# Patient Record
Sex: FEMALE | Race: WHITE | NOT HISPANIC OR LATINO | Employment: OTHER | URBAN - METROPOLITAN AREA
[De-identification: names, ages, dates, MRNs, and addresses within clinical notes are randomized per-mention and may not be internally consistent; named-entity substitution may affect disease eponyms.]

---

## 2017-03-07 ENCOUNTER — TELEPHONE (OUTPATIENT)
Dept: PAIN MEDICINE | Facility: CLINIC | Age: 47
End: 2017-03-07

## 2017-03-07 NOTE — TELEPHONE ENCOUNTER
----- Message from Uli Pratt sent at 3/7/2017  3:15 PM CST -----  X_  1st Request  _  2nd Request  _  3rd Request        Who: RUBEN MERA [[id number]]    Why: Patient needs to schedule botox injections for migraines    What Number to Call Back: [phone]

## 2017-03-07 NOTE — TELEPHONE ENCOUNTER
Contacted and scheduled a est office for patient as she hasn't been to clinic since 2014, she requires a office visit prior to us scheduling botox.

## 2017-03-14 ENCOUNTER — OFFICE VISIT (OUTPATIENT)
Dept: PAIN MEDICINE | Facility: CLINIC | Age: 47
End: 2017-03-14
Payer: COMMERCIAL

## 2017-03-14 VITALS
HEART RATE: 96 BPM | WEIGHT: 152.13 LBS | RESPIRATION RATE: 20 BRPM | TEMPERATURE: 98 F | HEIGHT: 66 IN | DIASTOLIC BLOOD PRESSURE: 87 MMHG | SYSTOLIC BLOOD PRESSURE: 144 MMHG | BODY MASS INDEX: 24.45 KG/M2

## 2017-03-14 DIAGNOSIS — M54.2 CERVICALGIA: ICD-10-CM

## 2017-03-14 DIAGNOSIS — G24.3 CERVICAL DYSTONIA: Primary | ICD-10-CM

## 2017-03-14 PROCEDURE — 1160F RVW MEDS BY RX/DR IN RCRD: CPT | Mod: S$GLB,,, | Performed by: NURSE PRACTITIONER

## 2017-03-14 PROCEDURE — 99999 PR PBB SHADOW E&M-EST. PATIENT-LVL III: CPT | Mod: PBBFAC,,, | Performed by: NURSE PRACTITIONER

## 2017-03-14 PROCEDURE — 99213 OFFICE O/P EST LOW 20 MIN: CPT | Mod: S$GLB,,, | Performed by: NURSE PRACTITIONER

## 2017-03-14 RX ORDER — PREDNISONE 5 MG/1
TABLET ORAL
COMMUNITY
Start: 2017-03-03 | End: 2017-05-05 | Stop reason: SDUPTHER

## 2017-03-14 NOTE — PROGRESS NOTES
Chronic patient Established Note (Follow up visit)      SUBJECTIVE:    Esme Louise presents to the clinic for a follow-up appointment for neck pain. She was last seen in our office in 2014. Since that time, she has had a bilateral occipital nerve decompression surgery done in [place]. This has significantly improved her head pain. She is no longer on disability. She continues to have neck pain which causes migraines. She reports 15 days out of the last month with a headache, with some episodes lasting 4 days. Her neurologist, Dr. Orozco, has recommended Botox injections. She has had them in the past with benefit, last in 2014. She describes her neck pain as pulling and tightness. The pain causes her head to feel heavy. She does use muscle relaxers and ice with some relief. She has done physical therapy for her neck in the past with some benefit. Since the last visit, Esme Louise states the pain has been improving. Current pain intensity is 1/10.    Pain Disability Index Review:  Last 3 PDI Scores 3/14/2017 3/17/2014   Pain Disability Index (PDI) 23 50       Pain Medications:    - Adjuvant Medications: Lyrica ( Pregabalin), Xanax, Zomig, Imitrex    Opioid Contract: no     report:  Reviewed and consistent with medication use as prescribed.    Pain Procedures:  2/24/2014- Bilateral C2,3 MBB  3/20/2014- Right C2,3 RFA  3/27/2014- Left C2,3 RFA    Physical Therapy/Home Exercise: yes    Imaging:   None available    Allergies:   Review of patient's allergies indicates:   Allergen Reactions    Pcn [penicillins] Swelling    Zoloft [sertraline] Hives and Swelling       Current Medications:   Current Outpatient Prescriptions   Medication Sig Dispense Refill    abatacept, with maltose, (ORENCIA) 250 mg injection Inject 500 mg into the skin.      butalbital-acetaminophen-caff -40 mg Cap TK ONE C PO Q 4 HOURS  0    clonazePAM (KLONOPIN) 0.5 MG tablet TK 1 T PO BID PRN  5    cyclobenzaprine (AMRIX) 15 MG 24 hr  capsule TAKE 1 CAPSULE BY MOUTH DAILY AT 6 IN THE EVENING compounded especially for you by the pharmacist  3    diclofenac (VOLTAREN) 50 MG EC tablet TK 1 T PO BID  0    fluoxetine (PROZAC) 20 MG tablet Take 20 mg by mouth once daily.      levothyroxine (SYNTHROID) 100 MCG tablet Take 100 mcg by mouth once daily.      LYRICA 100 mg capsule TK ONE C PO QHS  2    NUVIGIL 250 mg tablet TK 1 T PO QD  5    sumatriptan (IMITREX STATDOSE) 6 mg/0.5 mL kit INJECT 6MG UNDER SKIN ONCE. MAY REPEAT IN 1 HOUR IF NEEDED  2    tizanidine 2 mg Cap Take 2 mg by mouth as needed.      zolmitriptan (ZOMIG) 5 MG tablet TK 1 T PO  ONCE PRN HA  3    alprazolam (XANAX) 0.5 MG tablet Take 1 tablet (0.5 mg total) by mouth as needed for Insomnia or Anxiety. 5 tablet 0    esomeprazole (NEXIUM) 40 MG capsule TK 1 C PO QD  1    methotrexate 5 MG tablet Take 30 mg by mouth once a week.      misoprostol (CYTOTEC) 200 MCG Tab 2 tabs po x1 prior to procedure 2 tablet 0    predniSONE (DELTASONE) 5 MG tablet        No current facility-administered medications for this visit.        REVIEW OF SYSTEMS:    GENERAL:  No weight loss, malaise or fevers.  HEENT:  Frequent headaches.   NECK:  Negative for lumps, goiter, pain and significant neck swelling.  RESPIRATORY:  Negative for cough, wheezing or shortness of breath.  CARDIOVASCULAR:  Negative for chest pain, leg swelling or palpitations. HTN.   GI:  Negative for abdominal discomfort, blood in stools or black stools or change in bowel habits.  MUSCULOSKELETAL:  See HPI.  SKIN:  Negative for lesions, rash, and itching.  PSYCH:  Negative for sleep disturbance, mood disorder and recent psychosocial stressors.  HEMATOLOGY/LYMPHOLOGY:  Negative for prolonged bleeding, bruising easily or swollen nodes.  NEURO:   No history of headaches, syncope, paralysis, seizures or tremors.  ENDO: Thyroid disorder- taking Synthroid.   All other reviewed and negative other than HPI.    Past Medical History:  Past  "Medical History:   Diagnosis Date    Anemia     Anxiety     Arthritis     Arthritis     Breast disorder     Depression     Endometriosis     Headaches, cluster     Hypertension     Mental disorder     Occipital neuralgia     Premature ovarian failure     Thyroid disease        Past Surgical History:  Past Surgical History:   Procedure Laterality Date    BREAST LUMPECTOMY      BREAST LUMPECTOMY      ENDOMETRIAL ABLATION      OCCIPITAL NERVE STIMULATOR INSERTION      OOPHORECTOMY      ovary removed         Family History:  Family History   Problem Relation Age of Onset    Autoimmune disease Maternal Uncle     Diabetes Maternal Grandmother     Miscarriages / Stillbirths Maternal Grandmother     Colon cancer Maternal Grandfather     Breast cancer Neg Hx     Eclampsia Neg Hx     Hypertension Neg Hx     Ovarian cancer Neg Hx        Social History:  Social History     Social History    Marital status:      Spouse name: N/A    Number of children: N/A    Years of education: N/A     Social History Main Topics    Smoking status: Former Smoker    Smokeless tobacco: None    Alcohol use No    Drug use: No    Sexual activity: No     Other Topics Concern    None     Social History Narrative       OBJECTIVE:    BP (!) 144/87  Pulse 96  Temp 98.2 °F (36.8 °C) (Oral)   Resp 20  Ht 5' 6" (1.676 m)  Wt 69 kg (152 lb 1.6 oz)  BMI 24.55 kg/m2    PHYSICAL EXAMINATION:    General appearance: Well appearing, in no acute distress, alert and oriented x3.  Psych:  Mood and affect appropriate.  Skin: Skin color, texture, turgor normal, no rashes or lesions, in both upper and lower body.  Head/face:  Atraumatic, normocephalic. No palpable lymph nodes  Neck: No pain to palpation over the cervical paraspinous muscles. Pain with palpation to trapezius muscle.  Spurling Negative. Full ROM with pain on flexion and extension.   Cor: RRR  Pulm: CTA  GI: Abdomen soft and non-tender.  Back: Straight leg " raising in the sitting and supine positions is negative to radicular pain. No pain to palpation over the spine or costovertebral angles. Normal range of motion without pain reproduction.  Extremities: Peripheral joint ROM is full and pain free without obvious instability or laxity in all four extremities. No deformities, edema, or skin discoloration. Good capillary refill.  Musculoskeletal: Shoulder, hip, sacroiliac and knee provocative maneuvers are negative. Bilateral upper and lower extremity strength is normal and symmetric.  No atrophy or tone abnormalities are noted.  Neuro: Bilateral upper and lower extremity coordination and muscle stretch reflexes are physiologic and symmetric.  Plantar response are downgoing. No loss of sensation is noted.  Gait: Normal.    ASSESSMENT: 46 y.o. year old female with neck pain, consistent with the followin. Cervical dystonia  Medication Pre-Authorization   2. Cervicalgia  Medication Pre-Authorization         PLAN:     - Obtain records from Dr. Orozco, neurology, in [place].     - Medication PA initiated for Botox 300 units.     - Continue current medications.     - RTC in 2 weeks.     - Counseled patient regarding the importance of activity modification, constant sleeping habits and physical therapy.    - Dr. Akbar was consulted on the patient and agrees with this plan.    The above plan and management options were discussed at length with patient. Patient is in agreement with the above and verbalized understanding.    Shikha Vazquez NP  2017

## 2017-03-14 NOTE — MR AVS SNAPSHOT
[facility]  [address]  [place] [address]  Phone: [phone]  Fax: [phone]                  Esme Louise   3/14/2017 9:30 AM   Office Visit    Description:  Female : 1970   Provider:  Shikha Vazquez NP   Department:  [facility]           Reason for Visit     Neck Pain           Diagnoses this Visit        Comments    Cervical dystonia    -  Primary     Cervicalgia                To Do List           Future Appointments        Provider Department Dept Phone    3/29/2017 3:15 PM Mai Akbar MD [facility] [phone]      Goals (5 Years of Data)     None      [facility] On [facility]     [facility] On Call Nurse Care Line -  Assistance  Registered nurses in the [facility] On Call Center provide clinical advisement, health education, appointment booking, and other advisory services.  Call for this free service at [phone].             Medications           Message regarding Medications     Verify the changes and/or additions to your medication regime listed below are the same as discussed with your clinician today.  If any of these changes or additions are incorrect, please notify your healthcare provider.             Verify that the below list of medications is an accurate representation of the medications you are currently taking.  If none reported, the list may be blank. If incorrect, please contact your healthcare provider. Carry this list with you in case of emergency.           Current Medications     abatacept, with maltose, (ORENCIA) 250 mg injection Inject 500 mg into the skin.    butalbital-acetaminophen-caff -40 mg Cap TK ONE C PO Q 4 HOURS    clonazePAM (KLONOPIN) 0.5 MG tablet TK 1 T PO BID PRN    cyclobenzaprine (AMRIX) 15 MG 24 hr capsule TAKE 1 CAPSULE BY MOUTH DAILY AT 6 IN THE EVENING compounded especially for you by the pharmacist    diclofenac (VOLTAREN) 50 MG EC tablet TK 1 T PO BID    fluoxetine (PROZAC) 20 MG tablet  "Take 20 mg by mouth once daily.    levothyroxine (SYNTHROID) 100 MCG tablet Take 100 mcg by mouth once daily.    LYRICA 100 mg capsule TK ONE C PO QHS    NUVIGIL 250 mg tablet TK 1 T PO QD    sumatriptan (IMITREX STATDOSE) 6 mg/0.5 mL kit INJECT 6MG UNDER SKIN ONCE. MAY REPEAT IN 1 HOUR IF NEEDED    tizanidine 2 mg Cap Take 2 mg by mouth as needed.    zolmitriptan (ZOMIG) 5 MG tablet TK 1 T PO  ONCE PRN HA    alprazolam (XANAX) 0.5 MG tablet Take 1 tablet (0.5 mg total) by mouth as needed for Insomnia or Anxiety.    esomeprazole (NEXIUM) 40 MG capsule TK 1 C PO QD    methotrexate 5 MG tablet Take 30 mg by mouth once a week.    misoprostol (CYTOTEC) 200 MCG Tab 2 tabs po x1 prior to procedure    predniSONE (DELTASONE) 5 MG tablet            Clinical Reference Information           Your Vitals Were     BP Pulse Temp Resp Height Weight    144/87 96 98.2 °F (36.8 °C) (Oral) 20 5' 6" (1.676 m) 69 kg (152 lb 1.6 oz)    BMI                24.55 kg/m2          Blood Pressure          Most Recent Value    BP  (!)  144/87      Allergies as of 3/14/2017     Pcn [Penicillins]    Zoloft [Sertraline]      Immunizations Administered on Date of Encounter - 3/14/2017     None      Language Assistance Services     ATTENTION: Language assistance services are available, free of charge. Please call [phone].      ATENCIÓN: Si habla blanca, tiene a elizabeth disposición servicios gratuitos de asistencia lingüística. Llame al [phone].     [facility] Ý: N?u b?n nói Ti?ng Vi?t, có các d?ch v? h? tr? ngôn ng? mi?n phí dành cho b?n. G?i s? [phone].         [facility] complies with applicable Federal civil rights laws and does not discriminate on the basis of race, color, national origin, age, disability, or sex.        "

## 2017-03-24 ENCOUNTER — PATIENT MESSAGE (OUTPATIENT)
Dept: PAIN MEDICINE | Facility: CLINIC | Age: 47
End: 2017-03-24

## 2017-03-29 ENCOUNTER — TELEPHONE (OUTPATIENT)
Dept: PAIN MEDICINE | Facility: CLINIC | Age: 47
End: 2017-03-29

## 2017-03-29 NOTE — TELEPHONE ENCOUNTER
----- Message from Uli Pratt sent at 3/29/2017 11:44 AM CDT -----  Contact: RUBEN MERA [[id number]]  _X  1st Request  _  2nd Request  _  3rd Request        Who: RUBEN MERA [[id number]]    Why: Patient calling to check and see if Botox injections are approved by insurance. Patient has an appt today at 3:15.    What Number to Call Back: [phone]    When to Expect a call back: (Before the end of the day)   -- if the call is after 12:00, the call back will be tomorrow.

## 2017-03-29 NOTE — TELEPHONE ENCOUNTER
Patient was contacted by telephone regarding her [facility] message.     Reference number regarding botox approval is [id number]  Nurse kim -[phone] fax [phone].

## 2017-03-29 NOTE — TELEPHONE ENCOUNTER
Contacted and spoke to patient, informed her that her botox is pending nurse review and at this time has not been approved. They didn't give staff a time frame, but staff have informed patient they will contact her by 2pm to confirm approval or r/s due to no authorization as of yet.    Patient verbalized understanding and also was given staff contact information.

## 2017-04-04 ENCOUNTER — PATIENT MESSAGE (OUTPATIENT)
Dept: PAIN MEDICINE | Facility: CLINIC | Age: 47
End: 2017-04-04

## 2017-04-05 ENCOUNTER — OFFICE VISIT (OUTPATIENT)
Dept: PAIN MEDICINE | Facility: CLINIC | Age: 47
End: 2017-04-05
Attending: ANESTHESIOLOGY
Payer: COMMERCIAL

## 2017-04-05 VITALS
WEIGHT: 149.94 LBS | SYSTOLIC BLOOD PRESSURE: 141 MMHG | HEART RATE: 112 BPM | DIASTOLIC BLOOD PRESSURE: 90 MMHG | TEMPERATURE: 98 F | HEIGHT: 66 IN | BODY MASS INDEX: 24.1 KG/M2

## 2017-04-05 DIAGNOSIS — G24.3 ISOLATED CERVICAL DYSTONIA: ICD-10-CM

## 2017-04-05 DIAGNOSIS — M62.838 MUSCLE SPASM: ICD-10-CM

## 2017-04-05 DIAGNOSIS — G43.119 INTRACTABLE MIGRAINE WITH AURA WITHOUT STATUS MIGRAINOSUS: Primary | ICD-10-CM

## 2017-04-05 PROCEDURE — 64612 DESTROY NERVE FACE MUSCLE: CPT | Mod: S$GLB,,, | Performed by: ANESTHESIOLOGY

## 2017-04-05 PROCEDURE — 99499 UNLISTED E&M SERVICE: CPT | Mod: S$GLB,,, | Performed by: ANESTHESIOLOGY

## 2017-04-05 PROCEDURE — 95874 GUIDE NERV DESTR NEEDLE EMG: CPT | Mod: S$GLB,,, | Performed by: ANESTHESIOLOGY

## 2017-04-05 PROCEDURE — 99999 PR PBB SHADOW E&M-EST. PATIENT-LVL III: CPT | Mod: PBBFAC,,, | Performed by: ANESTHESIOLOGY

## 2017-04-05 PROCEDURE — 64616 CHEMODENERV MUSC NECK DYSTON: CPT | Mod: 50,59,S$GLB, | Performed by: ANESTHESIOLOGY

## 2017-04-05 NOTE — LETTER
April 5, 2017      Shikha Vazquez, NP  [address]  [address]  [facility] [address]           [facility]  [address] [facility] [facility] [address]  Phone: [phone]  Fax: [phone]          Patient: Esme Louise   MR Number: [id number]   YOB: 1970   Date of Visit: 4/5/2017       Dear Shikha Vazquez:    Thank you for referring Esme Louise to me for evaluation. Attached you will find relevant portions of my assessment and plan of care.    If you have questions, please do not hesitate to call me. I look forward to following Esme Louise along with you.    Sincerely,    Mai Akbar MD    Enclosure  CC:  No Recipients    If you would like to receive this communication electronically, please contact [email or web address] or [phone] to request more information on [employer] access.    For providers and/or their staff who would like to refer a patient to [facility], please contact us through our one-stop-shop provider referral line, [facility], at [phone].    If you feel you have received this communication in error or would no longer like to receive these types of communications, please e-mail [email or web address]

## 2017-04-05 NOTE — MR AVS SNAPSHOT
[facility]  [address]  [place] [address]  Phone: [phone]  Fax: [phone]                  Esme Louise   2017 3:15 PM   Office Visit    Description:  Female : 1970   Provider:  Mai Akbar MD   Department:  [facility]           Reason for Visit     Botulinum Toxin Injection                To Do List           Future Appointments        Provider Department Dept Phone    2017 9:00 AM FANNY Cevallos [facility] [phone]      Goals (5 Years of Data)     None      [facility]     [facility] On Call Nurse Care Line -  Assistance  Unless otherwise directed by your provider, please contact [facility], our nurse care line that is available for  assistance.     Registered nurses in the [facility] On Call Center provide: appointment scheduling, clinical advisement, health education, and other advisory services.  Call: [phone] (toll free)               Medications           Message regarding Medications     Verify the changes and/or additions to your medication regime listed below are the same as discussed with your clinician today.  If any of these changes or additions are incorrect, please notify your healthcare provider.             Verify that the below list of medications is an accurate representation of the medications you are currently taking.  If none reported, the list may be blank. If incorrect, please contact your healthcare provider. Carry this list with you in case of emergency.           Current Medications     abatacept, with maltose, (ORENCIA) 250 mg injection Inject 500 mg into the skin.    alprazolam (XANAX) 0.5 MG tablet Take 1 tablet (0.5 mg total) by mouth as needed for Insomnia or Anxiety.    butalbital-acetaminophen-caff -40 mg Cap TK ONE C PO Q 4 HOURS    clonazePAM (KLONOPIN) 0.5 MG tablet TK 1 T PO BID PRN    cyclobenzaprine (AMRIX) 15 MG 24 hr capsule TAKE 1 CAPSULE BY MOUTH  "DAILY AT 6 IN THE EVENING compounded especially for you by the pharmacist    diclofenac (VOLTAREN) 50 MG EC tablet TK 1 T PO BID    esomeprazole (NEXIUM) 40 MG capsule TK 1 C PO QD    fluoxetine (PROZAC) 20 MG tablet Take 20 mg by mouth once daily.    levothyroxine (SYNTHROID) 100 MCG tablet Take 100 mcg by mouth once daily.    LYRICA 100 mg capsule TK ONE C PO QHS    methotrexate 5 MG tablet Take 30 mg by mouth once a week.    misoprostol (CYTOTEC) 200 MCG Tab 2 tabs po x1 prior to procedure    NUVIGIL 250 mg tablet TK 1 T PO QD    predniSONE (DELTASONE) 5 MG tablet     sumatriptan (IMITREX STATDOSE) 6 mg/0.5 mL kit INJECT 6MG UNDER SKIN ONCE. MAY REPEAT IN 1 HOUR IF NEEDED    tizanidine 2 mg Cap Take 2 mg by mouth as needed.    zolmitriptan (ZOMIG) 5 MG tablet TK 1 T PO  ONCE PRN HA           Clinical Reference Information           Your Vitals Were     BP Pulse Temp Height Weight BMI    141/90 112 98.2 °F (36.8 °C) (Oral) 5' 6" (1.676 m) 68 kg (149 lb 14.6 oz) 24.2 kg/m2      Blood Pressure          Most Recent Value    BP  (!)  141/90      Allergies as of 4/5/2017     Pcn [Penicillins]    Zoloft [Sertraline]      Immunizations Administered on Date of Encounter - 4/5/2017     None      Language Assistance Services     ATTENTION: Language assistance services are available, free of charge. Please call [phone].      ATENCIÓN: Si habla blanca, tiene a elizabeth disposición servicios gratuitos de asistencia lingüística. Llame al [phone].     CHÚ Ý: N?u b?n nói Ti?ng Vi?t, có các d?ch v? h? tr? ngôn ng? mi?n phí dành cho b?n. G?i s? [phone].         [facility] complies with applicable Federal civil rights laws and does not discriminate on the basis of race, color, national origin, age, disability, or sex.        "

## 2017-04-05 NOTE — PROGRESS NOTES
Subjective:      Patient ID: Esme Louise is a 46 y.o. female.    Chief Complaint: Botulinum Toxin Injection (100 units)    Referred by: Shikha Vazquez NP     Pain Scales  Best:/10  Worst:  Usually:  Today: 6/10      Here for follow-up and for Botox injection.  She has bilateral neck pain but she also has intractable headaches that radiates from her neck.  She says that she had surgical release of the nerves in her head with significant pain one year postoperatively then it settled down her pain recurred afterwards.  She has sharp pain mostly to the right side.  Is not throbbing.  She has nausea with it and has high sensitivity to odors.  No other associated symptoms.  No new symptomatology.    Past Medical History:   Diagnosis Date    Anemia     Anxiety     Arthritis     Arthritis     Breast disorder     Depression     Endometriosis     Headaches, cluster     Hypertension     Mental disorder     Occipital neuralgia     Premature ovarian failure     Thyroid disease        Past Surgical History:   Procedure Laterality Date    BREAST LUMPECTOMY      BREAST LUMPECTOMY      ENDOMETRIAL ABLATION      OCCIPITAL NERVE STIMULATOR INSERTION      OOPHORECTOMY      ovary removed         Review of patient's allergies indicates:   Allergen Reactions    Pcn [penicillins] Swelling    Zoloft [sertraline] Hives and Swelling       Current Outpatient Prescriptions   Medication Sig Dispense Refill    abatacept, with maltose, (ORENCIA) 250 mg injection Inject 500 mg into the skin.      alprazolam (XANAX) 0.5 MG tablet Take 1 tablet (0.5 mg total) by mouth as needed for Insomnia or Anxiety. 5 tablet 0    butalbital-acetaminophen-caff -40 mg Cap TK ONE C PO Q 4 HOURS  0    clonazePAM (KLONOPIN) 0.5 MG tablet TK 1 T PO BID PRN  5    cyclobenzaprine (AMRIX) 15 MG 24 hr capsule TAKE 1 CAPSULE BY MOUTH DAILY AT 6 IN THE EVENING compounded especially for you by the pharmacist  3    diclofenac (VOLTAREN) 50 MG  EC tablet TK 1 T PO BID  0    esomeprazole (NEXIUM) 40 MG capsule TK 1 C PO QD  1    fluoxetine (PROZAC) 20 MG tablet Take 20 mg by mouth once daily.      levothyroxine (SYNTHROID) 100 MCG tablet Take 100 mcg by mouth once daily.      LYRICA 100 mg capsule TK ONE C PO QHS  2    methotrexate 5 MG tablet Take 30 mg by mouth once a week.      misoprostol (CYTOTEC) 200 MCG Tab 2 tabs po x1 prior to procedure 2 tablet 0    NUVIGIL 250 mg tablet TK 1 T PO QD  5    predniSONE (DELTASONE) 5 MG tablet       sumatriptan (IMITREX STATDOSE) 6 mg/0.5 mL kit INJECT 6MG UNDER SKIN ONCE. MAY REPEAT IN 1 HOUR IF NEEDED  2    tizanidine 2 mg Cap Take 2 mg by mouth as needed.      zolmitriptan (ZOMIG) 5 MG tablet TK 1 T PO  ONCE PRN HA  3     No current facility-administered medications for this visit.        Family History   Problem Relation Age of Onset    Autoimmune disease Maternal Uncle     Diabetes Maternal Grandmother     Miscarriages / Stillbirths Maternal Grandmother     Colon cancer Maternal Grandfather     Breast cancer Neg Hx     Eclampsia Neg Hx     Hypertension Neg Hx     Ovarian cancer Neg Hx        Social History     Social History    Marital status:      Spouse name: N/A    Number of children: N/A    Years of education: N/A     Occupational History    Not on file.     Social History Main Topics    Smoking status: Former Smoker    Smokeless tobacco: Not on file    Alcohol use No    Drug use: No    Sexual activity: No     Other Topics Concern    Not on file     Social History Narrative       Review of Systems   Constitution: Negative for chills, fever, malaise/fatigue, weight gain and weight loss.   HENT: Positive for headaches. Negative for ear pain and hoarse voice.    Eyes: Negative for blurred vision, pain and visual disturbance.   Cardiovascular: Negative for chest pain, dyspnea on exertion and irregular heartbeat.   Respiratory: Negative for cough, shortness of breath and  "wheezing.    Endocrine: Negative for cold intolerance and heat intolerance.   Hematologic/Lymphatic: Negative for adenopathy and bleeding problem. Does not bruise/bleed easily.   Skin: Negative for color change, itching and rash.   Musculoskeletal: Positive for neck pain. Negative for back pain.   Gastrointestinal: Negative for change in bowel habit, diarrhea, hematemesis, hematochezia, melena and vomiting.   Genitourinary: Negative for flank pain, frequency, hematuria and urgency.   Neurological: Negative for difficulty with concentration, dizziness, loss of balance and seizures.   Psychiatric/Behavioral: Negative for altered mental status, depression and suicidal ideas. The patient is not nervous/anxious.    Allergic/Immunologic: Negative for HIV exposure.           Objective:      BP (!) 141/90  Pulse (!) 112  Temp 98.2 °F (36.8 °C) (Oral)   Ht 5' 6" (1.676 m)  Wt 68 kg (149 lb 14.6 oz)  BMI 24.2 kg/m2  Normocephalic.  Atraumatic.  Affect appropriate.  Breathing unlabored.  Extra ocular muscles intact.      Ortho/SPM Exam    bilateral muscle spasm in the neck with prominent muscles.  Her shoulders are drawn upwards.  Significant tenderness.  No signs of infection.  Assessment:       Encounter Diagnoses   Name Primary?    Intractable migraine with aura without status migrainosus Yes    Isolated cervical dystonia     Muscle spasm          Plan:       Esme was seen today for botulinum toxin injection.    Diagnoses and all orders for this visit:    Intractable migraine with aura without status migrainosus  -     onabotulinumtoxina injection 200 Units; Inject 200 Units into the muscle one time.    Isolated cervical dystonia  -     onabotulinumtoxina injection 200 Units; Inject 200 Units into the muscle one time.    Muscle spasm  -     onabotulinumtoxina injection 200 Units; Inject 200 Units into the muscle one time.     We discussed with the patient the assessment and recommendations. The following is the " plan we agreed on:  1.  Procedure: Under clean technique, and after discussion with the patient we used 200 units of Botox.  We injected the procerus in the midline.  We injected bilateral , frontalis, temporalis, splenius capitis, upper cervical paraspinals, upper trapezius and levator scapulae.  The patient tolerated procedure well.  2.  Blood pressure as per primary care physician.  3.  Return as needed.  Otherwise follow-up in one month to see how it worked.

## 2017-05-05 ENCOUNTER — OFFICE VISIT (OUTPATIENT)
Dept: PAIN MEDICINE | Facility: CLINIC | Age: 47
End: 2017-05-05
Payer: COMMERCIAL

## 2017-05-05 VITALS
SYSTOLIC BLOOD PRESSURE: 143 MMHG | WEIGHT: 155 LBS | DIASTOLIC BLOOD PRESSURE: 97 MMHG | BODY MASS INDEX: 24.91 KG/M2 | TEMPERATURE: 99 F | HEIGHT: 66 IN | HEART RATE: 91 BPM

## 2017-05-05 DIAGNOSIS — M25.511 BILATERAL SHOULDER PAIN, UNSPECIFIED CHRONICITY: ICD-10-CM

## 2017-05-05 DIAGNOSIS — G43.119 INTRACTABLE MIGRAINE WITH AURA WITHOUT STATUS MIGRAINOSUS: ICD-10-CM

## 2017-05-05 DIAGNOSIS — M25.512 BILATERAL SHOULDER PAIN, UNSPECIFIED CHRONICITY: ICD-10-CM

## 2017-05-05 DIAGNOSIS — G24.3 CERVICAL DYSTONIA: ICD-10-CM

## 2017-05-05 DIAGNOSIS — M62.838 MUSCLE SPASM: ICD-10-CM

## 2017-05-05 DIAGNOSIS — M54.2 CERVICALGIA: Primary | ICD-10-CM

## 2017-05-05 DIAGNOSIS — G24.3 ISOLATED CERVICAL DYSTONIA: ICD-10-CM

## 2017-05-05 PROCEDURE — 1160F RVW MEDS BY RX/DR IN RCRD: CPT | Mod: S$GLB,,, | Performed by: NURSE PRACTITIONER

## 2017-05-05 PROCEDURE — 99999 PR PBB SHADOW E&M-EST. PATIENT-LVL III: CPT | Mod: PBBFAC,,, | Performed by: NURSE PRACTITIONER

## 2017-05-05 PROCEDURE — 99213 OFFICE O/P EST LOW 20 MIN: CPT | Mod: S$GLB,,, | Performed by: NURSE PRACTITIONER

## 2017-05-05 NOTE — MR AVS SNAPSHOT
[facility]  [address]  [place] [address]  Phone: [phone]  Fax: [phone]                  Esme Louise   2017 9:00 AM   Office Visit    Description:  Female : 1970   Provider:  FANNY Cevallos   Department:  [facility]           Diagnoses this Visit        Comments    Cervicalgia    -  Primary     Muscle spasm         Cervical dystonia         Intractable migraine with aura without status migrainosus         Isolated cervical dystonia         Bilateral shoulder pain, unspecified chronicity                To Do List           Future Appointments        Provider Department Dept Phone    2017 9:15 AM Mai Akbar MD [facility] [phone]      Goals (5 Years of Data)     None      [facility]     [facility] -  Assistance  Unless otherwise directed by your provider, please contact [facility], our nurse care line that is available for  assistance.     Registered nurses in the [facility] Center provide: appointment scheduling, clinical advisement, health education, and other advisory services.  Call: [phone] (toll free)               Medications           Message regarding Medications     Verify the changes and/or additions to your medication regime listed below are the same as discussed with your clinician today.  If any of these changes or additions are incorrect, please notify your healthcare provider.        STOP taking these medications     predniSONE (DELTASONE) 5 MG tablet            Verify that the below list of medications is an accurate representation of the medications you are currently taking.  If none reported, the list may be blank. If incorrect, please contact your healthcare provider. Carry this list with you in case of emergency.           Current Medications     abatacept, with maltose, (ORENCIA) 250 mg injection Inject 500 mg into the skin.    alprazolam  "(XANAX) 0.5 MG tablet Take 1 tablet (0.5 mg total) by mouth as needed for Insomnia or Anxiety.    butalbital-acetaminophen-caff -40 mg Cap TK ONE C PO Q 4 HOURS    clonazePAM (KLONOPIN) 0.5 MG tablet TK 1 T PO BID PRN    cyclobenzaprine (AMRIX) 15 MG 24 hr capsule TAKE 1 CAPSULE BY MOUTH DAILY AT 6 IN THE EVENING compounded especially for you by the pharmacist    diclofenac (VOLTAREN) 50 MG EC tablet TK 1 T PO BID    esomeprazole (NEXIUM) 40 MG capsule TK 1 C PO QD    fluoxetine (PROZAC) 20 MG tablet Take 20 mg by mouth once daily.    levothyroxine (SYNTHROID) 100 MCG tablet Take 100 mcg by mouth once daily.    LYRICA 100 mg capsule TK ONE C PO QHS    methotrexate 5 MG tablet Take 30 mg by mouth once a week.    misoprostol (CYTOTEC) 200 MCG Tab 2 tabs po x1 prior to procedure    NUVIGIL 250 mg tablet TK 1 T PO QD    sumatriptan (IMITREX STATDOSE) 6 mg/0.5 mL kit INJECT 6MG UNDER SKIN ONCE. MAY REPEAT IN 1 HOUR IF NEEDED    tizanidine 2 mg Cap Take 2 mg by mouth as needed.    zolmitriptan (ZOMIG) 5 MG tablet TK 1 T PO  ONCE PRN HA           Clinical Reference Information           Your Vitals Were     BP Pulse Temp Height Weight BMI    143/97 91 98.7 °F (37.1 °C) 5' 6" (1.676 m) 70.3 kg (154 lb 15.7 oz) 25.01 kg/m2      Blood Pressure          Most Recent Value    BP  (!)  143/97      Allergies as of 5/5/2017     Pcn [Penicillins]    Zoloft [Sertraline]      Immunizations Administered on Date of Encounter - 5/5/2017     None      Orders Placed During Today's Visit      Normal Orders This Visit    Ambulatory consult to [facility]       Language Assistance Services     ATTENTION: Language assistance services are available, free of charge. Please call [phone].      ATENCIÓN: Si habla blanca, tiene a elizabeth disposición servicios gratuitos de asistencia lingüística. Llame al [phone].     CHÚ Ý: N?u b?n nói Ti?ng Vi?t, có các d?ch v? h? tr? ngôn ng? mi?n phí lizetteh cho b?n. G?i s? [phone].   "       [facility] complies with applicable Federal civil rights laws and does not discriminate on the basis of race, color, national origin, age, disability, or sex.

## 2017-05-05 NOTE — PROGRESS NOTES
Chronic patient Established Note (Follow up visit)      SUBJECTIVE:    Esme Louise presents to the clinic for a follow-up appointment for neck pain.  She is s/p 200 units of Botox with about 50% relief of head and neck pain.  She now notices pain a little lower down, mainly between her shoulder blades.  Her pain is worse with activity and turning her head.  If she is active for a large duration of time, she feels increased pain and muscle soreness.  She reports that she cleaned her house yesterday for a few hours which caused increased muscle pain.  She has done physical therapy for her neck in the past with some benefit.  Since the last visit, Esme Louise states the pain has been improving. Current pain intensity is 2/10.    Pain Disability Index Review:  Last 3 PDI Scores 5/5/2017 4/5/2017 3/14/2017   Pain Disability Index (PDI) 7 [phone] 23       Pain Medications:    - Adjuvant Medications: Lyrica ( Pregabalin), Xanax, Zomig, Imitrex    Opioid Contract: no     report:  Reviewed and consistent with medication use as prescribed.    Pain Procedures:  2/24/2014- Bilateral C2,3 MBB  3/20/2014- Right C2,3 RFA  3/27/2014- Left C2,3 RFA  4/5/17 200 Units Botox    Physical Therapy/Home Exercise: yes    Imaging:   None available    Allergies:   Review of patient's allergies indicates:   Allergen Reactions    Pcn [penicillins] Swelling    Zoloft [sertraline] Hives and Swelling       Current Medications:   Current Outpatient Prescriptions   Medication Sig Dispense Refill    abatacept, with maltose, (ORENCIA) 250 mg injection Inject 500 mg into the skin.      alprazolam (XANAX) 0.5 MG tablet Take 1 tablet (0.5 mg total) by mouth as needed for Insomnia or Anxiety. 5 tablet 0    butalbital-acetaminophen-caff -40 mg Cap TK ONE C PO Q 4 HOURS  0    clonazePAM (KLONOPIN) 0.5 MG tablet TK 1 T PO BID PRN  5    cyclobenzaprine (AMRIX) 15 MG 24 hr capsule TAKE 1 CAPSULE BY MOUTH DAILY AT 6 IN THE EVENING compounded  especially for you by the pharmacist  3    diclofenac (VOLTAREN) 50 MG EC tablet TK 1 T PO BID  0    esomeprazole (NEXIUM) 40 MG capsule TK 1 C PO QD  1    fluoxetine (PROZAC) 20 MG tablet Take 20 mg by mouth once daily.      levothyroxine (SYNTHROID) 100 MCG tablet Take 100 mcg by mouth once daily.      LYRICA 100 mg capsule TK ONE C PO QHS  2    methotrexate 5 MG tablet Take 30 mg by mouth once a week.      misoprostol (CYTOTEC) 200 MCG Tab 2 tabs po x1 prior to procedure 2 tablet 0    NUVIGIL 250 mg tablet TK 1 T PO QD  5    sumatriptan (IMITREX STATDOSE) 6 mg/0.5 mL kit INJECT 6MG UNDER SKIN ONCE. MAY REPEAT IN 1 HOUR IF NEEDED  2    tizanidine 2 mg Cap Take 2 mg by mouth as needed.      zolmitriptan (ZOMIG) 5 MG tablet TK 1 T PO  ONCE PRN HA  3     No current facility-administered medications for this visit.        REVIEW OF SYSTEMS:    GENERAL:  No weight loss, malaise or fevers.  HEENT:  Frequent headaches.   NECK:  Negative for lumps, goiter, pain and significant neck swelling.  RESPIRATORY:  Negative for cough, wheezing or shortness of breath.  CARDIOVASCULAR:  Negative for chest pain, leg swelling or palpitations. HTN.   GI:  Negative for abdominal discomfort, blood in stools or black stools or change in bowel habits.  MUSCULOSKELETAL:  See HPI.  SKIN:  Negative for lesions, rash, and itching.  PSYCH:  Negative for sleep disturbance, mood disorder and recent psychosocial stressors.  HEMATOLOGY/LYMPHOLOGY:  Negative for prolonged bleeding, bruising easily or swollen nodes.  NEURO:   No history of headaches, syncope, paralysis, seizures or tremors.  ENDO: Thyroid disorder- taking Synthroid.   All other reviewed and negative other than HPI.    Past Medical History:  Past Medical History:   Diagnosis Date    Anemia     Anxiety     Arthritis     Arthritis     Breast disorder     Depression     Endometriosis     Headaches, cluster     Hypertension     Mental disorder     Occipital neuralgia  "    Premature ovarian failure     Thyroid disease        Past Surgical History:  Past Surgical History:   Procedure Laterality Date    BREAST LUMPECTOMY      BREAST LUMPECTOMY      ENDOMETRIAL ABLATION      OCCIPITAL NERVE STIMULATOR INSERTION      OOPHORECTOMY      ovary removed         Family History:  Family History   Problem Relation Age of Onset    Autoimmune disease Maternal Uncle     Diabetes Maternal Grandmother     Miscarriages / Stillbirths Maternal Grandmother     Colon cancer Maternal Grandfather     Breast cancer Neg Hx     Eclampsia Neg Hx     Hypertension Neg Hx     Ovarian cancer Neg Hx        Social History:  Social History     Social History    Marital status:      Spouse name: N/A    Number of children: N/A    Years of education: N/A     Social History Main Topics    Smoking status: Former Smoker    Smokeless tobacco: None    Alcohol use No    Drug use: No    Sexual activity: No     Other Topics Concern    None     Social History Narrative       OBJECTIVE:    BP (!) 143/97  Pulse 91  Temp 98.7 °F (37.1 °C)  Ht 5' 6" (1.676 m)  Wt 70.3 kg (154 lb 15.7 oz)  BMI 25.01 kg/m2    PHYSICAL EXAMINATION:    General appearance: Well appearing, in no acute distress, alert and oriented x3.  Psych:  Mood and affect appropriate.  Skin: Skin color, texture, turgor normal, no rashes or lesions, in both upper and lower body.  Head/face:  Atraumatic, normocephalic. No palpable lymph nodes  Neck: No pain to palpation over the cervical paraspinous muscles.  There is pain with palpation to bilateral rhomboid muscles.  Pain with palpation to right trapezius muscle.  Spurling Negative. Full ROM with pain on flexion and extension.    Cor: RRR  Pulm: CTA  GI: Abdomen soft and non-tender.  Back: Straight leg raising in the sitting and supine positions is negative to radicular pain. No pain to palpation over the spine or costovertebral angles. Normal range of motion without pain " reproduction.  Extremities: Peripheral joint ROM is full and pain free without obvious instability or laxity in all four extremities. No deformities, edema, or skin discoloration. Good capillary refill.  Musculoskeletal: Shoulder, hip, sacroiliac and knee provocative maneuvers are negative. Bilateral upper and lower extremity strength is normal and symmetric.  No atrophy or tone abnormalities are noted.  Neuro: Bilateral upper and lower extremity coordination and muscle stretch reflexes are physiologic and symmetric.  Plantar response are downgoing. No loss of sensation is noted.  Gait: Normal.    ASSESSMENT: 47 y.o. year old female with neck and head pain, consistent with the followin. Cervicalgia  Ambulatory consult to [facility]   2. Muscle spasm  Ambulatory consult to [facility]   3. Cervical dystonia  Ambulatory consult to [facility]   4. Intractable migraine with aura without status migrainosus  Ambulatory consult to [facility]   5. Isolated cervical dystonia  Ambulatory consult to [facility]   6. Bilateral shoulder pain, unspecified chronicity  Ambulatory consult to [facility]         PLAN:     - Will again request records from Dr. Orozco, neurologidt, in [place].     - Will schedule repeat Botox for after 17.    - Continue current medications.     - Will start patient in [facility].    - RTC for Botox.    - Counseled patient regarding the importance of activity modification, constant sleeping habits and physical therapy.    - Dr. Akbar was consulted on the patient and agrees with this plan.\      The above plan and management options were discussed at length with patient. Patient is in agreement with the above and verbalized understanding.    Yue Perez, FANNY  2017

## 2017-05-05 NOTE — PROGRESS NOTES
Subjective:      Patient ID: Esme Louise is a 47 y.o. female.    Chief Complaint: No chief complaint on file.    Referred by: No ref. provider found     Pain Scales  Best:/10  Worst:  Usually:  Today: 6/10      Here for follow-up and for Botox injection.  She has bilateral neck pain but she also has intractable headaches that radiates from her neck.  She says that she had surgical release of the nerves in her head with significant pain one year postoperatively then it settled down her pain recurred afterwards.  She has sharp pain mostly to the right side.  Is not throbbing.  She has nausea with it and has high sensitivity to odors.  No other associated symptoms.  No new symptomatology.    Past Medical History:   Diagnosis Date    Anemia     Anxiety     Arthritis     Arthritis     Breast disorder     Depression     Endometriosis     Headaches, cluster     Hypertension     Mental disorder     Occipital neuralgia     Premature ovarian failure     Thyroid disease        Past Surgical History:   Procedure Laterality Date    BREAST LUMPECTOMY      BREAST LUMPECTOMY      ENDOMETRIAL ABLATION      OCCIPITAL NERVE STIMULATOR INSERTION      OOPHORECTOMY      ovary removed         Review of patient's allergies indicates:   Allergen Reactions    Pcn [penicillins] Swelling    Zoloft [sertraline] Hives and Swelling       Current Outpatient Prescriptions   Medication Sig Dispense Refill    abatacept, with maltose, (ORENCIA) 250 mg injection Inject 500 mg into the skin.      alprazolam (XANAX) 0.5 MG tablet Take 1 tablet (0.5 mg total) by mouth as needed for Insomnia or Anxiety. 5 tablet 0    butalbital-acetaminophen-caff -40 mg Cap TK ONE C PO Q 4 HOURS  0    clonazePAM (KLONOPIN) 0.5 MG tablet TK 1 T PO BID PRN  5    cyclobenzaprine (AMRIX) 15 MG 24 hr capsule TAKE 1 CAPSULE BY MOUTH DAILY AT 6 IN THE EVENING compounded especially for you by the pharmacist  3    diclofenac (VOLTAREN) 50 MG EC  tablet TK 1 T PO BID  0    esomeprazole (NEXIUM) 40 MG capsule TK 1 C PO QD  1    fluoxetine (PROZAC) 20 MG tablet Take 20 mg by mouth once daily.      levothyroxine (SYNTHROID) 100 MCG tablet Take 100 mcg by mouth once daily.      LYRICA 100 mg capsule TK ONE C PO QHS  2    methotrexate 5 MG tablet Take 30 mg by mouth once a week.      misoprostol (CYTOTEC) 200 MCG Tab 2 tabs po x1 prior to procedure 2 tablet 0    NUVIGIL 250 mg tablet TK 1 T PO QD  5    sumatriptan (IMITREX STATDOSE) 6 mg/0.5 mL kit INJECT 6MG UNDER SKIN ONCE. MAY REPEAT IN 1 HOUR IF NEEDED  2    tizanidine 2 mg Cap Take 2 mg by mouth as needed.      zolmitriptan (ZOMIG) 5 MG tablet TK 1 T PO  ONCE PRN HA  3     No current facility-administered medications for this visit.        Family History   Problem Relation Age of Onset    Autoimmune disease Maternal Uncle     Diabetes Maternal Grandmother     Miscarriages / Stillbirths Maternal Grandmother     Colon cancer Maternal Grandfather     Breast cancer Neg Hx     Eclampsia Neg Hx     Hypertension Neg Hx     Ovarian cancer Neg Hx        Social History     Social History    Marital status:      Spouse name: N/A    Number of children: N/A    Years of education: N/A     Occupational History    Not on file.     Social History Main Topics    Smoking status: Former Smoker    Smokeless tobacco: Not on file    Alcohol use No    Drug use: No    Sexual activity: No     Other Topics Concern    Not on file     Social History Narrative       Review of Systems   Constitution: Negative for chills, fever, malaise/fatigue, weight gain and weight loss.   HENT: Positive for headaches. Negative for ear pain and hoarse voice.    Eyes: Negative for blurred vision, pain and visual disturbance.   Cardiovascular: Negative for chest pain, dyspnea on exertion and irregular heartbeat.   Respiratory: Negative for cough, shortness of breath and wheezing.    Endocrine: Negative for cold  intolerance and heat intolerance.   Hematologic/Lymphatic: Negative for adenopathy and bleeding problem. Does not bruise/bleed easily.   Skin: Negative for color change, itching and rash.   Musculoskeletal: Positive for neck pain. Negative for back pain.   Gastrointestinal: Negative for change in bowel habit, diarrhea, hematemesis, hematochezia, melena and vomiting.   Genitourinary: Negative for flank pain, frequency, hematuria and urgency.   Neurological: Negative for difficulty with concentration, dizziness, loss of balance and seizures.   Psychiatric/Behavioral: Negative for altered mental status, depression and suicidal ideas. The patient is not nervous/anxious.    Allergic/Immunologic: Negative for HIV exposure.           Objective:      There were no vitals taken for this visit.  Normocephalic.  Atraumatic.  Affect appropriate.  Breathing unlabored.  Extra ocular muscles intact.      Ortho/SPM Exam    bilateral muscle spasm in the neck with prominent muscles.  Her shoulders are drawn upwards.  Significant tenderness.  No signs of infection.  Assessment:       No diagnosis found.      Plan:       There are no diagnoses linked to this encounter.   We discussed with the patient the assessment and recommendations. The following is the plan we agreed on:  1.  Procedure: Under clean technique, and after discussion with the patient we used 200 units of Botox.  We injected the procerus in the midline.  We injected bilateral , frontalis, temporalis, splenius capitis, upper cervical paraspinals, upper trapezius and levator scapulae.  The patient tolerated procedure well.  2.  Blood pressure as per primary care physician.  3.  Return as needed.  Otherwise follow-up in one month to see how it worked.

## 2017-07-11 ENCOUNTER — OFFICE VISIT (OUTPATIENT)
Dept: PAIN MEDICINE | Facility: CLINIC | Age: 47
End: 2017-07-11
Attending: ANESTHESIOLOGY
Payer: COMMERCIAL

## 2017-07-11 VITALS
WEIGHT: 154.31 LBS | RESPIRATION RATE: 18 BRPM | BODY MASS INDEX: 24.8 KG/M2 | HEART RATE: 104 BPM | SYSTOLIC BLOOD PRESSURE: 140 MMHG | HEIGHT: 66 IN | TEMPERATURE: 98 F | DIASTOLIC BLOOD PRESSURE: 90 MMHG

## 2017-07-11 DIAGNOSIS — G43.119 INTRACTABLE MIGRAINE WITH AURA WITHOUT STATUS MIGRAINOSUS: Primary | ICD-10-CM

## 2017-07-11 DIAGNOSIS — M62.838 MUSCLE SPASM: ICD-10-CM

## 2017-07-11 DIAGNOSIS — G24.3 ISOLATED CERVICAL DYSTONIA: ICD-10-CM

## 2017-07-11 PROCEDURE — 64616 CHEMODENERV MUSC NECK DYSTON: CPT | Mod: 50,51,S$GLB, | Performed by: ANESTHESIOLOGY

## 2017-07-11 PROCEDURE — 99499 UNLISTED E&M SERVICE: CPT | Mod: S$GLB,,, | Performed by: ANESTHESIOLOGY

## 2017-07-11 PROCEDURE — 99999 PR PBB SHADOW E&M-EST. PATIENT-LVL III: CPT | Mod: PBBFAC,,, | Performed by: ANESTHESIOLOGY

## 2017-07-11 PROCEDURE — 64612 DESTROY NERVE FACE MUSCLE: CPT | Mod: 50,S$GLB,, | Performed by: ANESTHESIOLOGY

## 2017-07-11 RX ORDER — LOSARTAN POTASSIUM 50 MG/1
TABLET ORAL
COMMUNITY
Start: 2017-06-28 | End: 2020-03-06 | Stop reason: SDUPTHER

## 2017-07-11 RX ORDER — ERGOCALCIFEROL 1.25 MG/1
CAPSULE ORAL
COMMUNITY
Start: 2017-07-05 | End: 2018-06-13

## 2017-07-11 NOTE — LETTER
July 11, 2017      Shikha Vazquez, NP  [address]  [address]  [facility] [address]           [facility]  [address]  [facility] [address]  Phone: [phone]  Fax: [phone]          Patient: Esme Louise   MR Number: [id number]   YOB: 1970   Date of Visit: 7/11/2017       Dear Shikha Vazquez:    Thank you for referring Esme Louise to me for evaluation. Attached you will find relevant portions of my assessment and plan of care.    If you have questions, please do not hesitate to call me. I look forward to following Esme Louise along with you.    Sincerely,    Mai Akbar MD    Enclosure  CC:  No Recipients    If you would like to receive this communication electronically, please contact [email or web address] or [phone] to request more information on [employer] Link access.    For providers and/or their staff who would like to refer a patient to [facility], please contact us through our one-stop-shop provider referral line, [facility], at [phone].    If you feel you have received this communication in error or would no longer like to receive these types of communications, please e-mail [email or web address]

## 2017-07-11 NOTE — PROGRESS NOTES
Subjective:      Patient ID: Esme Louise is a 47 y.o. female.    Chief Complaint: Neck Pain    Referred by: Shikha Vazquez NP     Pain Scales  Best: 0/10  Worst: 10/10  Usually: 5/10  Today: 0/10    Neck Pain    This is a chronic problem. The current episode started more than 1 year ago. The pain is at a severity of 0/10. The patient is experiencing no pain. The symptoms are aggravated by bending. Associated symptoms include headaches.         Here for follow-up.  Botox injection worked tremendously well for her headaches and for her neck spasms.  The first month she experienced weakness with her neck when she flexed forward only.  Otherwise she did not feel the weakness.  No other untoward side effects.  Afterwards the relief was tremendous.  She received 200 units last time.  The pain is starting back and she would like to repeat the Botox injection.    Past Medical History:   Diagnosis Date    Anemia     Anxiety     Arthritis     Arthritis     Breast disorder     Depression     Endometriosis     Headaches, cluster     Hypertension     Mental disorder     Occipital neuralgia     Premature ovarian failure     Thyroid disease        Past Surgical History:   Procedure Laterality Date    BREAST LUMPECTOMY      BREAST LUMPECTOMY      ENDOMETRIAL ABLATION      OCCIPITAL NERVE STIMULATOR INSERTION      OOPHORECTOMY      ovary removed         Review of patient's allergies indicates:   Allergen Reactions    Pcn [penicillins] Swelling    Zoloft [sertraline] Hives and Swelling       Current Outpatient Prescriptions   Medication Sig Dispense Refill    abatacept, with maltose, (ORENCIA) 250 mg injection Inject 500 mg into the skin.      alprazolam (XANAX) 0.5 MG tablet Take 1 tablet (0.5 mg total) by mouth as needed for Insomnia or Anxiety. 5 tablet 0    butalbital-acetaminophen-caff -40 mg Cap TK ONE C PO Q 4 HOURS  0    clonazePAM (KLONOPIN) 0.5 MG tablet TK 1 T PO BID PRN  5     cyclobenzaprine (AMRIX) 15 MG 24 hr capsule TAKE 1 CAPSULE BY MOUTH DAILY AT 6 IN THE EVENING compounded especially for you by the pharmacist  3    diclofenac (VOLTAREN) 50 MG EC tablet TK 1 T PO BID  0    esomeprazole (NEXIUM) 40 MG capsule TK 1 C PO QD  1    fluoxetine (PROZAC) 20 MG tablet Take 20 mg by mouth once daily.      levothyroxine (SYNTHROID) 100 MCG tablet Take 100 mcg by mouth once daily.      losartan (COZAAR) 50 MG tablet       LYRICA 100 mg capsule TK ONE C PO QHS  2    methotrexate 5 MG tablet Take 30 mg by mouth once a week.      misoprostol (CYTOTEC) 200 MCG Tab 2 tabs po x1 prior to procedure 2 tablet 0    NUVIGIL 250 mg tablet TK 1 T PO QD  5    sumatriptan (IMITREX STATDOSE) 6 mg/0.5 mL kit INJECT 6MG UNDER SKIN ONCE. MAY REPEAT IN 1 HOUR IF NEEDED  2    tizanidine 2 mg Cap Take 2 mg by mouth as needed.      VITAMIN D2 50,000 unit capsule       zolmitriptan (ZOMIG) 5 MG tablet TK 1 T PO  ONCE PRN HA  3     No current facility-administered medications for this visit.        Family History   Problem Relation Age of Onset    Autoimmune disease Maternal Uncle     Diabetes Maternal Grandmother     Miscarriages / Stillbirths Maternal Grandmother     Colon cancer Maternal Grandfather     Breast cancer Neg Hx     Eclampsia Neg Hx     Hypertension Neg Hx     Ovarian cancer Neg Hx        Social History     Social History    Marital status:      Spouse name: N/A    Number of children: N/A    Years of education: N/A     Occupational History    Not on file.     Social History Main Topics    Smoking status: Former Smoker    Smokeless tobacco: Not on file    Alcohol use No    Drug use: No    Sexual activity: No     Other Topics Concern    Not on file     Social History Narrative    No narrative on file       Review of Systems   HENT: Positive for headaches.    Musculoskeletal: Positive for neck pain.           Objective:      BP (!) 140/90 Comment: Took B/P medication  "at 9am  Pulse 104   Temp 98.4 °F (36.9 °C) (Oral)   Resp 18   Ht 5' 6" (1.676 m)   Wt 70 kg (154 lb 4.8 oz)   BMI 24.90 kg/m²   Normocephalic.  Atraumatic.  Affect appropriate.  Breathing unlabored.  Extra ocular muscles intact.      Ortho/SPM Exam    positive muscle spasm bilateral neck.  Assessment:       Encounter Diagnoses   Name Primary?    Intractable migraine with aura without status migrainosus Yes    Isolated cervical dystonia     Muscle spasm          Plan:       Esme was seen today for neck pain.    Diagnoses and all orders for this visit:    Intractable migraine with aura without status migrainosus  -     onabotulinumtoxina injection 200 Units; Inject 200 Units into the muscle one time.    Isolated cervical dystonia  -     onabotulinumtoxina injection 200 Units; Inject 200 Units into the muscle one time.    Muscle spasm  -     onabotulinumtoxina injection 200 Units; Inject 200 Units into the muscle one time.         We discussed with the patient the assessment and recommendations. The following is the plan we agreed on:  1.  We discussed reducing the dose to 150 units of Botox and reducing the volume to 3 mL total.  The patient agreed.  2.  Procedure: Under clean technique, and after discussing the patient 200 units of Botox were mixed.  We used 150 units only.  50 units were unavoidable waste.  The total volume was 3 mL using sterile, preservative free's normal saline.  We injected the procerus in the midline.  We injected bilateral , frontalis, temporalis, splenius capitis, cervical paraspinals, longissimus and upper trapezius.  The patient tolerated procedure well.  2.  Return as needed.  I offered her one month follow-up.  She said that she will call and let us know if she needs a one-month follow-up and otherwise she will return in 3 months for repeat Botox injection.      "

## 2017-10-10 ENCOUNTER — TELEPHONE (OUTPATIENT)
Dept: PAIN MEDICINE | Facility: CLINIC | Age: 47
End: 2017-10-10

## 2017-10-10 NOTE — TELEPHONE ENCOUNTER
"----- Message from Sahara Alvarado sent at 10/10/2017  7:06 AM CDT -----  Contact: Patient herself  X 1st Request  _  2nd Request  _  3rd Request    Who: Esme Louise (mrn# [id number])    Why:  Patient called to cancel today's appointment. Says, "she feels really bad."  Please give a call back at your earliest convenience to reschedule.        THANKS!    What Number to Call Back:  [phone]    When to Expect a call back: (Before the end of the day)   -- if the call is after 12:00, the call back will be tomorrow.                          "

## 2017-10-18 ENCOUNTER — TELEPHONE (OUTPATIENT)
Dept: PAIN MEDICINE | Facility: CLINIC | Age: 47
End: 2017-10-18

## 2017-10-18 NOTE — TELEPHONE ENCOUNTER
----- Message from Regina Barrios sent at 10/18/2017 10:12 AM CDT -----  _x  1st Request  _  2nd Request  _  3rd Request        Who: lenin    Why: pt. Would like to rekha. For botox injection. Please call to discuss    What Number to Call Back:[phone]    When to Expect a call back: (Before the end of the day)   -- if the call is after 12:00, the call back will be tomorrow.

## 2017-10-31 ENCOUNTER — OFFICE VISIT (OUTPATIENT)
Dept: PAIN MEDICINE | Facility: CLINIC | Age: 47
End: 2017-10-31
Attending: ANESTHESIOLOGY
Payer: COMMERCIAL

## 2017-10-31 VITALS
HEART RATE: 84 BPM | RESPIRATION RATE: 16 BRPM | DIASTOLIC BLOOD PRESSURE: 86 MMHG | BODY MASS INDEX: 25.15 KG/M2 | HEIGHT: 66 IN | WEIGHT: 156.5 LBS | SYSTOLIC BLOOD PRESSURE: 132 MMHG | TEMPERATURE: 98 F

## 2017-10-31 DIAGNOSIS — M62.838 MUSCLE SPASM: ICD-10-CM

## 2017-10-31 DIAGNOSIS — G43.119 INTRACTABLE MIGRAINE WITH AURA WITHOUT STATUS MIGRAINOSUS: Primary | ICD-10-CM

## 2017-10-31 DIAGNOSIS — G24.3 ISOLATED CERVICAL DYSTONIA: ICD-10-CM

## 2017-10-31 PROCEDURE — 64612 DESTROY NERVE FACE MUSCLE: CPT | Mod: S$GLB,,, | Performed by: ANESTHESIOLOGY

## 2017-10-31 PROCEDURE — 95874 GUIDE NERV DESTR NEEDLE EMG: CPT | Mod: S$GLB,,, | Performed by: ANESTHESIOLOGY

## 2017-10-31 PROCEDURE — 99999 PR PBB SHADOW E&M-EST. PATIENT-LVL III: CPT | Mod: PBBFAC,,, | Performed by: ANESTHESIOLOGY

## 2017-10-31 PROCEDURE — 64616 CHEMODENERV MUSC NECK DYSTON: CPT | Mod: 50,S$GLB,, | Performed by: ANESTHESIOLOGY

## 2017-10-31 PROCEDURE — 99499 UNLISTED E&M SERVICE: CPT | Mod: S$GLB,,, | Performed by: ANESTHESIOLOGY

## 2017-10-31 NOTE — LETTER
October 31, 2017      Shikha Vazquez, NP  [address]  [address]  [facility] [address]           [facility]  [address]  [facility] [address]  Phone: [phone]  Fax: [phone]          Patient: Esme Louise   MR Number: [id number]   YOB: 1970   Date of Visit: 10/31/2017       Dear Shikha Vazquez:    Thank you for referring Esme Louise to me for evaluation. Attached you will find relevant portions of my assessment and plan of care.    If you have questions, please do not hesitate to call me. I look forward to following Esme Louise along with you.    Sincerely,    Mai Akbar MD    Enclosure  CC:  No Recipients    If you would like to receive this communication electronically, please contact [email or web address] or [phone] to request more information on [employer] Link access.    For providers and/or their staff who would like to refer a patient to [facility], please contact us through our one-stop-shop provider referral line, [facility], at [phone].    If you feel you have received this communication in error or would no longer like to receive these types of communications, please e-mail [email or web address]

## 2017-10-31 NOTE — PROGRESS NOTES
Subjective:      Patient ID: Esme Louise is a 47 y.o. female.    Chief Complaint: Neck Pain (Botox)    Referred by: Shikha Vazquez NP     Pain Scales  Best: 0/10  Worst: 7/10  Usually: 2/10  Today: 1/10    Neck Pain    Associated symptoms include headaches. Pertinent negatives include no chest pain, fever or weight loss.     Here for follow-up and for Botox injection.  Last time she received 150 units.  It was diluted in 3 mL.  It worked very well.  She starting having symptoms about 2 weeks ago.  No untoward side effects.  No new symptomatology.    Past Medical History:   Diagnosis Date    Anemia     Anxiety     Arthritis     Arthritis     Breast disorder     Depression     Endometriosis     Headaches, cluster     Hypertension     Mental disorder     Occipital neuralgia     Premature ovarian failure     Thyroid disease        Past Surgical History:   Procedure Laterality Date    BREAST LUMPECTOMY      BREAST LUMPECTOMY      ENDOMETRIAL ABLATION      OCCIPITAL NERVE STIMULATOR INSERTION      OOPHORECTOMY      ovary removed         Review of patient's allergies indicates:   Allergen Reactions    Pcn [penicillins] Swelling    Zoloft [sertraline] Hives and Swelling       Current Outpatient Prescriptions   Medication Sig Dispense Refill    abatacept, with maltose, (ORENCIA) 250 mg injection Inject 500 mg into the skin.      butalbital-acetaminophen-caff -40 mg Cap TK ONE C PO Q 4 HOURS  0    clonazePAM (KLONOPIN) 0.5 MG tablet TK 1 T PO BID PRN  5    cyclobenzaprine (AMRIX) 15 MG 24 hr capsule TAKE 1 CAPSULE BY MOUTH DAILY AT 6 IN THE EVENING compounded especially for you by the pharmacist  3    diclofenac (VOLTAREN) 50 MG EC tablet TK 1 T PO BID  0    esomeprazole (NEXIUM) 40 MG capsule TK 1 C PO QD  1    fluoxetine (PROZAC) 20 MG tablet Take 20 mg by mouth once daily.      levothyroxine (SYNTHROID) 100 MCG tablet Take 100 mcg by mouth once daily.      losartan (COZAAR) 50 MG  tablet       LYRICA 100 mg capsule TK ONE C PO QHS  2    methotrexate 5 MG tablet Take 30 mg by mouth once a week.      misoprostol (CYTOTEC) 200 MCG Tab 2 tabs po x1 prior to procedure 2 tablet 0    NUVIGIL 250 mg tablet TK 1 T PO QD  5    sumatriptan (IMITREX STATDOSE) 6 mg/0.5 mL kit INJECT 6MG UNDER SKIN ONCE. MAY REPEAT IN 1 HOUR IF NEEDED  2    tizanidine 2 mg Cap Take 2 mg by mouth as needed.      VITAMIN D2 50,000 unit capsule       zolmitriptan (ZOMIG) 5 MG tablet TK 1 T PO  ONCE PRN HA  3    alprazolam (XANAX) 0.5 MG tablet Take 1 tablet (0.5 mg total) by mouth as needed for Insomnia or Anxiety. 5 tablet 0     No current facility-administered medications for this visit.        Family History   Problem Relation Age of Onset    Autoimmune disease Maternal Uncle     Diabetes Maternal Grandmother     Miscarriages / Stillbirths Maternal Grandmother     Colon cancer Maternal Grandfather     Breast cancer Neg Hx     Eclampsia Neg Hx     Hypertension Neg Hx     Ovarian cancer Neg Hx        Social History     Social History    Marital status:      Spouse name: N/A    Number of children: N/A    Years of education: N/A     Occupational History    Not on file.     Social History Main Topics    Smoking status: Former Smoker    Smokeless tobacco: Not on file    Alcohol use No    Drug use: No    Sexual activity: No     Other Topics Concern    Not on file     Social History Narrative    No narrative on file         Review of Systems   Constitution: Negative for chills, fever, malaise/fatigue, weight gain and weight loss.   HENT: Negative for ear pain and hoarse voice.    Eyes: Negative for blurred vision, pain and visual disturbance.   Cardiovascular: Negative for chest pain, dyspnea on exertion and irregular heartbeat.   Respiratory: Negative for cough, shortness of breath and wheezing.    Endocrine: Negative for cold intolerance and heat intolerance.   Hematologic/Lymphatic: Negative  "for adenopathy and bleeding problem. Does not bruise/bleed easily.   Skin: Negative for color change, itching and rash.   Musculoskeletal: Positive for neck pain. Negative for back pain.   Gastrointestinal: Negative for change in bowel habit, diarrhea, hematemesis, hematochezia, melena and vomiting.   Genitourinary: Negative for flank pain, frequency, hematuria and urgency.   Neurological: Positive for headaches. Negative for difficulty with concentration, dizziness, loss of balance and seizures.   Psychiatric/Behavioral: Negative for altered mental status, depression and suicidal ideas. The patient is not nervous/anxious.    Allergic/Immunologic: Negative for HIV exposure.           Objective:      /86   Pulse 84   Temp 97.6 °F (36.4 °C) (Oral)   Resp 16   Ht 5' 6" (1.676 m)   Wt 71 kg (156 lb 8.4 oz)   BMI 25.26 kg/m²   Normocephalic.  Atraumatic.  Affect appropriate.  Breathing unlabored.  Extra ocular muscles intact.      Ortho/SPM Exam      Assessment:       Encounter Diagnoses   Name Primary?    Intractable migraine with aura without status migrainosus Yes    Isolated cervical dystonia     Muscle spasm          Plan:       Esme was seen today for neck pain.    Diagnoses and all orders for this visit:    Intractable migraine with aura without status migrainosus  -     onabotulinumtoxina injection 200 Units; Inject 200 Units into the muscle one time.    Isolated cervical dystonia  -     onabotulinumtoxina injection 200 Units; Inject 200 Units into the muscle one time.    Muscle spasm  -     onabotulinumtoxina injection 200 Units; Inject 200 Units into the muscle one time.         We discussed with the patient the assessment and recommendations. The following is the plan we agreed on:    1.  Procedure: Under clean technique, EMG guidance after discussing with the patient 200 units of Botox were mixed.  We used 150 units.  50 units were unavoidable waste.  We injected the procerus in the midline.  " We injected bilateral splenius capitis, cervical paraspinals, upper trapezius, and levator scapulae.  She did not want the sternocleidomastoid or longissimus injected today.  2.  Return as needed.  Otherwise return in 3 months to repeat the injection.

## 2018-03-13 ENCOUNTER — OFFICE VISIT (OUTPATIENT)
Dept: PAIN MEDICINE | Facility: CLINIC | Age: 48
End: 2018-03-13
Attending: ANESTHESIOLOGY
Payer: COMMERCIAL

## 2018-03-13 VITALS
SYSTOLIC BLOOD PRESSURE: 140 MMHG | TEMPERATURE: 98 F | DIASTOLIC BLOOD PRESSURE: 87 MMHG | HEIGHT: 66 IN | WEIGHT: 160.5 LBS | BODY MASS INDEX: 25.79 KG/M2 | HEART RATE: 83 BPM

## 2018-03-13 DIAGNOSIS — G43.919 INTRACTABLE MIGRAINE WITHOUT STATUS MIGRAINOSUS, UNSPECIFIED MIGRAINE TYPE: Primary | ICD-10-CM

## 2018-03-13 DIAGNOSIS — M62.838 MUSCLE SPASM: ICD-10-CM

## 2018-03-13 PROCEDURE — 64612 DESTROY NERVE FACE MUSCLE: CPT | Mod: 50,S$GLB,, | Performed by: ANESTHESIOLOGY

## 2018-03-13 PROCEDURE — 99999 PR PBB SHADOW E&M-EST. PATIENT-LVL III: CPT | Mod: PBBFAC,,, | Performed by: ANESTHESIOLOGY

## 2018-03-13 PROCEDURE — 99499 UNLISTED E&M SERVICE: CPT | Mod: S$GLB,,, | Performed by: ANESTHESIOLOGY

## 2018-03-13 RX ORDER — FENOFIBRATE 160 MG/1
TABLET ORAL
COMMUNITY
Start: 2018-02-20 | End: 2020-01-29 | Stop reason: SDUPTHER

## 2018-03-13 NOTE — PROGRESS NOTES
Subjective:      Patient ID: Esme Louise is a 47 y.o. female.    Chief Complaint: Headache    Referred by: Shikha Vazquez NP     Pain Scales  Best: 0/10  Worst: 7/10  Usually: 2/10  Today: 0/10    Headache    Pertinent negatives include no abdominal pain, back pain, blurred vision, coughing, dizziness, ear pain, eye pain, loss of balance, nausea, neck pain, numbness, vomiting or weakness.       HPI:  Patient presents today for botox injections for migraine headaches. She last received botox on 10/31/17. She received 150 units in the procerus, bilateral splenius capitis, cervical paraspinals, upper trapezius, and levator scapulae. She reports a feeling of subjective trapezius weakness for 1-2 months after the injections. She had great relief of her migraine headaches. Migraines returned about 3 weeks ago and are described as pain radiating from the back of the upper neck to the forehead in a bandlike distribution. Denies recent fevers/chills, infections, or taking blood thinners.    Past Medical History:   Diagnosis Date    Anemia     Anxiety     Arthritis     Arthritis     Breast disorder     Depression     Endometriosis     Headaches, cluster     Hypertension     Mental disorder     Occipital neuralgia     Premature ovarian failure     Thyroid disease        Past Surgical History:   Procedure Laterality Date    BREAST LUMPECTOMY      BREAST LUMPECTOMY      ENDOMETRIAL ABLATION      OCCIPITAL NERVE STIMULATOR INSERTION      OOPHORECTOMY      ovary removed         Review of patient's allergies indicates:   Allergen Reactions    Pcn [penicillins] Swelling    Zoloft [sertraline] Hives and Swelling       Current Outpatient Prescriptions   Medication Sig Dispense Refill    abatacept, with maltose, (ORENCIA) 250 mg injection Inject 500 mg into the skin.      butalbital-acetaminophen-caff -40 mg Cap TK ONE C PO Q 4 HOURS  0    clonazePAM (KLONOPIN) 0.5 MG tablet TK 1 T PO BID PRN  5     cyclobenzaprine (AMRIX) 15 MG 24 hr capsule TAKE 1 CAPSULE BY MOUTH DAILY AT 6 IN THE EVENING compounded especially for you by the pharmacist  3    diclofenac (VOLTAREN) 50 MG EC tablet TK 1 T PO BID  0    esomeprazole (NEXIUM) 40 MG capsule TK 1 C PO QD  1    fenofibrate 160 MG Tab       fluoxetine (PROZAC) 20 MG tablet Take 20 mg by mouth once daily.      levothyroxine (SYNTHROID) 100 MCG tablet Take 100 mcg by mouth once daily.      losartan (COZAAR) 50 MG tablet       LYRICA 100 mg capsule TK ONE C PO QHS  2    methotrexate 5 MG tablet Take 30 mg by mouth once a week.      misoprostol (CYTOTEC) 200 MCG Tab 2 tabs po x1 prior to procedure 2 tablet 0    NUVIGIL 250 mg tablet TK 1 T PO QD  5    sumatriptan (IMITREX STATDOSE) 6 mg/0.5 mL kit INJECT 6MG UNDER SKIN ONCE. MAY REPEAT IN 1 HOUR IF NEEDED  2    tizanidine 2 mg Cap Take 2 mg by mouth as needed.      VITAMIN D2 50,000 unit capsule       zolmitriptan (ZOMIG) 5 MG tablet TK 1 T PO  ONCE PRN HA  3    alprazolam (XANAX) 0.5 MG tablet Take 1 tablet (0.5 mg total) by mouth as needed for Insomnia or Anxiety. 5 tablet 0     No current facility-administered medications for this visit.        Family History   Problem Relation Age of Onset    Autoimmune disease Maternal Uncle     Diabetes Maternal Grandmother     Miscarriages / Stillbirths Maternal Grandmother     Colon cancer Maternal Grandfather     Breast cancer Neg Hx     Eclampsia Neg Hx     Hypertension Neg Hx     Ovarian cancer Neg Hx        Social History     Social History    Marital status:      Spouse name: N/A    Number of children: N/A    Years of education: N/A     Occupational History    Not on file.     Social History Main Topics    Smoking status: Former Smoker    Smokeless tobacco: Not on file    Alcohol use No    Drug use: No    Sexual activity: No     Other Topics Concern    Not on file     Social History Narrative    No narrative on file            "      Review of Systems   Constitution: Negative for weakness, malaise/fatigue and weight gain.   HENT: Negative for ear pain.    Eyes: Negative for blurred vision, double vision and pain.   Cardiovascular: Negative for chest pain and leg swelling.   Respiratory: Negative for cough, shortness of breath and wheezing.    Hematologic/Lymphatic: Does not bruise/bleed easily.   Skin: Negative for itching and rash.   Musculoskeletal: Negative for back pain, neck pain and stiffness.        Headaches   Gastrointestinal: Negative for abdominal pain, diarrhea, nausea and vomiting.   Genitourinary: Negative for flank pain, frequency, pelvic pain and urgency.   Neurological: Positive for headaches. Negative for dizziness, loss of balance, numbness and vertigo.   Psychiatric/Behavioral: Negative for depression and hallucinations.           Objective:      BP (!) 140/87   Pulse 83   Temp 97.8 °F (36.6 °C)   Ht 5' 6" (1.676 m)   Wt 72.8 kg (160 lb 7.9 oz)   BMI 25.90 kg/m²   Normocephalic.  Atraumatic.  Affect appropriate.  Breathing unlabored.  Extra ocular muscles intact.      Ortho/SPM Exam      Assessment:       Encounter Diagnoses   Name Primary?    Intractable migraine without status migrainosus, unspecified migraine type Yes    Muscle spasm          Plan:       Esme was seen today for headache.    Diagnoses and all orders for this visit:    Intractable migraine without status migrainosus, unspecified migraine type  -     onabotulinumtoxina injection 200 Units; Inject 200 Units into the muscle one time.    Muscle spasm  -     onabotulinumtoxina injection 200 Units; Inject 200 Units into the muscle one time.       We discussed with the patient the assessment and recommendations. The following is the plan we agreed on:  1. Procedure: Informed consent was obtained. After discussing with the patient, 200 units of Botox were mixed.  We used 150 units. 50 units were unavoidable waste. The total volume was 3 mL using " sterile, preservative-free normal saline.  We injected the procerus in the midline.  We injected bilateral , frontalis, temporalis, splenius capitis, cervical paraspinals, upper trapezius, and levator scapulae.  The patient tolerated procedure well.  2.  Return as needed.  Otherwise return in 3 months to repeat the injection.    Ej Mary MD, PGY-2  03/13/2018  I have personally taken the history and examined this patient and agree with the resident's note as stated above.

## 2018-03-13 NOTE — LETTER
March 13, 2018      Shikha Vazquez, NP  [address]  [address]  [facility] [address]           [facility]  [address] [facility] [address]  Phone: [phone]  Fax: [phone]          Patient: Esme Louise   MR Number: [id number]   YOB: 1970   Date of Visit: 3/13/2018       Dear Shikha Vazquez:    Thank you for referring Esme Louise to me for evaluation. Attached you will find relevant portions of my assessment and plan of care.    If you have questions, please do not hesitate to call me. I look forward to following Esme Louise along with you.    Sincerely,    Mai Akbar MD    Enclosure  CC:  No Recipients    If you would like to receive this communication electronically, please contact [email or web address] or [phone] to request more information on [employer] Link access.    For providers and/or their staff who would like to refer a patient to [facility], please contact us through our one-stop-shop provider referral line, [facility] [place], at [phone].    If you feel you have received this communication in error or would no longer like to receive these types of communications, please e-mail [email or web address]

## 2018-06-13 ENCOUNTER — TELEPHONE (OUTPATIENT)
Dept: PAIN MEDICINE | Facility: CLINIC | Age: 48
End: 2018-06-13

## 2018-06-13 ENCOUNTER — OFFICE VISIT (OUTPATIENT)
Dept: PAIN MEDICINE | Facility: CLINIC | Age: 48
End: 2018-06-13
Attending: ANESTHESIOLOGY
Payer: COMMERCIAL

## 2018-06-13 VITALS
HEART RATE: 85 BPM | BODY MASS INDEX: 27.17 KG/M2 | WEIGHT: 169.06 LBS | DIASTOLIC BLOOD PRESSURE: 86 MMHG | TEMPERATURE: 99 F | HEIGHT: 66 IN | SYSTOLIC BLOOD PRESSURE: 134 MMHG

## 2018-06-13 DIAGNOSIS — M62.838 MUSCLE SPASM: ICD-10-CM

## 2018-06-13 DIAGNOSIS — G43.919 INTRACTABLE MIGRAINE WITHOUT STATUS MIGRAINOSUS, UNSPECIFIED MIGRAINE TYPE: Primary | ICD-10-CM

## 2018-06-13 PROCEDURE — 99999 PR PBB SHADOW E&M-EST. PATIENT-LVL III: CPT | Mod: PBBFAC,,, | Performed by: ANESTHESIOLOGY

## 2018-06-13 PROCEDURE — 99499 UNLISTED E&M SERVICE: CPT | Mod: S$GLB,,, | Performed by: ANESTHESIOLOGY

## 2018-06-13 PROCEDURE — 64612 DESTROY NERVE FACE MUSCLE: CPT | Mod: 50,S$GLB,, | Performed by: ANESTHESIOLOGY

## 2018-06-13 RX ORDER — TRAMADOL HYDROCHLORIDE 50 MG/1
TABLET ORAL
COMMUNITY
Start: 2018-05-07 | End: 2019-11-19

## 2018-06-13 RX ORDER — PRAVASTATIN SODIUM 40 MG/1
TABLET ORAL
COMMUNITY
Start: 2018-06-02 | End: 2020-01-29 | Stop reason: SDUPTHER

## 2018-06-13 RX ORDER — ONDANSETRON 8 MG/1
TABLET, ORALLY DISINTEGRATING ORAL
COMMUNITY
Start: 2018-06-02 | End: 2022-04-07

## 2018-06-13 NOTE — TELEPHONE ENCOUNTER
Spoke to Alexia Sukhwinder from pre-service, regarding patient's Botox referral authorization.    Staff informed Alexia they had a referral authorization that  on 18 and requested if the expiration date could be extended.     Staff informed Alexia patient had a Botox appointment today and has one scheduled for 18.     Alexia stated she can request the extension, however, if they don't extend it, they can put a new request starting with today's date and can use the same referral.    Staff acknowledged information given, expressed understanding and thankfulness.

## 2018-06-13 NOTE — PROGRESS NOTES
Subjective:      Patient ID: Esme Louise is a 48 y.o. female.    Chief Complaint: No chief complaint on file.    Referred by: No ref. provider found     Pain Scales  Best: 0/10  Worst: 7/10  Usually: 2/10  Today: 0/10     She returns for follow-up and for Botox injections.  She responded well to 150 units with no untoward side effects.  She said last time the medication did not fully kick in until 6 weeks after the injection. It may have been that she waited too long to get that injection.    Past Medical History:   Diagnosis Date    Anemia     Anxiety     Arthritis     Arthritis     Breast disorder     Depression     Endometriosis     Headaches, cluster     Hypertension     Mental disorder     Occipital neuralgia     Premature ovarian failure     Thyroid disease        Past Surgical History:   Procedure Laterality Date    BREAST LUMPECTOMY      BREAST LUMPECTOMY      ENDOMETRIAL ABLATION      OCCIPITAL NERVE STIMULATOR INSERTION      OOPHORECTOMY      ovary removed         Review of patient's allergies indicates:   Allergen Reactions    Pcn [penicillins] Swelling    Zoloft [sertraline] Hives and Swelling       Current Outpatient Prescriptions   Medication Sig Dispense Refill    abatacept, with maltose, (ORENCIA) 250 mg injection Inject 500 mg into the skin.      alprazolam (XANAX) 0.5 MG tablet Take 1 tablet (0.5 mg total) by mouth as needed for Insomnia or Anxiety. 5 tablet 0    butalbital-acetaminophen-caff -40 mg Cap TK ONE C PO Q 4 HOURS  0    clonazePAM (KLONOPIN) 0.5 MG tablet TK 1 T PO BID PRN  5    cyclobenzaprine (AMRIX) 15 MG 24 hr capsule TAKE 1 CAPSULE BY MOUTH DAILY AT 6 IN THE EVENING compounded especially for you by the pharmacist  3    diclofenac (VOLTAREN) 50 MG EC tablet TK 1 T PO BID  0    fenofibrate 160 MG Tab       fluoxetine (PROZAC) 20 MG tablet Take 20 mg by mouth once daily.      levothyroxine (SYNTHROID) 100 MCG tablet Take 100 mcg by mouth once daily.   "    losartan (COZAAR) 50 MG tablet       LYRICA 100 mg capsule TK ONE C PO QHS  2    NUVIGIL 250 mg tablet TK 1 T PO QD  5    ondansetron (ZOFRAN-ODT) 8 MG TbDL       pravastatin (PRAVACHOL) 40 MG tablet       sumatriptan (IMITREX STATDOSE) 6 mg/0.5 mL kit INJECT 6MG UNDER SKIN ONCE. MAY REPEAT IN 1 HOUR IF NEEDED  2    tizanidine 2 mg Cap Take 2 mg by mouth as needed.      traMADol (ULTRAM) 50 mg tablet       zolmitriptan (ZOMIG) 5 MG tablet TK 1 T PO  ONCE PRN HA  3     No current facility-administered medications for this visit.        Family History   Problem Relation Age of Onset    Autoimmune disease Maternal Uncle     Diabetes Maternal Grandmother     Miscarriages / Stillbirths Maternal Grandmother     Colon cancer Maternal Grandfather     Breast cancer Neg Hx     Eclampsia Neg Hx     Hypertension Neg Hx     Ovarian cancer Neg Hx        Social History     Social History    Marital status:      Spouse name: N/A    Number of children: N/A    Years of education: N/A     Occupational History    Not on file.     Social History Main Topics    Smoking status: Former Smoker    Smokeless tobacco: Not on file    Alcohol use No    Drug use: No    Sexual activity: No     Other Topics Concern    Not on file     Social History Narrative    No narrative on file         ROS        Objective:      /86   Pulse 85   Temp 98.6 °F (37 °C)   Ht 5' 6" (1.676 m)   Wt 76.7 kg (169 lb 1.5 oz)   LMP 03/20/2018 (Approximate)   BMI 27.29 kg/m²   Normocephalic.  Atraumatic.  Affect appropriate.  Breathing unlabored.  Extra ocular muscles intact.      Ortho/SPM Exam      Assessment:       Encounter Diagnoses   Name Primary?    Intractable migraine without status migrainosus, unspecified migraine type Yes    Muscle spasm          Plan:       Diagnoses and all orders for this visit:    Intractable migraine without status migrainosus, unspecified migraine type    Muscle spasm    Other orders  - "     onabotulinumtoxina injection 200 Units; Inject 200 Units into the muscle one time.       We discussed with the patient the assessment and recommendations. The following is the plan we agreed on:  1.  Procedure:  Under clean technique and after discussing with the patient 200 units of Botox were makes.  We only use 150 units.  There was a waste of 50 units.  We injected the procerus in the midline.  We injected bilateral , frontalis, temporalis, splenius capitis, cervical paraspinals, upper trapezius, and levator scapulae.  Patient tolerated procedure well.  2.  Return as needed.  Otherwise follow-up in 3 months for repeat injection

## 2018-09-18 ENCOUNTER — OFFICE VISIT (OUTPATIENT)
Dept: PAIN MEDICINE | Facility: CLINIC | Age: 48
End: 2018-09-18
Attending: ANESTHESIOLOGY
Payer: COMMERCIAL

## 2018-09-18 VITALS
HEART RATE: 84 BPM | SYSTOLIC BLOOD PRESSURE: 144 MMHG | RESPIRATION RATE: 18 BRPM | DIASTOLIC BLOOD PRESSURE: 86 MMHG | BODY MASS INDEX: 26.52 KG/M2 | HEIGHT: 66 IN | WEIGHT: 165 LBS | TEMPERATURE: 99 F

## 2018-09-18 DIAGNOSIS — G24.3 ISOLATED CERVICAL DYSTONIA: ICD-10-CM

## 2018-09-18 DIAGNOSIS — G43.119 INTRACTABLE MIGRAINE WITH AURA WITHOUT STATUS MIGRAINOSUS: Primary | ICD-10-CM

## 2018-09-18 DIAGNOSIS — M62.838 MUSCLE SPASM: ICD-10-CM

## 2018-09-18 PROCEDURE — 95874 GUIDE NERV DESTR NEEDLE EMG: CPT | Mod: S$GLB,,, | Performed by: ANESTHESIOLOGY

## 2018-09-18 PROCEDURE — 99999 PR PBB SHADOW E&M-EST. PATIENT-LVL III: CPT | Mod: PBBFAC,,, | Performed by: ANESTHESIOLOGY

## 2018-09-18 PROCEDURE — 64616 CHEMODENERV MUSC NECK DYSTON: CPT | Mod: 50,S$GLB,, | Performed by: ANESTHESIOLOGY

## 2018-09-18 PROCEDURE — 99499 UNLISTED E&M SERVICE: CPT | Mod: S$GLB,,, | Performed by: ANESTHESIOLOGY

## 2018-09-18 PROCEDURE — 64612 DESTROY NERVE FACE MUSCLE: CPT | Mod: S$GLB,,, | Performed by: ANESTHESIOLOGY

## 2018-09-18 NOTE — PROGRESS NOTES
Subjective:      Patient ID: Esme Louise is a 48 y.o. female.    Chief Complaint: Follow-up    Referred by: Shikha Vazquez NP     Pain Scales  Best: 0/10  Worst: 6/10  Usually: 2/10  Today: 1/10        Interventional Pain History  ***  Past Medical History:   Diagnosis Date    Anemia     Anxiety     Arthritis     Arthritis     Breast disorder     Depression     Endometriosis     Headaches, cluster     Hypertension     Mental disorder     Occipital neuralgia     Premature ovarian failure     Thyroid disease        Past Surgical History:   Procedure Laterality Date    BLOCK, NERVE, FACET JOINT, MEDIAL BRANCH, CERVICAL Bilateral 2/24/2014    [facility]    BREAST LUMPECTOMY      BREAST LUMPECTOMY      ENDOMETRIAL ABLATION      OCCIPITAL NERVE STIMULATOR INSERTION      OOPHORECTOMY      ovary removed      RADIOFREQUENCY THERMAL COAGULATION, NERVE, SPINAL, CERVICAL, MEDIAL BRANCH OF POSTERIOR RAMUS Left 3/27/2014    [facility]    RADIOFREQUENCY THERMAL COAGULATION, NERVE, SPINAL, CERVICAL, MEDIAL BRANCH OF POSTERIOR RAMUS Right 3/20/2014    [facility] at [facility]       Review of patient's allergies indicates:   Allergen Reactions    Pcn [penicillins] Swelling    Zoloft [sertraline] Hives and Swelling       Current Outpatient Medications   Medication Sig Dispense Refill    abatacept, with maltose, (ORENCIA) 250 mg injection Inject 500 mg into the skin.      alprazolam (XANAX) 0.5 MG tablet Take 1 tablet (0.5 mg total) by mouth as needed for Insomnia or Anxiety. 5 tablet 0    butalbital-acetaminophen-caff -40 mg Cap TK ONE C PO Q 4 HOURS  0    clonazePAM (KLONOPIN) 0.5 MG tablet TK 1 T PO BID PRN  5    cyclobenzaprine (AMRIX) 15 MG 24 hr capsule TAKE 1 CAPSULE BY MOUTH DAILY AT 6 IN THE EVENING compounded especially for you by the pharmacist  3    diclofenac (VOLTAREN) 50 MG EC tablet TK 1 T PO  BID  0    fenofibrate 160 MG Tab       fluoxetine (PROZAC) 20 MG tablet Take 20 mg by mouth once daily.      levothyroxine (SYNTHROID) 100 MCG tablet Take 100 mcg by mouth once daily.      losartan (COZAAR) 50 MG tablet       LYRICA 100 mg capsule TK ONE C PO QHS  2    NUVIGIL 250 mg tablet TK 1 T PO QD  5    ondansetron (ZOFRAN-ODT) 8 MG TbDL       pravastatin (PRAVACHOL) 40 MG tablet       sumatriptan (IMITREX STATDOSE) 6 mg/0.5 mL kit INJECT 6MG UNDER SKIN ONCE. MAY REPEAT IN 1 HOUR IF NEEDED  2    tizanidine 2 mg Cap Take 2 mg by mouth as needed.      traMADol (ULTRAM) 50 mg tablet       zolmitriptan (ZOMIG) 5 MG tablet TK 1 T PO  ONCE PRN HA  3     No current facility-administered medications for this visit.        Family History   Problem Relation Age of Onset    Autoimmune disease Maternal Uncle     Diabetes Maternal Grandmother     Miscarriages / Stillbirths Maternal Grandmother     Colon cancer Maternal Grandfather     Breast cancer Neg Hx     Eclampsia Neg Hx     Hypertension Neg Hx     Ovarian cancer Neg Hx        Social History     Socioeconomic History    Marital status:      Spouse name: Not on file    Number of children: Not on file    Years of education: Not on file    Highest education level: Not on file   Social Needs    Financial resource strain: Not on file    Food insecurity - worry: Not on file    Food insecurity - inability: Not on file    Transportation needs - medical: Not on file    Transportation needs - non-medical: Not on file   Occupational History    Not on file   Tobacco Use    Smoking status: Former Smoker   Substance and Sexual Activity    Alcohol use: No    Drug use: No    Sexual activity: No     Partners: Female     Birth control/protection: None   Other Topics Concern    Not on file   Social History Narrative    Not on file           Review of Systems   Constitution: Negative for chills, fever, malaise/fatigue, weight gain and weight  "loss.   HENT: Negative for ear pain and hoarse voice.    Eyes: Negative for blurred vision, pain and visual disturbance.   Cardiovascular: Negative for chest pain, dyspnea on exertion and irregular heartbeat.   Respiratory: Negative for cough, shortness of breath and wheezing.    Endocrine: Negative for cold intolerance and heat intolerance.   Hematologic/Lymphatic: Negative for adenopathy and bleeding problem. Does not bruise/bleed easily.   Skin: Negative for color change, itching and rash.   Musculoskeletal: Positive for back pain and neck pain.   Gastrointestinal: Negative for change in bowel habit, diarrhea, hematemesis, hematochezia, melena and vomiting.   Genitourinary: Negative for flank pain, frequency, hematuria and urgency.   Neurological: Negative for difficulty with concentration, dizziness, headaches, loss of balance and seizures.   Psychiatric/Behavioral: Negative for altered mental status, depression and suicidal ideas. The patient is not nervous/anxious.    Allergic/Immunologic: Negative for HIV exposure.           Objective:   BP (!) 144/86   Pulse 84   Temp 98.6 °F (37 °C) (Oral)   Resp 18   Ht 5' 6" (1.676 m)   Wt 74.8 kg (165 lb)   BMI 26.63 kg/m²   Pain Disability Index Review:  Last 3 PDI Scores 6/13/2018 3/13/2018 10/31/2017   Pain Disability Index (PDI) 17 6 9     Normocephalic.  Atraumatic.  Affect appropriate.  Breathing unlabored.  Extra ocular muscles intact.           Ortho/SPM Exam      Assessment:       No diagnosis found.      Plan:   We discussed with the patient the assessment and recommendations. The following is the plan we agreed on:        There are no diagnoses linked to this encounter.           "

## 2018-09-18 NOTE — PROGRESS NOTES
Subjective:      Patient ID: Esme Louise is a 48 y.o. female.    Chief Complaint: Follow-up    Referred by: Shikha Vazquez NP     HPI 48 y.o. female here for follow-up and for Botox injections.  LCV on 6/13 and at that time we used 150 units of botox and injected the bilateral , frontalis, temporalis, splenius capitis, cervical paraspinals, upper trapezius, and levator scapulae.   She responded well with no untoward side effects.  She states the botox decreases her HAs from daily or multiple times per week to just 2 in the past month.    Previous Interventions:  3/27/14: Left C2, C3 MB RFA  3/20/14: Right C2, C3 MB RFA  2/24/14: Bilateral C2 & C3 MB Block    Past Medical History:   Diagnosis Date    Anemia     Anxiety     Arthritis     Arthritis     Breast disorder     Depression     Endometriosis     Headaches, cluster     Hypertension     Mental disorder     Occipital neuralgia     Premature ovarian failure     Thyroid disease        Past Surgical History:   Procedure Laterality Date    BLOCK, NERVE, FACET JOINT, MEDIAL BRANCH, CERVICAL Bilateral 2/24/2014    [facility]    BREAST LUMPECTOMY      BREAST LUMPECTOMY      ENDOMETRIAL ABLATION      OCCIPITAL NERVE STIMULATOR INSERTION      OOPHORECTOMY      ovary removed      RADIOFREQUENCY THERMAL COAGULATION, NERVE, SPINAL, CERVICAL, MEDIAL BRANCH OF POSTERIOR RAMUS Left 3/27/2014    Performed [facility]    RADIOFREQUENCY THERMAL COAGULATION, NERVE, SPINAL, CERVICAL, MEDIAL BRANCH OF POSTERIOR RAMUS Right 3/20/2014    Performed by [facility] at [facility]       Review of patient's allergies indicates:   Allergen Reactions    Pcn [penicillins] Swelling    Zoloft [sertraline] Hives and Swelling       Current Outpatient Medications   Medication Sig Dispense Refill    abatacept, with maltose, (ORENCIA) 250 mg injection Inject 500 mg into the skin.      alprazolam  (XANAX) 0.5 MG tablet Take 1 tablet (0.5 mg total) by mouth as needed for Insomnia or Anxiety. 5 tablet 0    butalbital-acetaminophen-caff -40 mg Cap TK ONE C PO Q 4 HOURS  0    clonazePAM (KLONOPIN) 0.5 MG tablet TK 1 T PO BID PRN  5    cyclobenzaprine (AMRIX) 15 MG 24 hr capsule TAKE 1 CAPSULE BY MOUTH DAILY AT 6 IN THE EVENING compounded especially for you by the pharmacist  3    diclofenac (VOLTAREN) 50 MG EC tablet TK 1 T PO BID  0    fenofibrate 160 MG Tab       fluoxetine (PROZAC) 20 MG tablet Take 20 mg by mouth once daily.      levothyroxine (SYNTHROID) 100 MCG tablet Take 100 mcg by mouth once daily.      losartan (COZAAR) 50 MG tablet       LYRICA 100 mg capsule TK ONE C PO QHS  2    NUVIGIL 250 mg tablet TK 1 T PO QD  5    ondansetron (ZOFRAN-ODT) 8 MG TbDL       pravastatin (PRAVACHOL) 40 MG tablet       sumatriptan (IMITREX STATDOSE) 6 mg/0.5 mL kit INJECT 6MG UNDER SKIN ONCE. MAY REPEAT IN 1 HOUR IF NEEDED  2    tizanidine 2 mg Cap Take 2 mg by mouth as needed.      traMADol (ULTRAM) 50 mg tablet       zolmitriptan (ZOMIG) 5 MG tablet TK 1 T PO  ONCE PRN HA  3     No current facility-administered medications for this visit.        Family History   Problem Relation Age of Onset    Autoimmune disease Maternal Uncle     Diabetes Maternal Grandmother     Miscarriages / Stillbirths Maternal Grandmother     Colon cancer Maternal Grandfather     Breast cancer Neg Hx     Eclampsia Neg Hx     Hypertension Neg Hx     Ovarian cancer Neg Hx        Social History     Socioeconomic History    Marital status:      Spouse name: Not on file    Number of children: Not on file    Years of education: Not on file    Highest education level: Not on file   Social Needs    Financial resource strain: Not on file    Food insecurity - worry: Not on file    Food insecurity - inability: Not on file    Transportation needs - medical: Not on file    Transportation needs - non-medical:  "Not on file   Occupational History    Not on file   Tobacco Use    Smoking status: Former Smoker   Substance and Sexual Activity    Alcohol use: No    Drug use: No    Sexual activity: No     Partners: Female     Birth control/protection: None   Other Topics Concern    Not on file   Social History Narrative    Not on file         ROS        Objective:      BP (!) 144/86   Pulse 84   Temp 98.6 °F (37 °C) (Oral)   Resp 18   Ht 5' 6" (1.676 m)   Wt 74.8 kg (165 lb)   BMI 26.63 kg/m²   BP (!) 144/86   Pulse 84   Temp 98.6 °F (37 °C) (Oral)   Resp 18   Ht 5' 6" (1.676 m)   Wt 74.8 kg (165 lb)   BMI 26.63 kg/m²   GEN: No acute distress. Calm, comfortable  HENT: Normocephalic, atraumatic, moist mucous membranes  EYE: Anicteric sclera, non-injected  CV: Non-diaphoretic. Pulses 2+ in BUE.  CHEST: Breathing comfortably. Chest expansion symmetric  EXT: No clubbing, cyanosis, edema  Psych: Mood and affect are appropriate  GAIT: Independent, normal ambulation  Neuro Exam:     Alert. Speech is fluent and appropriate     Face symmetric, EOMI, Tongue midline, Uvula midline     Strength: 5/5 throughout BUE      Sensation: Grossly intact to LT in BUE      Reflexes: Decreased in b/l biceps, brachioradialis     Tone: No abnormality appreciated      (-) Anderson bilaterally     Finger to Nose testing WNL                Ortho/SPM Exam      Assessment:       Encounter Diagnoses   Name Primary?    Intractable migraine with aura without status migrainosus Yes    Isolated cervical dystonia     Muscle spasm          Plan:       Esme was seen today for follow-up.    Diagnoses and all orders for this visit:    Intractable migraine with aura without status migrainosus  -     onabotulinumtoxina injection 200 Units; Inject 200 Units into the muscle one time.    Isolated cervical dystonia  -     onabotulinumtoxina injection 200 Units; Inject 200 Units into the muscle one time.    Muscle spasm  -     onabotulinumtoxina injection " 200 Units; Inject 200 Units into the muscle one time.       We discussed with the patient the assessment and recommendations. The following is the plan we agreed on:  1.  Procedure:  Under clean technique and after discussing with the patient 200 units of Botox were makes.  We only use 150 units.  There was a waste of 50 units.  We injected the procerus in the midline.  We injected bilateral , frontalis, temporalis, splenius capitis, cervical paraspinals, upper trapezius, and levator scapulae.  Patient tolerated procedure well.  2.  Return as needed.  Otherwise follow-up in 3 months for repeat injection    Laila Saleem MD   fellow    I have personally taken the history and examined this patient and agree with the fellow's note as stated above.

## 2018-09-18 NOTE — LETTER
September 18, 2018      Shikha Vazquez, NP  [address]  [address]  [facility] [address]           [facility]  [address]  [facility] [address]  Phone: [phone]  Fax: [phone]          Patient: Esme Louise   MR Number: [id number]   YOB: 1970   Date of Visit: 9/18/2018       Dear Shikha Vazquez:    Thank you for referring Esme Louise to me for evaluation. Attached you will find relevant portions of my assessment and plan of care.    If you have questions, please do not hesitate to call me. I look forward to following Esme Louise along with you.    Sincerely,    Mai Akbar MD    Enclosure  CC:  No Recipients    If you would like to receive this communication electronically, please contact [email or web address] or [phone] to request more information on [email or web address] access.    For providers and/or their staff who would like to refer a patient to [facility], please contact us through our one-stop-shop provider referral line, [facility], at [phone].    If you feel you have received this communication in error or would no longer like to receive these types of communications, please e-mail [email or web address]

## 2018-12-18 ENCOUNTER — OFFICE VISIT (OUTPATIENT)
Dept: PAIN MEDICINE | Facility: CLINIC | Age: 48
End: 2018-12-18
Attending: ANESTHESIOLOGY
Payer: COMMERCIAL

## 2018-12-18 VITALS
WEIGHT: 160.5 LBS | BODY MASS INDEX: 25.79 KG/M2 | TEMPERATURE: 98 F | HEIGHT: 66 IN | HEART RATE: 97 BPM | DIASTOLIC BLOOD PRESSURE: 79 MMHG | SYSTOLIC BLOOD PRESSURE: 118 MMHG

## 2018-12-18 DIAGNOSIS — G24.3 ISOLATED CERVICAL DYSTONIA: Primary | ICD-10-CM

## 2018-12-18 DIAGNOSIS — M62.838 MUSCLE SPASM: ICD-10-CM

## 2018-12-18 DIAGNOSIS — G43.919 INTRACTABLE MIGRAINE WITHOUT STATUS MIGRAINOSUS, UNSPECIFIED MIGRAINE TYPE: ICD-10-CM

## 2018-12-18 PROCEDURE — 99499 UNLISTED E&M SERVICE: CPT | Mod: S$GLB,,, | Performed by: ANESTHESIOLOGY

## 2018-12-18 PROCEDURE — 99999 PR PBB SHADOW E&M-EST. PATIENT-LVL III: CPT | Mod: PBBFAC,,, | Performed by: ANESTHESIOLOGY

## 2018-12-18 PROCEDURE — 64612 DESTROY NERVE FACE MUSCLE: CPT | Mod: S$GLB,,, | Performed by: ANESTHESIOLOGY

## 2018-12-18 PROCEDURE — 64616 CHEMODENERV MUSC NECK DYSTON: CPT | Mod: 50,S$GLB,, | Performed by: ANESTHESIOLOGY

## 2018-12-18 PROCEDURE — 95874 GUIDE NERV DESTR NEEDLE EMG: CPT | Mod: S$GLB,,, | Performed by: ANESTHESIOLOGY

## 2018-12-18 NOTE — PROGRESS NOTES
Subjective:      Patient ID: Esme Louise is a 48 y.o. female.    Chief Complaint: No chief complaint on file.    Referred by: Shikha Vazquez NP     Pain Scales  Best: 0/10  Worst: 7/10  Usually: 2/10  Today: 0/10      She returns for follow-up.  She has had injection with Botox 150 units helped tremendously.  Only 2 weeks ago she started feeling some of her symptomatology recur.  No untoward side effects.  No new symptomatology.  Past Medical History:   Diagnosis Date    Anemia     Anxiety     Arthritis     Arthritis     Breast disorder     Depression     Endometriosis     Headaches, cluster     Hypertension     Mental disorder     Occipital neuralgia     Premature ovarian failure     Thyroid disease        Past Surgical History:   Procedure Laterality Date    BLOCK, NERVE, FACET JOINT, CERVICAL, MEDIAL BRANCH Bilateral 2/24/2014    [facility]    BREAST LUMPECTOMY      BREAST LUMPECTOMY      ENDOMETRIAL ABLATION      OCCIPITAL NERVE STIMULATOR INSERTION      OOPHORECTOMY      ovary removed      RADIOFREQUENCY THERMAL COAGULATION, NERVE, SPINAL, CERVICAL, POSTERIOR RAMUS, MEDIAL BRANCH Left 3/27/2014    [facility] at [facility]    RADIOFREQUENCY THERMAL COAGULATION, NERVE, SPINAL, CERVICAL, POSTERIOR RAMUS, MEDIAL BRANCH Right 3/20/2014    Per[facility] by [facility] at [facility]       Review of patient's allergies indicates:   Allergen Reactions    Pcn [penicillins] Swelling    Zoloft [sertraline] Hives and Swelling       Current Outpatient Medications   Medication Sig Dispense Refill    abatacept, with maltose, (ORENCIA) 250 mg injection Inject 500 mg into the skin.      alprazolam (XANAX) 0.5 MG tablet Take 1 tablet (0.5 mg total) by mouth as needed for Insomnia or Anxiety. 5 tablet 0    butalbital-acetaminophen-caff -40 mg Cap TK ONE C PO Q 4 HOURS  0    clonazePAM (KLONOPIN) 0.5 MG tablet TK 1 T PO BID PRN  5     cyclobenzaprine (AMRIX) 15 MG 24 hr capsule TAKE 1 CAPSULE BY MOUTH DAILY AT 6 IN THE EVENING compounded especially for you by the pharmacist  3    diclofenac (VOLTAREN) 50 MG EC tablet TK 1 T PO BID  0    fenofibrate 160 MG Tab       fluoxetine (PROZAC) 20 MG tablet Take 20 mg by mouth once daily.      levothyroxine (SYNTHROID) 100 MCG tablet Take 100 mcg by mouth once daily.      losartan (COZAAR) 50 MG tablet       LYRICA 100 mg capsule TK ONE C PO QHS  2    NUVIGIL 250 mg tablet TK 1 T PO QD  5    ondansetron (ZOFRAN-ODT) 8 MG TbDL       pravastatin (PRAVACHOL) 40 MG tablet       sumatriptan (IMITREX STATDOSE) 6 mg/0.5 mL kit INJECT 6MG UNDER SKIN ONCE. MAY REPEAT IN 1 HOUR IF NEEDED  2    tizanidine 2 mg Cap Take 2 mg by mouth as needed.      traMADol (ULTRAM) 50 mg tablet       zolmitriptan (ZOMIG) 5 MG tablet TK 1 T PO  ONCE PRN HA  3     No current facility-administered medications for this visit.        Family History   Problem Relation Age of Onset    Autoimmune disease Maternal Uncle     Diabetes Maternal Grandmother     Miscarriages / Stillbirths Maternal Grandmother     Colon cancer Maternal Grandfather     Breast cancer Neg Hx     Eclampsia Neg Hx     Hypertension Neg Hx     Ovarian cancer Neg Hx        Social History     Socioeconomic History    Marital status:      Spouse name: Not on file    Number of children: Not on file    Years of education: Not on file    Highest education level: Not on file   Social Needs    Financial resource strain: Not on file    Food insecurity - worry: Not on file    Food insecurity - inability: Not on file    Transportation needs - medical: Not on file    Transportation needs - non-medical: Not on file   Occupational History    Not on file   Tobacco Use    Smoking status: Former Smoker   Substance and Sexual Activity    Alcohol use: No    Drug use: No    Sexual activity: No     Partners: Female     Birth control/protection: None  "  Other Topics Concern    Not on file   Social History Narrative    Not on file           Review of Systems   Constitution: Negative for chills, fever, night sweats, weight gain and weight loss.   Cardiovascular: Negative for chest pain.   Hematologic/Lymphatic: Does not bruise/bleed easily.   Skin: Negative for itching and rash.   Musculoskeletal: Negative for back pain, falls, myalgias and neck pain.   Gastrointestinal: Negative for diarrhea, nausea and vomiting.   Neurological: Positive for headaches.   Psychiatric/Behavioral: Negative for depression, hallucinations and suicidal ideas.           Objective:      /79   Pulse 97   Temp 97.7 °F (36.5 °C)   Ht 5' 6" (1.676 m)   Wt 72.8 kg (160 lb 7.9 oz)   BMI 25.90 kg/m²   Normocephalic.  Atraumatic.  Affect appropriate.  Breathing unlabored.  Extra ocular muscles intact.  Pain Disability Index Review:  Last 3 PDI Scores 12/18/2018 9/18/2018 6/13/2018   Pain Disability Index (PDI) 14 17 17         Ortho/SPM Exam      Assessment:       Encounter Diagnoses   Name Primary?    Isolated cervical dystonia Yes    Muscle spasm     Intractable migraine without status migrainosus, unspecified migraine type          Plan:       Diagnoses and all orders for this visit:    Isolated cervical dystonia  -     onabotulinumtoxina injection 200 Units    Muscle spasm  -     onabotulinumtoxina injection 200 Units    Intractable migraine without status migrainosus, unspecified migraine type  -     onabotulinumtoxina injection 200 Units         We discussed with the patient the assessment and recommendations. The following is the plan we agreed on:  1.  Procedure:  Under clean technique, EMG guidance after discussing with the patient we used 200 units of Botox.  Total of 150 units were injected. We had waste of 50 units.  We injected the procerus in the midline.  Injected bilateral , frontalis, temporalis, splenius capitis, cervical paraspinals, upper trapezius " and levator scapulae.  The patient tolerated procedure well.  2.  Return as needed.  Otherwise follow-up in 3 months to repeat injections.

## 2018-12-18 NOTE — LETTER
December 18, 2018      Shikha Vazquez, NP  [address]  [address]  [facility] [address]           [facility]  [address]  [facility] [address]  Phone: [phone]  Fax: [phone]          Patient: Esme Louise   MR Number: [id number]   YOB: 1970   Date of Visit: 12/18/2018       Dear Shikha Vazquez:    Thank you for referring Esme Louise to me for evaluation. Attached you will find relevant portions of my assessment and plan of care.    If you have questions, please do not hesitate to call me. I look forward to following Esme Louise along with you.    Sincerely,    Mai Akbar MD    Enclosure  CC:  No Recipients    If you would like to receive this communication electronically, please contact [email or web address] or [phone] to request more information on [employer] access.    For providers and/or their staff who would like to refer a patient to [facility], please contact us through our one-stop-shop provider referral line, [facility], [place], at [phone].    If you feel you have received this communication in error or would no longer like to receive these types of communications, please e-mail [email or web address]

## 2019-03-18 ENCOUNTER — TELEPHONE (OUTPATIENT)
Dept: PAIN MEDICINE | Facility: CLINIC | Age: 49
End: 2019-03-18

## 2019-03-18 DIAGNOSIS — G24.3 ISOLATED CERVICAL DYSTONIA: Primary | ICD-10-CM

## 2019-03-19 ENCOUNTER — OFFICE VISIT (OUTPATIENT)
Dept: PAIN MEDICINE | Facility: CLINIC | Age: 49
End: 2019-03-19
Attending: ANESTHESIOLOGY
Payer: COMMERCIAL

## 2019-03-19 VITALS
RESPIRATION RATE: 18 BRPM | DIASTOLIC BLOOD PRESSURE: 76 MMHG | TEMPERATURE: 98 F | HEART RATE: 89 BPM | BODY MASS INDEX: 26.22 KG/M2 | WEIGHT: 163.13 LBS | HEIGHT: 66 IN | SYSTOLIC BLOOD PRESSURE: 139 MMHG

## 2019-03-19 DIAGNOSIS — G43.919 INTRACTABLE MIGRAINE WITHOUT STATUS MIGRAINOSUS, UNSPECIFIED MIGRAINE TYPE: Primary | ICD-10-CM

## 2019-03-19 DIAGNOSIS — G24.3 ISOLATED CERVICAL DYSTONIA: ICD-10-CM

## 2019-03-19 DIAGNOSIS — M62.838 MUSCLE SPASM: ICD-10-CM

## 2019-03-19 PROCEDURE — 95874 GUIDE NERV DESTR NEEDLE EMG: CPT | Mod: S$GLB,,, | Performed by: ANESTHESIOLOGY

## 2019-03-19 PROCEDURE — 99999 PR PBB SHADOW E&M-EST. PATIENT-LVL III: ICD-10-PCS | Mod: PBBFAC,,, | Performed by: ANESTHESIOLOGY

## 2019-03-19 PROCEDURE — 99499 UNLISTED E&M SERVICE: CPT | Mod: S$GLB,,, | Performed by: ANESTHESIOLOGY

## 2019-03-19 PROCEDURE — 99499 NO LOS: ICD-10-PCS | Mod: S$GLB,,, | Performed by: ANESTHESIOLOGY

## 2019-03-19 PROCEDURE — 95874 PR NEEDLE EMG GUIDANCE FOR CHEMODENERVATION: ICD-10-PCS | Mod: S$GLB,,, | Performed by: ANESTHESIOLOGY

## 2019-03-19 PROCEDURE — 64616 PR CHEMODENERVATION NECK MUSCLES EXC LARNYNX, UNI: ICD-10-PCS | Mod: 50,S$GLB,, | Performed by: ANESTHESIOLOGY

## 2019-03-19 PROCEDURE — 64612 PR DEST,NERVE,FACIAL: ICD-10-PCS | Mod: S$GLB,,, | Performed by: ANESTHESIOLOGY

## 2019-03-19 PROCEDURE — 64612 DESTROY NERVE FACE MUSCLE: CPT | Mod: S$GLB,,, | Performed by: ANESTHESIOLOGY

## 2019-03-19 PROCEDURE — 99999 PR PBB SHADOW E&M-EST. PATIENT-LVL III: CPT | Mod: PBBFAC,,, | Performed by: ANESTHESIOLOGY

## 2019-03-19 PROCEDURE — 64616 CHEMODENERV MUSC NECK DYSTON: CPT | Mod: 50,S$GLB,, | Performed by: ANESTHESIOLOGY

## 2019-03-19 NOTE — PROGRESS NOTES
Subjective:      Patient ID: Esme Louise is a 48 y.o. female.    Chief Complaint: Follow-up    Referred by: Mai Akbar MD     Pain Scales  Best: 1/10  Worst: 5/10  Usually: 1/10  Today: 1/10      Esme Louise returns for follow up Botox  She notes that last injection with Botox 150 units helped tremendously. Recently, she started feeling some of her symptomatology recur.  No untoward side effects from last injections.  No new symptomatology      Past Medical History:   Diagnosis Date    Anemia     Anxiety     Arthritis     Arthritis     Breast disorder     Depression     Endometriosis     Headaches, cluster     Hypertension     Mental disorder     Occipital neuralgia     Premature ovarian failure     Thyroid disease        Past Surgical History:   Procedure Laterality Date    BLOCK, NERVE, FACET JOINT, CERVICAL, MEDIAL BRANCH Bilateral 2/24/2014    [facility]    BREAST LUMPECTOMY      BREAST LUMPECTOMY      ENDOMETRIAL ABLATION      OCCIPITAL NERVE STIMULATOR INSERTION      OOPHORECTOMY      ovary removed      RADIOFREQUENCY THERMAL COAGULATION, NERVE, SPINAL, CERVICAL, POSTERIOR RAMUS, MEDIAL BRANCH Left 3/27/2014    Per[facility] by [facility] at [facility]    RADIOFREQUENCY THERMAL COAGULATION, NERVE, SPINAL, CERVICAL, POSTERIOR RAMUS, MEDIAL BRANCH Right 3/20/2014    Performed by Mai [facility]bar MD at [facility]       Review of patient's allergies indicates:   Allergen Reactions    Pcn [penicillins] Swelling    Zoloft [sertraline] Hives and Swelling       Current Outpatient Medications   Medication Sig Dispense Refill    abatacept, with maltose, (ORENCIA) 250 mg injection Inject 500 mg into the skin.      alprazolam (XANAX) 0.5 MG tablet Take 1 tablet (0.5 mg total) by mouth as needed for Insomnia or Anxiety. 5 tablet 0    butalbital-acetaminophen-caff -40 mg Cap TK ONE C PO Q 4 HOURS  0    clonazePAM (KLONOPIN) 0.5 MG tablet TK 1 T PO  BID PRN  5    cyclobenzaprine (AMRIX) 15 MG 24 hr capsule TAKE 1 CAPSULE BY MOUTH DAILY AT 6 IN THE EVENING compounded especially for you by the pharmacist  3    diclofenac (VOLTAREN) 50 MG EC tablet TK 1 T PO BID  0    fenofibrate 160 MG Tab       fluoxetine (PROZAC) 20 MG tablet Take 20 mg by mouth once daily.      levothyroxine (SYNTHROID) 100 MCG tablet Take 100 mcg by mouth once daily.      losartan (COZAAR) 50 MG tablet       LYRICA 100 mg capsule TK ONE C PO QHS  2    NUVIGIL 250 mg tablet TK 1 T PO QD  5    ondansetron (ZOFRAN-ODT) 8 MG TbDL       pravastatin (PRAVACHOL) 40 MG tablet       sumatriptan (IMITREX STATDOSE) 6 mg/0.5 mL kit INJECT 6MG UNDER SKIN ONCE. MAY REPEAT IN 1 HOUR IF NEEDED  2    tizanidine 2 mg Cap Take 2 mg by mouth as needed.      traMADol (ULTRAM) 50 mg tablet       zolmitriptan (ZOMIG) 5 MG tablet TK 1 T PO  ONCE PRN HA  3     No current facility-administered medications for this visit.        Family History   Problem Relation Age of Onset    Autoimmune disease Maternal Uncle     Diabetes Maternal Grandmother     Miscarriages / Stillbirths Maternal Grandmother     Colon cancer Maternal Grandfather     Breast cancer Neg Hx     Eclampsia Neg Hx     Hypertension Neg Hx     Ovarian cancer Neg Hx        Social History     Socioeconomic History    Marital status:      Spouse name: Not on file    Number of children: Not on file    Years of education: Not on file    Highest education level: Not on file   Social Needs    Financial resource strain: Not on file    Food insecurity - worry: Not on file    Food insecurity - inability: Not on file    Transportation needs - medical: Not on file    Transportation needs - non-medical: Not on file   Occupational History    Not on file   Tobacco Use    Smoking status: Former Smoker   Substance and Sexual Activity    Alcohol use: No    Drug use: No    Sexual activity: No     Partners: Female     Birth  "control/protection: None   Other Topics Concern    Not on file   Social History Narrative    Not on file           Review of Systems   Constitution: Negative for chills, fever, malaise/fatigue, weight gain and weight loss.   HENT: Negative for ear pain and hoarse voice.    Eyes: Negative for blurred vision, pain and visual disturbance.   Cardiovascular: Negative for chest pain, dyspnea on exertion and irregular heartbeat.   Respiratory: Negative for cough, shortness of breath and wheezing.    Endocrine: Negative for cold intolerance and heat intolerance.   Hematologic/Lymphatic: Negative for adenopathy and bleeding problem. Does not bruise/bleed easily.   Skin: Negative for color change, itching and rash.   Musculoskeletal: Positive for back pain and neck pain.   Gastrointestinal: Negative for change in bowel habit, diarrhea, hematemesis, hematochezia, melena and vomiting.   Genitourinary: Negative for flank pain, frequency, hematuria and urgency.   Neurological: Negative for difficulty with concentration, dizziness, headaches, loss of balance and seizures.   Psychiatric/Behavioral: Negative for altered mental status, depression and suicidal ideas. The patient is not nervous/anxious.    Allergic/Immunologic: Negative for HIV exposure.           Objective:   /76   Pulse 89   Temp 98 °F (36.7 °C) (Oral)   Resp 18   Ht 5' 6" (1.676 m)   Wt 74 kg (163 lb 2.3 oz)   BMI 26.33 kg/m²   Pain Disability Index Review:  Last 3 PDI Scores 3/19/2019 12/18/2018 9/18/2018   Pain Disability Index (PDI) 12 14 17     Normocephalic.  Atraumatic.  Affect appropriate.  Breathing unlabored.  Extra ocular muscles intact.           Ortho/SPM Exam      Assessment:       Encounter Diagnoses   Name Primary?    Isolated cervical dystonia     Intractable migraine without status migrainosus, unspecified migraine type Yes    Muscle spasm          Plan:   We discussed with the patient the assessment and recommendations. The " following is the plan we agreed on:    1.  Procedure:  Under clean technique, EMG guidance after discussing with the patient we used 200 units of Botox.  Total of 150 units were injected. We had waste of 50 units.  We injected the procerus in the midline.  Injected bilateral , frontalis, temporalis, splenius capitis, cervical paraspinals, upper trapezius and levator scapulae.  The patient tolerated procedure well.  2.  Return as needed.  Otherwise follow-up in 3 months to repeat injections.          Esme was seen today for follow-up.    Diagnoses and all orders for this visit:    Intractable migraine without status migrainosus, unspecified migraine type  -     onabotulinumtoxina injection 200 Units    Isolated cervical dystonia  -     onabotulinumtoxina injection 200 Units    Muscle spasm  -     onabotulinumtoxina injection 200 Units        I have personally taken the history and examined this patient and agree with the resident's note as stated above.

## 2019-06-05 DIAGNOSIS — G43.019 INTRACTABLE MIGRAINE WITHOUT AURA AND WITHOUT STATUS MIGRAINOSUS: Primary | ICD-10-CM

## 2019-06-14 ENCOUNTER — TELEPHONE (OUTPATIENT)
Dept: PAIN MEDICINE | Facility: CLINIC | Age: 49
End: 2019-06-14

## 2019-06-14 NOTE — TELEPHONE ENCOUNTER
My name is Staff, I am contacting you from [facility] regarding your appointment scheduled for 06.18.19, with Provider, just confirming you will be able to make it.    If you feel you need to reschedule or canceled please give our office a call so we can better assist you.      Staff requesting patient to arrive 15 mins ahead of schedule appointment time.    **Staff left a voicemail reminding pt of their appt.**

## 2019-07-18 ENCOUNTER — OFFICE VISIT (OUTPATIENT)
Dept: URGENT CARE | Facility: CLINIC | Age: 49
End: 2019-07-18
Payer: COMMERCIAL

## 2019-07-18 VITALS
RESPIRATION RATE: 15 BRPM | OXYGEN SATURATION: 98 % | SYSTOLIC BLOOD PRESSURE: 152 MMHG | BODY MASS INDEX: 26.36 KG/M2 | DIASTOLIC BLOOD PRESSURE: 79 MMHG | WEIGHT: 164 LBS | TEMPERATURE: 98 F | HEIGHT: 66 IN | HEART RATE: 85 BPM

## 2019-07-18 DIAGNOSIS — R09.82 POST-NASAL DRIP: ICD-10-CM

## 2019-07-18 DIAGNOSIS — J02.9 SORE THROAT: ICD-10-CM

## 2019-07-18 DIAGNOSIS — I10 HYPERTENSION, UNSPECIFIED TYPE: ICD-10-CM

## 2019-07-18 DIAGNOSIS — R05.8 COUGH PRESENT FOR GREATER THAN 3 WEEKS: Primary | ICD-10-CM

## 2019-07-18 LAB
CTP QC/QA: YES
S PYO RRNA THROAT QL PROBE: NEGATIVE

## 2019-07-18 PROCEDURE — 3008F BODY MASS INDEX DOCD: CPT | Mod: CPTII,S$GLB,, | Performed by: NURSE PRACTITIONER

## 2019-07-18 PROCEDURE — 3008F PR BODY MASS INDEX (BMI) DOCUMENTED: ICD-10-PCS | Mod: CPTII,S$GLB,, | Performed by: NURSE PRACTITIONER

## 2019-07-18 PROCEDURE — 87880 POCT RAPID STREP A: ICD-10-PCS | Mod: QW,S$GLB,, | Performed by: NURSE PRACTITIONER

## 2019-07-18 PROCEDURE — 99203 PR OFFICE/OUTPT VISIT, NEW, LEVL III, 30-44 MIN: ICD-10-PCS | Mod: S$GLB,,, | Performed by: NURSE PRACTITIONER

## 2019-07-18 PROCEDURE — 99203 OFFICE O/P NEW LOW 30 MIN: CPT | Mod: S$GLB,,, | Performed by: NURSE PRACTITIONER

## 2019-07-18 PROCEDURE — 87880 STREP A ASSAY W/OPTIC: CPT | Mod: QW,S$GLB,, | Performed by: NURSE PRACTITIONER

## 2019-07-18 RX ORDER — PREDNISONE 20 MG/1
40 TABLET ORAL DAILY
Qty: 10 TABLET | Refills: 0 | Status: SHIPPED | OUTPATIENT
Start: 2019-07-18 | End: 2019-07-23

## 2019-07-18 RX ORDER — PROMETHAZINE HYDROCHLORIDE AND CODEINE PHOSPHATE 6.25; 1 MG/5ML; MG/5ML
5 SOLUTION ORAL EVERY 6 HOURS PRN
Qty: 240 ML | Refills: 0 | Status: SHIPPED | OUTPATIENT
Start: 2019-07-18 | End: 2019-07-25

## 2019-07-18 RX ORDER — FLUTICASONE PROPIONATE 50 MCG
2 SPRAY, SUSPENSION (ML) NASAL 2 TIMES DAILY
Qty: 15.8 ML | Refills: 0 | Status: SHIPPED | OUTPATIENT
Start: 2019-07-18 | End: 2019-11-19

## 2019-07-18 NOTE — PATIENT INSTRUCTIONS
If your condition worsens or fails to improve we recommend that you receive another evaluation at the emergency room immediately or contact your primary medical clinic to discuss your concerns.   You must understand that you have received an Urgent Care treatment only and that you may be released before all of your medical problems are known or treated. You, the patient, will arrange for follow up care as instructed.   Please return here or go to the Emergency Department for any concerns or worsening of condition.  If you were prescribed antibiotics, please take them to completion.  If you were prescribed a narcotic medication, do not drive or operate heavy equipment or machinery while taking these medications.  Please follow up with your primary care doctor or specialist as needed.    If you  smoke, please stop smoking.    Cough, Chronic, Uncertain Cause (Adult)    Everyone has had a cough as part of the common cold, flu, or bronchitis. This kind of cough occurs along with an achy feeling, low-grade fever, nasal and sinus congestion, and a scratchy or sore throat. This usually gets better in 2 to 3 weeks. A cough that lasts longer than 3 weeks may be due to other causes.  If your cough does not improve over the next 2 weeks, further testing may be needed. Follow up with your healthcare provider as advised. Cough suppressants may be recommended. Based on your exam today, the exact cause of your cough is not certain. Below are some common causes for persistent cough.  Smokers cough  Smokers cough doesnt go away. If you continue to smoke, it only gets worse. The cough is from irritation in the air passages. Talk to your healthcare provider about quitting. Medicines or nicotine-replacement products, like gum or the patch, may make quitting easier.  Postnasal drip  A cough that is worse at night may be due to postnasal drip. Excess mucus in the nose drains from the back of your nose to your throat. This triggers the  cough reflex. Postnasal drip may be due to a sinus infection or allergy. Common allergens include dust, tobacco smoke (both inhaled and secondhand smoke), environmental pollutants, pollen, mold, pets, cleaning agents, room deodorizers, and chemical fumes. Over-the-counter antihistamines or decongestants may be helpful for allergies. A sinus infection may requires antibiotic treatment. See your healthcare provider if symptoms continue.  Medicines  Certain prescribed medicines can cause a chronic cough in some people:  · ACE inhibitors for high blood pressure. These include benazepril, captopril, enalapril, fosinopril, lisinopril, quinapril, ramipril, and others.  · Beta-blockers for high blood pressure and other conditions. These include propranolol, atenolol, metoprolol, nadolol, and others.  Let your healthcare provider know if you are taking any of these.  Asthma  Cough may be the only sign of mild asthma. You may have tests to find out if asthma is causing your cough. You may also take asthma medicine on a trial basis.  Acid reflux (heartburn, GERD)  The esophagus is the tube that carries food from the mouth to the stomach. A valve at its lower end prevents stomach acids from flowing upward. If this valve does not work properly, acid from the stomach enters the esophagus. This may cause a burning pain in the upper abdomen or lower chest, belching, or cough. Symptoms are often worse when lying flat. Avoid eating or drinking before bedtime. Try using extra pillows to raise your upper body, or place 4-inch blocks under the head of your bed. You may try an over-the-counter antacid or an acid-blocking medicine such as famotidine, cimetidine, ranitidine, esomeprazole, lansoprazole, or omeprazole. Stronger medicines for this condition can be prescribed by your healthcare provider.  Follow-up care  Follow up with your healthcare provider, or as advised, if your cough does not improve. Further testing may be  needed.  Note: If an X-ray was taken, a specialist will review it. You will be notified of any new findings that may affect your care.  When to seek medical advice  Call your healthcare provider right away if any of these occur:  · Mild wheezing or difficulty breathing  · Fever of 100.4ºF (38ºC) or higher, or as directed by your healthcare provider  · Unexpected weight loss  · Coughing up large amounts of colored sputum  · Night sweats (sheets and pajamas get soaking wet)  Call 911, or get immediate medical care  Contact emergency services right away if any of these occur:  · Coughing up blood  · Moderate to severe trouble breathing or wheezing  Date Last Reviewed: 9/13/2015 © 2000-2017 [employer]. [address] [place] [address], [place] [address]. All rights reserved. This information is not intended as a substitute for professional medical care. Always follow your healthcare professional's instructions.        Self-Care for Sore Throats    Sore throats happen for many reasons, such as colds, allergies, and infections caused by viruses or bacteria. In any case, your throat becomes red and sore. Your goal for self-care is to reduce your discomfort while giving your throat a chance to heal.  Moisten and soothe your throat  Tips include the following:  · Try a sip of water first thing after waking up.  · Keep your throat moist by drinking 6 or more glasses of clear liquids every day.  · Run a cool-air humidifier in your room overnight.  · Avoid cigarette smoke.   · Suck on throat lozenges, cough drops, hard candy, ice chips, or frozen fruit-juice bars. Use the sugar-free versions if your diet or medical condition requires them.  Gargle to ease irritation  Gargling every hour or 2 can ease irritation. Try gargling with 1 of these solutions:  · 1/4 teaspoon of salt in 1/2 cup of warm water  · An over-the-counter anesthetic gargle  Use medicine for more relief  Over-the-counter medicine can reduce sore throat  symptoms. Ask your pharmacist if you have questions about which medicine to use:  · Ease pain with anesthetic sprays. Aspirin or an aspirin substitute also helps. Remember, never give aspirin to anyone 18 or younger, or if you are already taking blood thinners.   · For sore throats caused by allergies, try antihistamines to block the allergic reaction.  · Remember: unless a sore throat is caused by a bacterial infection, antibiotics wont help you.  Prevent future sore throats  Prevention tips include the following:  · Stop smoking or reduce contact with secondhand smoke. Smoke irritates the tender throat lining.  · Limit contact with pets and with allergy-causing substances, such as pollen and mold.  · When youre around someone with a sore throat or cold, wash your hands often to keep viruses or bacteria from spreading.  · Dont strain your vocal cords.  Call your healthcare provider  Contact your healthcare provider if you have:  · A temperature over 101°F (38.3°C)  · White spots on the throat  · Great difficulty swallowing  · Trouble breathing  · A skin rash  · Recent exposure to someone else with strep bacteria  · Severe hoarseness and swollen glands in the neck or jaw   Date Last Reviewed: 8/1/2016  © 4723-1862 [employer]. [address] [place] [address], [place] [address]. All rights reserved. This information is not intended as a substitute for professional medical care. Always follow your healthcare professional's instructions.

## 2019-07-18 NOTE — PROGRESS NOTES
"Subjective:       Patient ID: Esme Louise is a 49 y.o. female.    Vitals:  height is 5' 6" (1.676 m) and weight is 74.4 kg (164 lb). Her temperature is 98.2 °F (36.8 °C). Her blood pressure is 152/79 (abnormal) and her pulse is 85. Her respiration is 15 and oxygen saturation is 98%.     Chief Complaint: Sore Throat    Cough for 4 weeks.  Was seen by her PCP on 06/27/2019 and prescribed him azithromycin.  States that the cough resolved a little bit but has come back and is getting worse.  Denies any fever chills nausea vomiting diarrhea.  Patient does have a history of RA but states she has not receive steroids in over a year.    Sore Throat    This is a new problem. The current episode started yesterday. The problem has been gradually worsening. There has been no fever. The pain is at a severity of 3/10. The pain is mild. Associated symptoms include congestion, coughing and trouble swallowing. Pertinent negatives include no ear pain, shortness of breath, stridor or vomiting. Treatments tried: mucinex DM. The treatment provided no relief.       Constitution: Positive for fatigue. Negative for chills, sweating and fever.   HENT: Positive for congestion, postnasal drip, sore throat and trouble swallowing. Negative for ear pain, sinus pain, sinus pressure and voice change.    Neck: Negative for painful lymph nodes.   Eyes: Negative for eye redness.   Respiratory: Positive for cough. Negative for chest tightness, sputum production, bloody sputum, COPD, shortness of breath, stridor, wheezing and asthma.    Gastrointestinal: Negative for nausea and vomiting.   Musculoskeletal: Negative for muscle ache.   Skin: Negative for rash.   Allergic/Immunologic: Negative for seasonal allergies and asthma.   Hematologic/Lymphatic: Negative for swollen lymph nodes.       Objective:      Physical Exam   Constitutional: She is oriented to person, place, and time. She appears well-developed and well-nourished. She is cooperative.  " Non-toxic appearance. She does not appear ill. No distress.   Appears run down in clinic, hypertensive in clinic.   HENT:   Head: Normocephalic and atraumatic.   Right Ear: Hearing, tympanic membrane, external ear and ear canal normal.   Left Ear: Hearing, tympanic membrane, external ear and ear canal normal.   Nose: Mucosal edema present. No rhinorrhea or nasal deformity. No epistaxis. Right sinus exhibits no maxillary sinus tenderness and no frontal sinus tenderness. Left sinus exhibits no maxillary sinus tenderness and no frontal sinus tenderness.   Mouth/Throat: Uvula is midline and mucous membranes are normal. No trismus in the jaw. Normal dentition. No uvula swelling. Posterior oropharyngeal erythema (Purulent postnasal drainage) present.   Eyes: Pupils are equal, round, and reactive to light. Conjunctivae, EOM and lids are normal. No scleral icterus.   Sclera clear bilat   Neck: Trachea normal, normal range of motion, full passive range of motion without pain and phonation normal. Neck supple. No spinous process tenderness and no muscular tenderness present. No neck rigidity. Normal range of motion present.   Cardiovascular: Normal rate, regular rhythm, normal heart sounds and normal pulses.   Pulmonary/Chest: Effort normal and breath sounds normal. No stridor. No respiratory distress. She has no wheezes.   Persistent dry cough present throughout exam   Abdominal: Normal appearance.   Musculoskeletal: Normal range of motion. She exhibits no edema or deformity.   Lymphadenopathy:     She has no cervical adenopathy.   Neurological: She is alert and oriented to person, place, and time. She exhibits normal muscle tone. Coordination normal.   Skin: Skin is warm, dry and intact. Capillary refill takes less than 2 seconds. She is not diaphoretic. No pallor.   Psychiatric: She has a normal mood and affect. Her speech is normal and behavior is normal. Judgment and thought content normal. Cognition and memory are  normal.   Nursing note and vitals reviewed.      Results for orders placed or performed in visit on 07/18/19   POCT rapid strep A   Result Value Ref Range    Rapid Strep A Screen Negative Negative     Acceptable Yes      Assessment:       1. Cough present for greater than 3 weeks    2. Post-nasal drip    3. Sore throat    4. Hypertension, unspecified type        Plan:         Cough present for greater than 3 weeks  -     promethazine-codeine 6.25-10 mg/5 ml (PHENERGAN WITH CODEINE) 6.25-10 mg/5 mL syrup; Take 5 mLs by mouth every 6 (six) hours as needed for Cough.  Dispense: 240 mL; Refill: 0  -     fluticasone propionate (FLONASE) 50 mcg/actuation nasal spray; 2 sprays (100 mcg total) by Each Nare route 2 (two) times daily.  Dispense: 15.8 mL; Refill: 0  -     predniSONE (DELTASONE) 20 MG tablet; Take 2 tablets (40 mg total) by mouth once daily. for 5 days  Dispense: 10 tablet; Refill: 0    Post-nasal drip  -     fluticasone propionate (FLONASE) 50 mcg/actuation nasal spray; 2 sprays (100 mcg total) by Each Nare route 2 (two) times daily.  Dispense: 15.8 mL; Refill: 0  -     predniSONE (DELTASONE) 20 MG tablet; Take 2 tablets (40 mg total) by mouth once daily. for 5 days  Dispense: 10 tablet; Refill: 0    Sore throat  -     POCT rapid strep A  -     fluticasone propionate (FLONASE) 50 mcg/actuation nasal spray; 2 sprays (100 mcg total) by Each Nare route 2 (two) times daily.  Dispense: 15.8 mL; Refill: 0  -     predniSONE (DELTASONE) 20 MG tablet; Take 2 tablets (40 mg total) by mouth once daily. for 5 days  Dispense: 10 tablet; Refill: 0    Hypertension, unspecified type      Patient Instructions       If your condition worsens or fails to improve we recommend that you receive another evaluation at the emergency room immediately or contact your primary medical clinic to discuss your concerns.   You must understand that you have received an Urgent Care treatment only and that you may be released  before all of your medical problems are known or treated. You, the patient, will arrange for follow up care as instructed.   Please return here or go to the Emergency Department for any concerns or worsening of condition.  If you were prescribed antibiotics, please take them to completion.  If you were prescribed a narcotic medication, do not drive or operate heavy equipment or machinery while taking these medications.  Please follow up with your primary care doctor or specialist as needed.    If you  smoke, please stop smoking.    Cough, Chronic, Uncertain Cause (Adult)    Everyone has had a cough as part of the common cold, flu, or bronchitis. This kind of cough occurs along with an achy feeling, low-grade fever, nasal and sinus congestion, and a scratchy or sore throat. This usually gets better in 2 to 3 weeks. A cough that lasts longer than 3 weeks may be due to other causes.  If your cough does not improve over the next 2 weeks, further testing may be needed. Follow up with your healthcare provider as advised. Cough suppressants may be recommended. Based on your exam today, the exact cause of your cough is not certain. Below are some common causes for persistent cough.  Smokers cough  Smokers cough doesnt go away. If you continue to smoke, it only gets worse. The cough is from irritation in the air passages. Talk to your healthcare provider about quitting. Medicines or nicotine-replacement products, like gum or the patch, may make quitting easier.  Postnasal drip  A cough that is worse at night may be due to postnasal drip. Excess mucus in the nose drains from the back of your nose to your throat. This triggers the cough reflex. Postnasal drip may be due to a sinus infection or allergy. Common allergens include dust, tobacco smoke (both inhaled and secondhand smoke), environmental pollutants, pollen, mold, pets, cleaning agents, room deodorizers, and chemical fumes. Over-the-counter antihistamines or  decongestants may be helpful for allergies. A sinus infection may requires antibiotic treatment. See your healthcare provider if symptoms continue.  Medicines  Certain prescribed medicines can cause a chronic cough in some people:  · ACE inhibitors for high blood pressure. These include benazepril, captopril, enalapril, fosinopril, lisinopril, quinapril, ramipril, and others.  · Beta-blockers for high blood pressure and other conditions. These include propranolol, atenolol, metoprolol, nadolol, and others.  Let your healthcare provider know if you are taking any of these.  Asthma  Cough may be the only sign of mild asthma. You may have tests to find out if asthma is causing your cough. You may also take asthma medicine on a trial basis.  Acid reflux (heartburn, GERD)  The esophagus is the tube that carries food from the mouth to the stomach. A valve at its lower end prevents stomach acids from flowing upward. If this valve does not work properly, acid from the stomach enters the esophagus. This may cause a burning pain in the upper abdomen or lower chest, belching, or cough. Symptoms are often worse when lying flat. Avoid eating or drinking before bedtime. Try using extra pillows to raise your upper body, or place 4-inch blocks under the head of your bed. You may try an over-the-counter antacid or an acid-blocking medicine such as famotidine, cimetidine, ranitidine, esomeprazole, lansoprazole, or omeprazole. Stronger medicines for this condition can be prescribed by your healthcare provider.  Follow-up care  Follow up with your healthcare provider, or as advised, if your cough does not improve. Further testing may be needed.  Note: If an X-ray was taken, a specialist will review it. You will be notified of any new findings that may affect your care.  When to seek medical advice  Call your healthcare provider right away if any of these occur:  · Mild wheezing or difficulty breathing  · Fever of 100.4ºF (38ºC) or  higher, or as directed by your healthcare provider  · Unexpected weight loss  · Coughing up large amounts of colored sputum  · Night sweats (sheets and pajamas get soaking wet)  Call 911, or get immediate medical care  Contact emergency services right away if any of these occur:  · Coughing up blood  · Moderate to severe trouble breathing or wheezing  Date Last Reviewed: 9/13/2015  © 6030-5377 [employer]. [address] [place] [address], [place] [address]. All rights reserved. This information is not intended as a substitute for professional medical care. Always follow your healthcare professional's instructions.        Self-Care for Sore Throats    Sore throats happen for many reasons, such as colds, allergies, and infections caused by viruses or bacteria. In any case, your throat becomes red and sore. Your goal for self-care is to reduce your discomfort while giving your throat a chance to heal.  Moisten and soothe your throat  Tips include the following:  · Try a sip of water first thing after waking up.  · Keep your throat moist by drinking 6 or more glasses of clear liquids every day.  · Run a cool-air humidifier in your room overnight.  · Avoid cigarette smoke.   · Suck on throat lozenges, cough drops, hard candy, ice chips, or frozen fruit-juice bars. Use the sugar-free versions if your diet or medical condition requires them.  Gargle to ease irritation  Gargling every hour or 2 can ease irritation. Try gargling with 1 of these solutions:  · 1/4 teaspoon of salt in 1/2 cup of warm water  · An over-the-counter anesthetic gargle  Use medicine for more relief  Over-the-counter medicine can reduce sore throat symptoms. Ask your pharmacist if you have questions about which medicine to use:  · Ease pain with anesthetic sprays. Aspirin or an aspirin substitute also helps. Remember, never give aspirin to anyone 18 or younger, or if you are already taking blood thinners.   · For sore throats caused by allergies,  try antihistamines to block the allergic reaction.  · Remember: unless a sore throat is caused by a bacterial infection, antibiotics wont help you.  Prevent future sore throats  Prevention tips include the following:  · Stop smoking or reduce contact with secondhand smoke. Smoke irritates the tender throat lining.  · Limit contact with pets and with allergy-causing substances, such as pollen and mold.  · When youre around someone with a sore throat or cold, wash your hands often to keep viruses or bacteria from spreading.  · Dont strain your vocal cords.  Call your healthcare provider  Contact your healthcare provider if you have:  · A temperature over 101°F (38.3°C)  · White spots on the throat  · Great difficulty swallowing  · Trouble breathing  · A skin rash  · Recent exposure to someone else with strep bacteria  · Severe hoarseness and swollen glands in the neck or jaw   Date Last Reviewed: 8/1/2016  © 1548-2955 [employer]. [address] [place] [address], [place] [address]. All rights reserved. This information is not intended as a substitute for professional medical care. Always follow your healthcare professional's instructions.

## 2019-07-22 ENCOUNTER — TELEPHONE (OUTPATIENT)
Dept: PAIN MEDICINE | Facility: CLINIC | Age: 49
End: 2019-07-22

## 2019-07-22 DIAGNOSIS — G43.119 INTRACTABLE MIGRAINE WITH AURA WITHOUT STATUS MIGRAINOSUS: Primary | ICD-10-CM

## 2019-07-22 DIAGNOSIS — G24.3 ISOLATED CERVICAL DYSTONIA: ICD-10-CM

## 2019-07-22 DIAGNOSIS — M62.838 MUSCLE SPASM: ICD-10-CM

## 2019-07-22 NOTE — TELEPHONE ENCOUNTER
----- Message from Mari Thomas sent at 7/22/2019  1:30 PM CDT -----  Contact: RUBEN MERA   Name of Who is Calling: RUBEN MERA       What is the request in detail: Patient is requesting a call back to Manuel her Botox injection       Can the clinic reply by [facility]: no      What Number to Call Back if not in [facility]: [phone]

## 2019-07-29 ENCOUNTER — TELEPHONE (OUTPATIENT)
Dept: PAIN MEDICINE | Facility: CLINIC | Age: 49
End: 2019-07-29

## 2019-07-29 NOTE — TELEPHONE ENCOUNTER
My name is Staff, I am contacting you from [facility] regarding your appointment scheduled for 07.30.19, with Provider, just confirming you will be able to make it.    If you feel you need to reschedule or canceled please give our office a call so we can better assist you.      Staff requesting patient to arrive 15 mins ahead of schedule appointment time.    Staff left a voicemail reminding pt of their appt

## 2019-07-30 ENCOUNTER — OFFICE VISIT (OUTPATIENT)
Dept: PAIN MEDICINE | Facility: CLINIC | Age: 49
End: 2019-07-30
Attending: ANESTHESIOLOGY
Payer: COMMERCIAL

## 2019-07-30 VITALS
DIASTOLIC BLOOD PRESSURE: 85 MMHG | WEIGHT: 162.5 LBS | HEART RATE: 89 BPM | HEIGHT: 66 IN | BODY MASS INDEX: 26.12 KG/M2 | TEMPERATURE: 99 F | SYSTOLIC BLOOD PRESSURE: 125 MMHG

## 2019-07-30 DIAGNOSIS — G43.119 INTRACTABLE MIGRAINE WITH AURA WITHOUT STATUS MIGRAINOSUS: Primary | ICD-10-CM

## 2019-07-30 DIAGNOSIS — M62.838 MUSCLE SPASM: ICD-10-CM

## 2019-07-30 DIAGNOSIS — G24.3 ISOLATED CERVICAL DYSTONIA: ICD-10-CM

## 2019-07-30 PROCEDURE — 64612 DESTROY NERVE FACE MUSCLE: CPT | Mod: S$GLB,,, | Performed by: ANESTHESIOLOGY

## 2019-07-30 PROCEDURE — 99999 PR PBB SHADOW E&M-EST. PATIENT-LVL III: CPT | Mod: PBBFAC,,, | Performed by: ANESTHESIOLOGY

## 2019-07-30 PROCEDURE — 64616 CHEMODENERV MUSC NECK DYSTON: CPT | Mod: 50,S$GLB,, | Performed by: ANESTHESIOLOGY

## 2019-07-30 PROCEDURE — 99499 NO LOS: ICD-10-PCS | Mod: S$GLB,,, | Performed by: ANESTHESIOLOGY

## 2019-07-30 PROCEDURE — 99999 PR PBB SHADOW E&M-EST. PATIENT-LVL III: ICD-10-PCS | Mod: PBBFAC,,, | Performed by: ANESTHESIOLOGY

## 2019-07-30 PROCEDURE — 99499 UNLISTED E&M SERVICE: CPT | Mod: S$GLB,,, | Performed by: ANESTHESIOLOGY

## 2019-07-30 PROCEDURE — 64612 PR DEST,NERVE,FACIAL: ICD-10-PCS | Mod: S$GLB,,, | Performed by: ANESTHESIOLOGY

## 2019-07-30 PROCEDURE — 64616 PR CHEMODENERVATION NECK MUSCLES EXC LARNYNX, UNI: ICD-10-PCS | Mod: 50,S$GLB,, | Performed by: ANESTHESIOLOGY

## 2019-07-30 RX ORDER — DOXYCYCLINE HYCLATE 100 MG
TABLET ORAL
COMMUNITY
Start: 2019-07-23 | End: 2019-11-19

## 2019-07-30 NOTE — PROGRESS NOTES
Subjective:      Patient ID: Esme Louise is a 49 y.o. female.    Chief Complaint: Neck Pain and Headache    Referred by: Mai Akbar MD     Pain Scales  Best: 0/10  Worst: 8/10  Usually: 2/10  Today: 1/10    Neck Pain    Associated symptoms include headaches.   Headache    Associated symptoms include neck pain.     Patient returns for Botox treatment. She was last seen 3/19/19 when she received Botox 150 units migraine protocol. She reports last injections were very helpful lasting even longer than previous botox injections, with gradual return of previous symptomatology now returning. She denies side effect from the botox injections. In the interim she developed bronchitis starting about 6 weeks ago with a lot of coughing and is currently being treated with doxycycline, noting improvement in symptoms.     Interventional Pain History  RFA C2, C3, TON R 3/27/14, L 3/20/14    Past Medical History:   Diagnosis Date    Anemia     Anxiety     Arthritis     Arthritis     Breast disorder     Depression     Endometriosis     Headaches, cluster     Hypertension     Mental disorder     Occipital neuralgia     Premature ovarian failure     Thyroid disease        Past Surgical History:   Procedure Laterality Date    BLOCK, NERVE, FACET JOINT, CERVICAL, MEDIAL BRANCH Bilateral 2/24/2014    Performed by [facility] at [facility]    BREAST LUMPECTOMY      BREAST LUMPECTOMY      ENDOMETRIAL ABLATION      OCCIPITAL NERVE STIMULATOR INSERTION      OOPHORECTOMY      ovary removed      RADIOFREQUENCY THERMAL COAGULATION, NERVE, SPINAL, CERVICAL, POSTERIOR RAMUS, MEDIAL BRANCH Left 3/27/2014    Performed by [facility] at [facility]    RADIOFREQUENCY THERMAL COAGULATION, NERVE, SPINAL, CERVICAL, POSTERIOR RAMUS, MEDIAL BRANCH Right 3/20/2014    Performed by Mai Akbar MD at [facility]       Review of patient's allergies indicates:   Allergen Reactions    Pcn [penicillins] Swelling    Zoloft  [sertraline] Hives and Swelling       Current Outpatient Medications   Medication Sig Dispense Refill    abatacept, with maltose, (ORENCIA) 250 mg injection Inject 500 mg into the skin.      butalbital-acetaminophen-caff -40 mg Cap TK ONE C PO Q 4 HOURS  0    clonazePAM (KLONOPIN) 0.5 MG tablet TK 1 T PO BID PRN  5    cyclobenzaprine (AMRIX) 15 MG 24 hr capsule TAKE 1 CAPSULE BY MOUTH DAILY AT 6 IN THE EVENING compounded especially for you by the pharmacist  3    diclofenac (VOLTAREN) 50 MG EC tablet TK 1 T PO BID  0    doxycycline (VIBRA-TABS) 100 MG tablet       fenofibrate 160 MG Tab       fluoxetine (PROZAC) 20 MG tablet Take 20 mg by mouth once daily.      fluticasone propionate (FLONASE) 50 mcg/actuation nasal spray 2 sprays (100 mcg total) by Each Nare route 2 (two) times daily. 15.8 mL 0    levothyroxine (SYNTHROID) 100 MCG tablet Take 100 mcg by mouth once daily.      losartan (COZAAR) 50 MG tablet       LYRICA 100 mg capsule TK ONE C PO QHS  2    NUVIGIL 250 mg tablet TK 1 T PO QD  5    ondansetron (ZOFRAN-ODT) 8 MG TbDL       pravastatin (PRAVACHOL) 40 MG tablet       sumatriptan (IMITREX STATDOSE) 6 mg/0.5 mL kit INJECT 6MG UNDER SKIN ONCE. MAY REPEAT IN 1 HOUR IF NEEDED  2    tizanidine 2 mg Cap Take 2 mg by mouth as needed.      traMADol (ULTRAM) 50 mg tablet       zolmitriptan (ZOMIG) 5 MG tablet TK 1 T PO  ONCE PRN HA  3     No current facility-administered medications for this visit.        Family History   Problem Relation Age of Onset    Autoimmune disease Maternal Uncle     Diabetes Maternal Grandmother     Miscarriages / Stillbirths Maternal Grandmother     Colon cancer Maternal Grandfather     Breast cancer Neg Hx     Eclampsia Neg Hx     Hypertension Neg Hx     Ovarian cancer Neg Hx        Social History     Socioeconomic History    Marital status:      Spouse name: Not on file    Number of children: Not on file    Years of education: Not on file     "Highest education level: Not on file   Occupational History    Not on file   Social Needs    Financial resource strain: Not on file    Food insecurity:     Worry: Not on file     Inability: Not on file    Transportation needs:     Medical: Not on file     Non-medical: Not on file   Tobacco Use    Smoking status: Former Smoker   Substance and Sexual Activity    Alcohol use: No    Drug use: No    Sexual activity: Never     Partners: Female     Birth control/protection: None   Lifestyle    Physical activity:     Days per week: Not on file     Minutes per session: Not on file    Stress: Not on file   Relationships    Social connections:     Talks on phone: Not on file     Gets together: Not on file     Attends Yazidi service: Not on file     Active member of club or organization: Not on file     Attends meetings of clubs or organizations: Not on file     Relationship status: Not on file   Other Topics Concern    Not on file   Social History Narrative    Not on file           Review of Systems   Musculoskeletal: Positive for neck pain.   Neurological: Positive for headaches.           Objective:   /85   Pulse 89   Temp 98.6 °F (37 °C)   Ht 5' 6" (1.676 m)   Wt 73.7 kg (162 lb 7.7 oz)   BMI 26.22 kg/m²   Pain Disability Index Review:  Last 3 PDI Scores 7/30/2019 3/19/2019 12/18/2018   Pain Disability Index (PDI) 12 12 14     Normocephalic.  Atraumatic.  Affect appropriate.  Breathing unlabored.  Extra ocular muscles intact.       Ortho/SPM Exam      Assessment:       Encounter Diagnoses   Name Primary?    Intractable migraine with aura without status migrainosus Yes    Muscle spasm     Isolated cervical dystonia          Plan:   We discussed with the patient the assessment and recommendations. The following is the plan we agreed on:    1.  Procedure:  Under clean technique, EMG guidance after discussing with the patient we used 200 units of Botox.  Total of 150 units were injected. We had " waste of 50 units.  We injected the procerus in the midline.  Injected bilateral , frontalis, temporalis, splenius capitis, cervical paraspinals, upper trapezius and levator scapulae.  The patient tolerated procedure well.  2.  Return as needed.  Otherwise follow-up in 3 months to repeat injections.      Esme was seen today for neck pain and headache.    Diagnoses and all orders for this visit:    Intractable migraine with aura without status migrainosus  -     Medication Pre-Authorization  -     onabotulinumtoxina injection 200 Units    Muscle spasm  -     Medication Pre-Authorization  -     onabotulinumtoxina injection 200 Units    Isolated cervical dystonia  -     Medication Pre-Authorization  -     onabotulinumtoxina injection 200 Units    STEFANIE Ray MD  [facility]    I have personally taken the history and examined this patient and agree with the fellow's note as stated above.

## 2019-11-05 ENCOUNTER — TELEPHONE (OUTPATIENT)
Dept: PAIN MEDICINE | Facility: CLINIC | Age: 49
End: 2019-11-05

## 2019-11-05 NOTE — TELEPHONE ENCOUNTER
----- Message from Marcia Hernandez sent at 11/5/2019  2:44 PM CST -----  Contact: pt    Name of Who is Calling:RUBEN MERA [[id number]]    What is the request in detail: Patient would like to reschedule Botox appointment Please contact to further discuss and advise    Can the clinic reply by [facility]: no    [facility]: [phone]

## 2019-11-05 NOTE — TELEPHONE ENCOUNTER
Staff contacted and spoke with patient in regards to rescheduling botox appt with provider Dr. Akbar.    Staff went ahead with pt permission to be schedule with provider on 11/19/19 for 10:15a    Pt verbalized understanding and has accepted the appt
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
## 2019-11-19 ENCOUNTER — OFFICE VISIT (OUTPATIENT)
Dept: PAIN MEDICINE | Facility: CLINIC | Age: 49
End: 2019-11-19
Attending: ANESTHESIOLOGY
Payer: COMMERCIAL

## 2019-11-19 ENCOUNTER — OFFICE VISIT (OUTPATIENT)
Dept: INTERNAL MEDICINE | Facility: CLINIC | Age: 49
End: 2019-11-19
Payer: COMMERCIAL

## 2019-11-19 ENCOUNTER — LAB VISIT (OUTPATIENT)
Dept: LAB | Facility: OTHER | Age: 49
End: 2019-11-19
Attending: FAMILY MEDICINE
Payer: COMMERCIAL

## 2019-11-19 VITALS
BODY MASS INDEX: 26.08 KG/M2 | HEART RATE: 86 BPM | DIASTOLIC BLOOD PRESSURE: 90 MMHG | HEIGHT: 66 IN | OXYGEN SATURATION: 98 % | SYSTOLIC BLOOD PRESSURE: 130 MMHG | WEIGHT: 162.25 LBS

## 2019-11-19 VITALS — RESPIRATION RATE: 18 BRPM | WEIGHT: 160 LBS | HEIGHT: 66 IN | BODY MASS INDEX: 25.71 KG/M2

## 2019-11-19 DIAGNOSIS — E55.9 VITAMIN D DEFICIENCY: ICD-10-CM

## 2019-11-19 DIAGNOSIS — I10 ESSENTIAL HYPERTENSION: ICD-10-CM

## 2019-11-19 DIAGNOSIS — K21.9 GASTROESOPHAGEAL REFLUX DISEASE, ESOPHAGITIS PRESENCE NOT SPECIFIED: ICD-10-CM

## 2019-11-19 DIAGNOSIS — M06.9 RHEUMATOID ARTHRITIS, INVOLVING UNSPECIFIED SITE, UNSPECIFIED RHEUMATOID FACTOR PRESENCE: ICD-10-CM

## 2019-11-19 DIAGNOSIS — G43.119 INTRACTABLE MIGRAINE WITH AURA WITHOUT STATUS MIGRAINOSUS: Primary | ICD-10-CM

## 2019-11-19 DIAGNOSIS — Z12.31 ENCOUNTER FOR SCREENING MAMMOGRAM FOR BREAST CANCER: ICD-10-CM

## 2019-11-19 DIAGNOSIS — M54.81 OCCIPITAL NEURALGIA, UNSPECIFIED LATERALITY: ICD-10-CM

## 2019-11-19 DIAGNOSIS — Z76.89 ENCOUNTER TO ESTABLISH CARE WITH NEW DOCTOR: Primary | ICD-10-CM

## 2019-11-19 DIAGNOSIS — G24.3 ISOLATED CERVICAL DYSTONIA: ICD-10-CM

## 2019-11-19 DIAGNOSIS — E03.9 HYPOTHYROIDISM, UNSPECIFIED TYPE: ICD-10-CM

## 2019-11-19 DIAGNOSIS — E78.5 HYPERLIPIDEMIA, UNSPECIFIED HYPERLIPIDEMIA TYPE: ICD-10-CM

## 2019-11-19 DIAGNOSIS — G43.909 MIGRAINE WITHOUT STATUS MIGRAINOSUS, NOT INTRACTABLE, UNSPECIFIED MIGRAINE TYPE: ICD-10-CM

## 2019-11-19 DIAGNOSIS — M62.838 MUSCLE SPASM: ICD-10-CM

## 2019-11-19 LAB
25(OH)D3+25(OH)D2 SERPL-MCNC: 10 NG/ML (ref 30–96)
ALBUMIN SERPL BCP-MCNC: 3.9 G/DL (ref 3.5–5.2)
ALP SERPL-CCNC: 54 U/L (ref 55–135)
ALT SERPL W/O P-5'-P-CCNC: 40 U/L (ref 10–44)
ANION GAP SERPL CALC-SCNC: 8 MMOL/L (ref 8–16)
AST SERPL-CCNC: 30 U/L (ref 10–40)
BASOPHILS # BLD AUTO: 0.09 K/UL (ref 0–0.2)
BASOPHILS NFR BLD: 1.4 % (ref 0–1.9)
BILIRUB SERPL-MCNC: 0.2 MG/DL (ref 0.1–1)
BUN SERPL-MCNC: 15 MG/DL (ref 6–20)
CALCIUM SERPL-MCNC: 9.6 MG/DL (ref 8.7–10.5)
CHLORIDE SERPL-SCNC: 108 MMOL/L (ref 95–110)
CHOLEST SERPL-MCNC: 144 MG/DL (ref 120–199)
CHOLEST/HDLC SERPL: 2.9 {RATIO} (ref 2–5)
CO2 SERPL-SCNC: 25 MMOL/L (ref 23–29)
CREAT SERPL-MCNC: 0.9 MG/DL (ref 0.5–1.4)
DIFFERENTIAL METHOD: ABNORMAL
EOSINOPHIL # BLD AUTO: 0.5 K/UL (ref 0–0.5)
EOSINOPHIL NFR BLD: 7.9 % (ref 0–8)
ERYTHROCYTE [DISTWIDTH] IN BLOOD BY AUTOMATED COUNT: 13.4 % (ref 11.5–14.5)
EST. GFR  (AFRICAN AMERICAN): >60 ML/MIN/1.73 M^2
EST. GFR  (NON AFRICAN AMERICAN): >60 ML/MIN/1.73 M^2
GLUCOSE SERPL-MCNC: 102 MG/DL (ref 70–110)
HCT VFR BLD AUTO: 38.9 % (ref 37–48.5)
HDLC SERPL-MCNC: 50 MG/DL (ref 40–75)
HDLC SERPL: 34.7 % (ref 20–50)
HGB BLD-MCNC: 11.9 G/DL (ref 12–16)
IMM GRANULOCYTES # BLD AUTO: 0.03 K/UL (ref 0–0.04)
IMM GRANULOCYTES NFR BLD AUTO: 0.5 % (ref 0–0.5)
LDLC SERPL CALC-MCNC: 74.4 MG/DL (ref 63–159)
LYMPHOCYTES # BLD AUTO: 2.2 K/UL (ref 1–4.8)
LYMPHOCYTES NFR BLD: 34.3 % (ref 18–48)
MCH RBC QN AUTO: 27.4 PG (ref 27–31)
MCHC RBC AUTO-ENTMCNC: 30.6 G/DL (ref 32–36)
MCV RBC AUTO: 90 FL (ref 82–98)
MONOCYTES # BLD AUTO: 0.6 K/UL (ref 0.3–1)
MONOCYTES NFR BLD: 9.3 % (ref 4–15)
NEUTROPHILS # BLD AUTO: 3 K/UL (ref 1.8–7.7)
NEUTROPHILS NFR BLD: 46.6 % (ref 38–73)
NONHDLC SERPL-MCNC: 94 MG/DL
NRBC BLD-RTO: 0 /100 WBC
PLATELET # BLD AUTO: 440 K/UL (ref 150–350)
PMV BLD AUTO: 10 FL (ref 9.2–12.9)
POTASSIUM SERPL-SCNC: 4.4 MMOL/L (ref 3.5–5.1)
PROT SERPL-MCNC: 7 G/DL (ref 6–8.4)
RBC # BLD AUTO: 4.34 M/UL (ref 4–5.4)
SODIUM SERPL-SCNC: 141 MMOL/L (ref 136–145)
TRIGL SERPL-MCNC: 98 MG/DL (ref 30–150)
TSH SERPL DL<=0.005 MIU/L-ACNC: 0.62 UIU/ML (ref 0.4–4)
WBC # BLD AUTO: 6.48 K/UL (ref 3.9–12.7)

## 2019-11-19 PROCEDURE — 95874 GUIDE NERV DESTR NEEDLE EMG: CPT | Mod: S$GLB,,, | Performed by: ANESTHESIOLOGY

## 2019-11-19 PROCEDURE — 99999 PR PBB SHADOW E&M-EST. PATIENT-LVL III: ICD-10-PCS | Mod: PBBFAC,,, | Performed by: FAMILY MEDICINE

## 2019-11-19 PROCEDURE — 99204 PR OFFICE/OUTPT VISIT, NEW, LEVL IV, 45-59 MIN: ICD-10-PCS | Mod: S$GLB,,, | Performed by: FAMILY MEDICINE

## 2019-11-19 PROCEDURE — 82306 VITAMIN D 25 HYDROXY: CPT

## 2019-11-19 PROCEDURE — 99499 NO LOS: ICD-10-PCS | Mod: S$GLB,,, | Performed by: ANESTHESIOLOGY

## 2019-11-19 PROCEDURE — 99499 UNLISTED E&M SERVICE: CPT | Mod: S$GLB,,, | Performed by: ANESTHESIOLOGY

## 2019-11-19 PROCEDURE — 64616 PR CHEMODENERVATION NECK MUSCLES EXC LARNYNX, UNI: ICD-10-PCS | Mod: 50,S$GLB,, | Performed by: ANESTHESIOLOGY

## 2019-11-19 PROCEDURE — 85025 COMPLETE CBC W/AUTO DIFF WBC: CPT

## 2019-11-19 PROCEDURE — 3008F PR BODY MASS INDEX (BMI) DOCUMENTED: ICD-10-PCS | Mod: CPTII,S$GLB,, | Performed by: FAMILY MEDICINE

## 2019-11-19 PROCEDURE — 84443 ASSAY THYROID STIM HORMONE: CPT

## 2019-11-19 PROCEDURE — 3008F BODY MASS INDEX DOCD: CPT | Mod: CPTII,S$GLB,, | Performed by: FAMILY MEDICINE

## 2019-11-19 PROCEDURE — 64616 CHEMODENERV MUSC NECK DYSTON: CPT | Mod: 50,S$GLB,, | Performed by: ANESTHESIOLOGY

## 2019-11-19 PROCEDURE — 99999 PR PBB SHADOW E&M-EST. PATIENT-LVL III: ICD-10-PCS | Mod: PBBFAC,,, | Performed by: ANESTHESIOLOGY

## 2019-11-19 PROCEDURE — 36415 COLL VENOUS BLD VENIPUNCTURE: CPT

## 2019-11-19 PROCEDURE — 80061 LIPID PANEL: CPT

## 2019-11-19 PROCEDURE — 99999 PR PBB SHADOW E&M-EST. PATIENT-LVL III: CPT | Mod: PBBFAC,,, | Performed by: FAMILY MEDICINE

## 2019-11-19 PROCEDURE — 64612 PR DEST,NERVE,FACIAL: ICD-10-PCS | Mod: S$GLB,,, | Performed by: ANESTHESIOLOGY

## 2019-11-19 PROCEDURE — 95874 PR NEEDLE EMG GUIDANCE FOR CHEMODENERVATION: ICD-10-PCS | Mod: S$GLB,,, | Performed by: ANESTHESIOLOGY

## 2019-11-19 PROCEDURE — 80053 COMPREHEN METABOLIC PANEL: CPT

## 2019-11-19 PROCEDURE — 99999 PR PBB SHADOW E&M-EST. PATIENT-LVL III: CPT | Mod: PBBFAC,,, | Performed by: ANESTHESIOLOGY

## 2019-11-19 PROCEDURE — 99204 OFFICE O/P NEW MOD 45 MIN: CPT | Mod: S$GLB,,, | Performed by: FAMILY MEDICINE

## 2019-11-19 PROCEDURE — 64612 DESTROY NERVE FACE MUSCLE: CPT | Mod: S$GLB,,, | Performed by: ANESTHESIOLOGY

## 2019-11-19 RX ORDER — PANTOPRAZOLE SODIUM 20 MG/1
20 TABLET, DELAYED RELEASE ORAL DAILY
Qty: 30 TABLET | Refills: 5 | Status: SHIPPED | OUTPATIENT
Start: 2019-11-19 | End: 2020-11-18 | Stop reason: SDUPTHER

## 2019-11-19 RX ORDER — ZOLMITRIPTAN 2.5 MG/1
SPRAY, METERED NASAL
COMMUNITY
End: 2020-08-06

## 2019-11-19 RX ORDER — DULOXETIN HYDROCHLORIDE 60 MG/1
CAPSULE, DELAYED RELEASE ORAL
COMMUNITY
Start: 2019-11-14 | End: 2022-04-07

## 2019-11-19 RX ORDER — CYCLOBENZAPRINE HYDROCHLORIDE 15 MG/1
CAPSULE, EXTENDED RELEASE ORAL
Refills: 1 | COMMUNITY
Start: 2019-09-19 | End: 2019-11-19

## 2019-11-19 RX ORDER — OMEPRAZOLE 40 MG/1
CAPSULE, DELAYED RELEASE ORAL
COMMUNITY
End: 2019-11-19

## 2019-11-19 RX ORDER — PROPRANOLOL HYDROCHLORIDE 10 MG/1
TABLET ORAL
COMMUNITY
Start: 2019-11-11 | End: 2023-10-17 | Stop reason: SDUPTHER

## 2019-11-19 RX ORDER — OMEPRAZOLE 40 MG/1
CAPSULE, DELAYED RELEASE ORAL
Status: CANCELLED | OUTPATIENT
Start: 2019-11-19

## 2019-11-19 NOTE — PROGRESS NOTES
Subjective:      Patient ID: Esme Louise is a 49 y.o. female.    Chief Complaint: No chief complaint on file.    Referred by: Mai Akbar MD     Pain Scales  Best: 0/10  Worst: 8/10  Usually: 2/10  Today: 1/10    Neck Pain    Associated symptoms include headaches.   Headache    Associated symptoms include neck pain.     Patient returns for Botox treatment. She was last seen 7/30/19 when she received Botox 150 units migraine protocol. She reports that she got a full 3 months of excellent relief from previous injections; she says that her symptoms have begun to return over the last 3 weeks. She denies side effect from the botox injections. The botox injections have reduced the severity, frequency and duration of her migraines as well as symptoms of muscle spasms in her neck.     Interventional Pain History  RFA C2, C3, TON R 3/27/14, L 3/20/14  Botox Injections (most recent 7/30/19) 150 units of 200 with 50 wasted; has received several over the years for migraines        Past Medical History:   Diagnosis Date    Anemia     Anxiety     Arthritis     Arthritis     Breast disorder     Depression     Endometriosis     Headaches, cluster     Hypertension     Mental disorder     Occipital neuralgia     Premature ovarian failure     Thyroid disease        Past Surgical History:   Procedure Laterality Date    BREAST LUMPECTOMY Right     benign     ENDOMETRIAL ABLATION      endometriosis     OCCIPITAL NERVE STIMULATOR INSERTION  2015    OOPHORECTOMY Right     endometriosis        Review of patient's allergies indicates:   Allergen Reactions    Pcn [penicillins] Swelling    Zoloft [sertraline] Hives and Swelling       Current Outpatient Medications   Medication Sig Dispense Refill    abatacept, with maltose, (ORENCIA) 250 mg injection Inject 500 mg into the skin.      butalbital-acetaminophen-caff -40 mg Cap TK ONE C PO Q 4 HOURS  0    cyclobenzaprine (AMRIX) 15 MG 24 hr capsule TAKE 1 CAPSULE BY  MOUTH DAILY AT 6 IN THE EVENING compounded especially for you by the pharmacist  3    diclofenac (VOLTAREN) 50 MG EC tablet TK 1 T PO BID  0    diphth,pertus,acell,,tetanus (BOOSTRIX TDAP) 2.5-8-5 Lf-mcg-Lf/0.5mL Syrg injection Inject 0.5 mLs into the muscle once. For one dose. for 1 dose 0.5 mL 0    DULoxetine (CYMBALTA) 60 MG capsule       fenofibrate 160 MG Tab       levothyroxine (SYNTHROID) 100 MCG tablet Take 100 mcg by mouth once daily.      losartan (COZAAR) 50 MG tablet       NUVIGIL 250 mg tablet TK 1 T PO QD  5    onabotulinumtoxinA (BOTOX INJ) Inject as directed.      ondansetron (ZOFRAN-ODT) 8 MG TbDL       pantoprazole (PROTONIX) 20 MG tablet Take 1 tablet (20 mg total) by mouth once daily. 30 tablet 5    pravastatin (PRAVACHOL) 40 MG tablet       propranolol (INDERAL) 10 MG tablet       sodium chloride 0.9% SolP 100 mL with abatacept (with maltose) 250 mg SolR abatacept   750MG 1X MONTH      tizanidine 2 mg Cap Take 2 mg by mouth as needed.      ZOLMitriptan (ZOMIG) 2.5 mg Spry Zomig   AS NEEDED      zolmitriptan (ZOMIG) 5 MG tablet TK 1 T PO  ONCE PRN HA  3     No current facility-administered medications for this visit.        Family History   Problem Relation Age of Onset    Autoimmune disease Maternal Uncle     Diabetes Maternal Grandmother     Miscarriages / Stillbirths Maternal Grandmother     Colon cancer Maternal Grandfather 70    Schizophrenia Father     Suicide Father     Breast cancer Neg Hx     Eclampsia Neg Hx     Hypertension Neg Hx     Ovarian cancer Neg Hx        Social History     Socioeconomic History    Marital status:      Spouse name: Not on file    Number of children: Not on file    Years of education: Not on file    Highest education level: Not on file   Occupational History     Comment: search engine optimization    Social Needs    Financial resource strain: Not on file    Food insecurity:     Worry: Not on file     Inability: Not on file  "   Transportation needs:     Medical: Not on file     Non-medical: Not on file   Tobacco Use    Smoking status: Former Smoker     Packs/day: 0.75     Years: 5.00     Pack years: 3.75     Types: Cigarettes     Last attempt to quit: 2006     Years since quittin.8    Smokeless tobacco: Never Used    Tobacco comment: smoke cloves in the past   Substance and Sexual Activity    Alcohol use: Yes     Frequency: Monthly or less     Drinks per session: 1 or 2     Comment: worsen migraines     Drug use: No    Sexual activity: Yes     Partners: Female     Birth control/protection: None   Lifestyle    Physical activity:     Days per week: Not on file     Minutes per session: Not on file    Stress: Not on file   Relationships    Social connections:     Talks on phone: Not on file     Gets together: Not on file     Attends Congregational service: Not on file     Active member of club or organization: Not on file     Attends meetings of clubs or organizations: Not on file     Relationship status: Not on file   Other Topics Concern    Not on file   Social History Narrative    Not on file           Review of Systems   Musculoskeletal: Positive for neck pain.   Neurological: Positive for headaches.           Objective:   Resp 18   Ht 5' 6" (1.676 m)   Wt 72.6 kg (160 lb)   BMI 25.82 kg/m²   Pain Disability Index Review:  Last 3 PDI Scores 2019 2019 3/19/2019   Pain Disability Index (PDI) 4 12 12     Normocephalic.  Atraumatic.  Affect appropriate.  Breathing unlabored.  Extra ocular muscles intact.       Ortho/SPM Exam   Full ROM in cervical spine. No TTP in cervical paraspinals or upper traps. Negative spurling's b/l. Full strength in BUE. Normoreflexic in BUE.       Assessment:       Encounter Diagnoses   Name Primary?    Intractable migraine with aura without status migrainosus Yes    Muscle spasm     Isolated cervical dystonia          Plan:   We discussed with the patient the assessment and " recommendations. The following is the plan we agreed on:    1.  Procedure:  Under clean technique, EMG guidance after discussing with the patient we used 200 units of Botox.  Total of 150 units were injected. We had waste of 50 units.  We injected the procerus in the midline.  Injected bilateral , frontalis, temporalis, splenius capitis, cervical paraspinals, upper trapezius and levator scapulae.  The patient tolerated procedure well.  2.  Return as needed.  Otherwise follow-up in 3 months to repeat injections.      Diagnoses and all orders for this visit:    Intractable migraine with aura without status migrainosus  -     onabotulinumtoxina injection 200 Units    Muscle spasm  -     onabotulinumtoxina injection 200 Units    Isolated cervical dystonia  -     onabotulinumtoxina injection 200 Units         Jersey Sheikh MD  PGY-2 [employer] [facility]    I have personally taken the history and examined this patient and agree with the resident's note as stated above.

## 2019-11-20 ENCOUNTER — IMMUNIZATION (OUTPATIENT)
Dept: PHARMACY | Facility: CLINIC | Age: 49
End: 2019-11-20
Payer: COMMERCIAL

## 2019-11-20 ENCOUNTER — PATIENT MESSAGE (OUTPATIENT)
Dept: INTERNAL MEDICINE | Facility: CLINIC | Age: 49
End: 2019-11-20

## 2019-11-20 DIAGNOSIS — E55.9 VITAMIN D DEFICIENCY: Primary | ICD-10-CM

## 2019-11-20 RX ORDER — CHOLECALCIFEROL (VITAMIN D3) 1250 MCG
1 TABLET ORAL WEEKLY
Qty: 8 TABLET | Refills: 0 | Status: SHIPPED | OUTPATIENT
Start: 2019-11-20 | End: 2021-10-13

## 2019-11-24 PROBLEM — E03.9 HYPOTHYROIDISM: Status: ACTIVE | Noted: 2019-11-24

## 2019-11-24 PROBLEM — G43.909 MIGRAINE WITHOUT STATUS MIGRAINOSUS, NOT INTRACTABLE: Status: ACTIVE | Noted: 2019-11-24

## 2019-11-24 PROBLEM — I10 ESSENTIAL HYPERTENSION: Status: ACTIVE | Noted: 2019-11-24

## 2019-11-24 PROBLEM — M06.9 RHEUMATOID ARTHRITIS: Status: ACTIVE | Noted: 2019-11-24

## 2019-11-24 PROBLEM — E78.5 HYPERLIPIDEMIA: Status: ACTIVE | Noted: 2019-11-24

## 2020-01-29 ENCOUNTER — PATIENT MESSAGE (OUTPATIENT)
Dept: INTERNAL MEDICINE | Facility: CLINIC | Age: 50
End: 2020-01-29

## 2020-01-29 RX ORDER — FENOFIBRATE 160 MG/1
160 TABLET ORAL DAILY
Qty: 90 TABLET | Refills: 4 | Status: SHIPPED | OUTPATIENT
Start: 2020-01-29 | End: 2021-03-30

## 2020-01-29 RX ORDER — PRAVASTATIN SODIUM 40 MG/1
40 TABLET ORAL DAILY
Qty: 90 TABLET | Refills: 4 | Status: SHIPPED | OUTPATIENT
Start: 2020-01-29 | End: 2021-03-30

## 2020-01-30 ENCOUNTER — PATIENT MESSAGE (OUTPATIENT)
Dept: INTERNAL MEDICINE | Facility: CLINIC | Age: 50
End: 2020-01-30

## 2020-01-30 NOTE — TELEPHONE ENCOUNTER
Please thank Ms. Louies for sending that update. Platelets can be elevated due to inflammation and RA can certainly cause inflammation. Also, her hemoglobin appears to be better when rechecked. We can continue to monitor for now.    Thanks!

## 2020-02-17 ENCOUNTER — TELEPHONE (OUTPATIENT)
Dept: PAIN MEDICINE | Facility: CLINIC | Age: 50
End: 2020-02-17

## 2020-02-17 NOTE — TELEPHONE ENCOUNTER
My name is Staff, I am contacting you from [facility] regarding your appointment scheduled for 02.18.2020, with Provider Dr. Akbar, just confirming you will be able to make it.    If you feel you need to reschedule or canceled please give our office a call so we can better assist you.      Staff requesting patient to arrive 15 mins ahead of schedule appointment time.    Staff left a voicemail reminding pt of their schedule appt

## 2020-02-19 ENCOUNTER — OFFICE VISIT (OUTPATIENT)
Dept: PAIN MEDICINE | Facility: CLINIC | Age: 50
End: 2020-02-19
Attending: ANESTHESIOLOGY
Payer: COMMERCIAL

## 2020-02-19 VITALS
TEMPERATURE: 98 F | HEART RATE: 100 BPM | HEIGHT: 66 IN | BODY MASS INDEX: 25.19 KG/M2 | RESPIRATION RATE: 18 BRPM | OXYGEN SATURATION: 100 % | WEIGHT: 156.75 LBS | DIASTOLIC BLOOD PRESSURE: 88 MMHG | SYSTOLIC BLOOD PRESSURE: 141 MMHG

## 2020-02-19 DIAGNOSIS — G43.119 INTRACTABLE MIGRAINE WITH AURA WITHOUT STATUS MIGRAINOSUS: Primary | ICD-10-CM

## 2020-02-19 PROCEDURE — 99999 PR PBB SHADOW E&M-EST. PATIENT-LVL III: ICD-10-PCS | Mod: PBBFAC,,, | Performed by: ANESTHESIOLOGY

## 2020-02-19 PROCEDURE — 99499 NO LOS: ICD-10-PCS | Mod: S$GLB,,, | Performed by: ANESTHESIOLOGY

## 2020-02-19 PROCEDURE — 99999 PR PBB SHADOW E&M-EST. PATIENT-LVL III: CPT | Mod: PBBFAC,,, | Performed by: ANESTHESIOLOGY

## 2020-02-19 PROCEDURE — 64612 PR DEST,NERVE,FACIAL: ICD-10-PCS | Mod: 50,S$GLB,, | Performed by: ANESTHESIOLOGY

## 2020-02-19 PROCEDURE — 99499 UNLISTED E&M SERVICE: CPT | Mod: S$GLB,,, | Performed by: ANESTHESIOLOGY

## 2020-02-19 PROCEDURE — 64612 DESTROY NERVE FACE MUSCLE: CPT | Mod: 50,S$GLB,, | Performed by: ANESTHESIOLOGY

## 2020-02-19 NOTE — PROGRESS NOTES
Subjective:      Patient ID: Esme Louise is a 49 y.o. female.    Chief Complaint: No chief complaint on file.    Referred by: Mai Akbar MD     HPI  She is here for follow-up and for Botox injection.  It worked very well.  She said she had 100% relief with no migraine episodes.  No untoward side effects.  No new symptomatology.      Past Medical History:   Diagnosis Date    Anemia     Anxiety     Arthritis     Arthritis     Breast disorder     Depression     Endometriosis     Headaches, cluster     Hypertension     Mental disorder     Occipital neuralgia     Premature ovarian failure     Thyroid disease        Past Surgical History:   Procedure Laterality Date    BREAST LUMPECTOMY Right     benign     ENDOMETRIAL ABLATION      endometriosis     OCCIPITAL NERVE STIMULATOR INSERTION  2015    OOPHORECTOMY Right     endometriosis        Review of patient's allergies indicates:   Allergen Reactions    Pcn [penicillins] Swelling    Zoloft [sertraline] Hives and Swelling       Current Outpatient Medications   Medication Sig Dispense Refill    abatacept, with maltose, (ORENCIA) 250 mg injection Inject 500 mg into the skin.      butalbital-acetaminophen-caff -40 mg Cap TK ONE C PO Q 4 HOURS  0    cholecalciferol, vitamin D3, 50,000 unit Tab Take 1 tablet by mouth once a week. For 8 weeks 8 tablet 0    cyclobenzaprine (AMRIX) 15 MG 24 hr capsule TAKE 1 CAPSULE BY MOUTH DAILY AT 6 IN THE EVENING compounded especially for you by the pharmacist  3    diclofenac (VOLTAREN) 50 MG EC tablet TK 1 T PO BID  0    DULoxetine (CYMBALTA) 60 MG capsule       fenofibrate 160 MG Tab Take 1 tablet (160 mg total) by mouth once daily. 90 tablet 4    levothyroxine (SYNTHROID) 100 MCG tablet Take 100 mcg by mouth once daily.      losartan (COZAAR) 50 MG tablet       NUVIGIL 250 mg tablet TK 1 T PO QD  5    onabotulinumtoxinA (BOTOX INJ) Inject as directed.      ondansetron (ZOFRAN-ODT) 8 MG TbDL        pantoprazole (PROTONIX) 20 MG tablet Take 1 tablet (20 mg total) by mouth once daily. 30 tablet 5    pravastatin (PRAVACHOL) 40 MG tablet Take 1 tablet (40 mg total) by mouth once daily. 90 tablet 4    propranolol (INDERAL) 10 MG tablet       sodium chloride 0.9% SolP 100 mL with abatacept (with maltose) 250 mg SolR abatacept   750MG 1X MONTH      tizanidine 2 mg Cap Take 2 mg by mouth as needed.      ZOLMitriptan (ZOMIG) 2.5 mg Spry Zomig   AS NEEDED      zolmitriptan (ZOMIG) 5 MG tablet TK 1 T PO  ONCE PRN HA  3     No current facility-administered medications for this visit.        Family History   Problem Relation Age of Onset    Autoimmune disease Maternal Uncle     Diabetes Maternal Grandmother     Miscarriages / Stillbirths Maternal Grandmother     Colon cancer Maternal Grandfather 70    Schizophrenia Father     Suicide Father     Breast cancer Neg Hx     Eclampsia Neg Hx     Hypertension Neg Hx     Ovarian cancer Neg Hx        Social History     Socioeconomic History    Marital status:      Spouse name: Not on file    Number of children: Not on file    Years of education: Not on file    Highest education level: Not on file   Occupational History     Comment: search engine optimization    Social Needs    Financial resource strain: Not on file    Food insecurity:     Worry: Not on file     Inability: Not on file    Transportation needs:     Medical: Not on file     Non-medical: Not on file   Tobacco Use    Smoking status: Former Smoker     Packs/day: 0.75     Years: 5.00     Pack years: 3.75     Types: Cigarettes     Last attempt to quit: 2006     Years since quittin.1    Smokeless tobacco: Never Used    Tobacco comment: smoke cloves in the past   Substance and Sexual Activity    Alcohol use: Yes     Frequency: Monthly or less     Drinks per session: 1 or 2     Comment: worsen migraines     Drug use: No    Sexual activity: Yes     Partners: Female     Birth  "control/protection: None   Lifestyle    Physical activity:     Days per week: Not on file     Minutes per session: Not on file    Stress: Not on file   Relationships    Social connections:     Talks on phone: Not on file     Gets together: Not on file     Attends Hoahaoism service: Not on file     Active member of club or organization: Not on file     Attends meetings of clubs or organizations: Not on file     Relationship status: Not on file   Other Topics Concern    Not on file   Social History Narrative    Not on file           ROS        Objective:   BP (!) 141/88   Pulse 100   Temp 97.8 °F (36.6 °C)   Resp 18   Ht 5' 6" (1.676 m)   Wt 71.1 kg (156 lb 12 oz)   SpO2 100%   BMI 25.30 kg/m²   Pain Disability Index Review:  Last 3 PDI Scores 2/19/2020 11/19/2019 7/30/2019   Pain Disability Index (PDI) 17 4 12     Normocephalic.  Atraumatic.  Affect appropriate.  Breathing unlabored.  Extra ocular muscles intact.           Ortho/SPM Exam      Assessment:       Encounter Diagnosis   Name Primary?    Intractable migraine with aura without status migrainosus Yes         Plan:   We discussed with the patient the assessment and recommendations. The following is the plan we agreed on:  1.  Procedure under clean technique and after discussing with the patient we injected 150 units of Botox in the procerus in the midline.  We injected bilateral , frontalis, temporalis, splenius capitis, cervical paraspinals, upper trapezius and levator scapulae.  She tolerated procedure well. 50 units were  waste  2.  Return as needed.  Otherwise follow-up in 3 months for repeat injection.      Diagnoses and all orders for this visit:    Intractable migraine with aura without status migrainosus  -     onabotulinumtoxina injection 200 Units               "

## 2020-03-05 ENCOUNTER — PATIENT MESSAGE (OUTPATIENT)
Dept: INTERNAL MEDICINE | Facility: CLINIC | Age: 50
End: 2020-03-05

## 2020-03-05 DIAGNOSIS — I10 ESSENTIAL HYPERTENSION: Primary | ICD-10-CM

## 2020-03-05 DIAGNOSIS — E03.9 HYPOTHYROIDISM, UNSPECIFIED TYPE: ICD-10-CM

## 2020-03-06 RX ORDER — LEVOTHYROXINE SODIUM 100 UG/1
100 TABLET ORAL DAILY
Qty: 30 TABLET | Refills: 11 | Status: SHIPPED | OUTPATIENT
Start: 2020-03-06 | End: 2021-03-31

## 2020-03-06 RX ORDER — LOSARTAN POTASSIUM 50 MG/1
50 TABLET ORAL DAILY
Qty: 30 TABLET | Refills: 11 | Status: SHIPPED | OUTPATIENT
Start: 2020-03-06 | End: 2021-03-30

## 2020-03-25 ENCOUNTER — TELEPHONE (OUTPATIENT)
Dept: PAIN MEDICINE | Facility: CLINIC | Age: 50
End: 2020-03-25

## 2020-03-25 NOTE — TELEPHONE ENCOUNTER
Staff spoke with patient in regards to her 5/19/20 botox appt with provider.    Patient opt to postponed appt until further notice.

## 2020-06-15 ENCOUNTER — TELEPHONE (OUTPATIENT)
Dept: PAIN MEDICINE | Facility: CLINIC | Age: 50
End: 2020-06-15

## 2020-06-15 NOTE — TELEPHONE ENCOUNTER
----- Message from Krissy Michael sent at 6/15/2020  9:16 AM CDT -----  Regarding: Appointment  Contact: RUBEN MERA [[id number]]  Type: Patient Call Back    Who called: RUBEN MERA [[id number]]    What is the request in detail: Patient is requesting a call back in regards to rescheduling her Botox appointment.   Please advise.    Can the clinic reply by [facility]? No    Best call back number: [phone]    Additional Information: N/A

## 2020-06-15 NOTE — TELEPHONE ENCOUNTER
Staff returned call to patient in regards to being schedule for canceled Botox appointment with Pain Provider Dr. Mai Akbar.    Staff informed patient that provider has an availability for 6/16/20 at 11:30am    Pt verbalized understanding and has confirmed appointment.

## 2020-06-16 ENCOUNTER — OFFICE VISIT (OUTPATIENT)
Dept: PAIN MEDICINE | Facility: CLINIC | Age: 50
End: 2020-06-16
Attending: ANESTHESIOLOGY
Payer: COMMERCIAL

## 2020-06-16 ENCOUNTER — TELEPHONE (OUTPATIENT)
Dept: PAIN MEDICINE | Facility: CLINIC | Age: 50
End: 2020-06-16

## 2020-06-16 VITALS
TEMPERATURE: 99 F | BODY MASS INDEX: 24.13 KG/M2 | OXYGEN SATURATION: 100 % | WEIGHT: 150.13 LBS | RESPIRATION RATE: 18 BRPM | SYSTOLIC BLOOD PRESSURE: 120 MMHG | DIASTOLIC BLOOD PRESSURE: 78 MMHG | HEART RATE: 80 BPM | HEIGHT: 66 IN

## 2020-06-16 DIAGNOSIS — G43.019 INTRACTABLE MIGRAINE WITHOUT AURA AND WITHOUT STATUS MIGRAINOSUS: Primary | ICD-10-CM

## 2020-06-16 DIAGNOSIS — G43.119 INTRACTABLE MIGRAINE WITH AURA WITHOUT STATUS MIGRAINOSUS: Primary | ICD-10-CM

## 2020-06-16 PROCEDURE — 99499 NO LOS: ICD-10-PCS | Mod: S$GLB,,, | Performed by: ANESTHESIOLOGY

## 2020-06-16 PROCEDURE — 99999 PR PBB SHADOW E&M-EST. PATIENT-LVL IV: ICD-10-PCS | Mod: PBBFAC,,, | Performed by: ANESTHESIOLOGY

## 2020-06-16 PROCEDURE — 64612 DESTROY NERVE FACE MUSCLE: CPT | Mod: 50,S$GLB,, | Performed by: ANESTHESIOLOGY

## 2020-06-16 PROCEDURE — 99499 UNLISTED E&M SERVICE: CPT | Mod: S$GLB,,, | Performed by: ANESTHESIOLOGY

## 2020-06-16 PROCEDURE — 99999 PR PBB SHADOW E&M-EST. PATIENT-LVL IV: CPT | Mod: PBBFAC,,, | Performed by: ANESTHESIOLOGY

## 2020-06-16 PROCEDURE — 64612 PR DEST,NERVE,FACIAL: ICD-10-PCS | Mod: 50,S$GLB,, | Performed by: ANESTHESIOLOGY

## 2020-06-16 RX ORDER — AMLODIPINE BESYLATE 2.5 MG/1
2.5 TABLET ORAL DAILY
COMMUNITY
End: 2020-08-06

## 2020-06-16 RX ORDER — METHOTREXATE 2.5 MG/1
TABLET ORAL
COMMUNITY

## 2020-06-16 RX ORDER — FOLIC ACID 1 MG/1
1 TABLET ORAL DAILY
COMMUNITY
End: 2021-06-22

## 2020-06-16 NOTE — PROGRESS NOTES
You Subjective:      Patient ID: Esme Louise is a 50 y.o. female.    Chief Complaint: No chief complaint on file.    Referred by: Mai Akbar MD     HPI  She is here for follow-up and for Botox injection.  No new symptomatology.  She had slight weakness in her lower neck that lasted only 1 month.  Otherwise, injections worked very well.  No new symptomatology.      Past Medical History:   Diagnosis Date    Anemia     Anxiety     Arthritis     Arthritis     Breast disorder     Depression     Endometriosis     Headaches, cluster     Hypertension     Mental disorder     Occipital neuralgia     Premature ovarian failure     Thyroid disease        Past Surgical History:   Procedure Laterality Date    BREAST LUMPECTOMY Right     benign     ENDOMETRIAL ABLATION      endometriosis     OCCIPITAL NERVE STIMULATOR INSERTION  2015    OOPHORECTOMY Right     endometriosis        Review of patient's allergies indicates:   Allergen Reactions    Pcn [penicillins] Swelling    Zoloft [sertraline] Hives and Swelling       Current Outpatient Medications   Medication Sig Dispense Refill    abatacept, with maltose, (ORENCIA) 250 mg injection Inject 500 mg into the skin.      amLODIPine (NORVASC) 2.5 MG tablet Take 2.5 mg by mouth once daily.      butalbital-acetaminophen-caff -40 mg Cap TK ONE C PO Q 4 HOURS  0    cholecalciferol, vitamin D3, 50,000 unit Tab Take 1 tablet by mouth once a week. For 8 weeks 8 tablet 0    cyclobenzaprine (AMRIX) 15 MG 24 hr capsule TAKE 1 CAPSULE BY MOUTH DAILY AT 6 IN THE EVENING compounded especially for you by the pharmacist  3    diclofenac (VOLTAREN) 50 MG EC tablet TK 1 T PO BID  0    DULoxetine (CYMBALTA) 60 MG capsule       fenofibrate 160 MG Tab Take 1 tablet (160 mg total) by mouth once daily. 90 tablet 4    folic acid (FOLVITE) 1 MG tablet Take 1 mg by mouth once daily.      levothyroxine (SYNTHROID) 100 MCG tablet Take 1 tablet (100 mcg total) by mouth once  daily. 30 tablet 11    losartan (COZAAR) 50 MG tablet Take 1 tablet (50 mg total) by mouth once daily. 30 tablet 11    methotrexate 2.5 MG Tab Take by mouth every 7 days.      NUVIGIL 250 mg tablet TK 1 T PO QD  5    onabotulinumtoxinA (BOTOX INJ) Inject as directed.      ondansetron (ZOFRAN-ODT) 8 MG TbDL       pantoprazole (PROTONIX) 20 MG tablet Take 1 tablet (20 mg total) by mouth once daily. 30 tablet 5    pravastatin (PRAVACHOL) 40 MG tablet Take 1 tablet (40 mg total) by mouth once daily. 90 tablet 4    propranolol (INDERAL) 10 MG tablet       sodium chloride 0.9% SolP 100 mL with abatacept (with maltose) 250 mg SolR abatacept   750MG 1X MONTH      tizanidine 2 mg Cap Take 2 mg by mouth as needed.      ZOLMitriptan (ZOMIG) 2.5 mg Spry Zomig   AS NEEDED      zolmitriptan (ZOMIG) 5 MG tablet TK 1 T PO  ONCE PRN HA  3     No current facility-administered medications for this visit.        Family History   Problem Relation Age of Onset    Autoimmune disease Maternal Uncle     Diabetes Maternal Grandmother     Miscarriages / Stillbirths Maternal Grandmother     Colon cancer Maternal Grandfather 70    Schizophrenia Father     Suicide Father     Breast cancer Neg Hx     Eclampsia Neg Hx     Hypertension Neg Hx     Ovarian cancer Neg Hx        Social History     Socioeconomic History    Marital status:      Spouse name: Not on file    Number of children: Not on file    Years of education: Not on file    Highest education level: Not on file   Occupational History     Comment: search engine optimization    Social Needs    Financial resource strain: Not on file    Food insecurity     Worry: Not on file     Inability: Not on file    Transportation needs     Medical: Not on file     Non-medical: Not on file   Tobacco Use    Smoking status: Former Smoker     Packs/day: 0.75     Years: 5.00     Pack years: 3.75     Types: Cigarettes     Quit date: 2006     Years since quittin.4  "   Smokeless tobacco: Never Used    Tobacco comment: smoke cloves in the past   Substance and Sexual Activity    Alcohol use: Yes     Frequency: Monthly or less     Drinks per session: 1 or 2     Comment: worsen migraines     Drug use: No    Sexual activity: Yes     Partners: Female     Birth control/protection: None   Lifestyle    Physical activity     Days per week: Not on file     Minutes per session: Not on file    Stress: Not on file   Relationships    Social connections     Talks on phone: Not on file     Gets together: Not on file     Attends Faith service: Not on file     Active member of club or organization: Not on file     Attends meetings of clubs or organizations: Not on file     Relationship status: Not on file   Other Topics Concern    Not on file   Social History Narrative    Not on file           ROS        Objective:   /78   Pulse 80   Temp 98.5 °F (36.9 °C)   Resp 18   Ht 5' 6" (1.676 m)   Wt 68.1 kg (150 lb 2.1 oz)   SpO2 100%   BMI 24.23 kg/m²   Pain Disability Index Review:  Last 3 PDI Scores 6/16/2020 2/19/2020 11/19/2019   Pain Disability Index (PDI) 21 17 4     Normocephalic.  Atraumatic.  Affect appropriate.  Breathing unlabored.  Extra ocular muscles intact.           Ortho/SPM Exam      Assessment:       Encounter Diagnosis   Name Primary?    Intractable migraine without aura and without status migrainosus Yes         Plan:   We discussed with the patient the assessment and recommendations. The following is the plan we agreed on:  Procedure:  Under clean technique and after discussing with the patient 200 units of Botox were mixed.  We had waste of 50 units.  We injected the procerus in the midline.  We injected bilateral , frontalis, temporalis, splenius capitis, upper trapezius and levator scapulae.  The patient tolerated procedure well.  She was advised to call get a return as needed otherwise we will see her back in 3 months to repeat.  We gave " less volume in neck area today.      Diagnoses and all orders for this visit:    Intractable migraine without aura and without status migrainosus  -     onabotulinumtoxina injection 200 Units

## 2020-06-25 ENCOUNTER — HOSPITAL ENCOUNTER (OUTPATIENT)
Dept: RADIOLOGY | Facility: HOSPITAL | Age: 50
Discharge: HOME | End: 2020-06-25
Attending: FAMILY MEDICINE
Payer: COMMERCIAL

## 2020-06-25 DIAGNOSIS — Z12.31 ENCOUNTER FOR SCREENING MAMMOGRAM FOR BREAST CANCER: ICD-10-CM

## 2020-06-25 PROCEDURE — 77063 BREAST TOMOSYNTHESIS BI: CPT | Mod: 26,,, | Performed by: RADIOLOGY

## 2020-06-25 PROCEDURE — 77063 MAMMO DIGITAL SCREENING BILAT WITH TOMOSYNTHESIS_CAD: ICD-10-PCS | Mod: 26,,, | Performed by: RADIOLOGY

## 2020-06-25 PROCEDURE — 77067 SCR MAMMO BI INCL CAD: CPT | Mod: TC

## 2020-06-25 PROCEDURE — 77067 SCR MAMMO BI INCL CAD: CPT | Mod: 26,,, | Performed by: RADIOLOGY

## 2020-06-25 PROCEDURE — 77067 MAMMO DIGITAL SCREENING BILAT WITH TOMOSYNTHESIS_CAD: ICD-10-PCS | Mod: 26,,, | Performed by: RADIOLOGY

## 2020-08-06 ENCOUNTER — OFFICE VISIT (OUTPATIENT)
Dept: OBSTETRICS AND GYNECOLOGY | Facility: CLINIC | Age: 50
End: 2020-08-06
Attending: OBSTETRICS & GYNECOLOGY
Payer: COMMERCIAL

## 2020-08-06 ENCOUNTER — HOSPITAL ENCOUNTER (OUTPATIENT)
Dept: RADIOLOGY | Facility: OTHER | Age: 50
Discharge: HOME | End: 2020-08-06
Attending: OBSTETRICS & GYNECOLOGY
Payer: COMMERCIAL

## 2020-08-06 VITALS
SYSTOLIC BLOOD PRESSURE: 130 MMHG | HEIGHT: 66 IN | BODY MASS INDEX: 24.48 KG/M2 | DIASTOLIC BLOOD PRESSURE: 90 MMHG | WEIGHT: 152.31 LBS

## 2020-08-06 DIAGNOSIS — Z12.4 ENCOUNTER FOR PAPANICOLAOU SMEAR FOR CERVICAL CANCER SCREENING: ICD-10-CM

## 2020-08-06 DIAGNOSIS — Z78.0 MENOPAUSE: ICD-10-CM

## 2020-08-06 DIAGNOSIS — Z11.51 ENCOUNTER FOR SCREENING FOR HUMAN PAPILLOMAVIRUS (HPV): Primary | ICD-10-CM

## 2020-08-06 PROCEDURE — 77080 DEXA BONE DENSITY SPINE HIP: ICD-10-PCS | Mod: 26,,, | Performed by: RADIOLOGY

## 2020-08-06 PROCEDURE — 3008F BODY MASS INDEX DOCD: CPT | Mod: CPTII,S$GLB,, | Performed by: OBSTETRICS & GYNECOLOGY

## 2020-08-06 PROCEDURE — 3075F PR MOST RECENT SYSTOLIC BLOOD PRESS GE 130-139MM HG: ICD-10-PCS | Mod: CPTII,S$GLB,, | Performed by: OBSTETRICS & GYNECOLOGY

## 2020-08-06 PROCEDURE — 99386 PREV VISIT NEW AGE 40-64: CPT | Mod: S$GLB,,, | Performed by: OBSTETRICS & GYNECOLOGY

## 2020-08-06 PROCEDURE — 99999 PR PBB SHADOW E&M-EST. PATIENT-LVL V: CPT | Mod: PBBFAC,,, | Performed by: OBSTETRICS & GYNECOLOGY

## 2020-08-06 PROCEDURE — 3080F DIAST BP >= 90 MM HG: CPT | Mod: CPTII,S$GLB,, | Performed by: OBSTETRICS & GYNECOLOGY

## 2020-08-06 PROCEDURE — 77080 DXA BONE DENSITY AXIAL: CPT | Mod: TC

## 2020-08-06 PROCEDURE — 99386 PR PREVENTIVE VISIT,NEW,40-64: ICD-10-PCS | Mod: S$GLB,,, | Performed by: OBSTETRICS & GYNECOLOGY

## 2020-08-06 PROCEDURE — 77080 DXA BONE DENSITY AXIAL: CPT | Mod: 26,,, | Performed by: RADIOLOGY

## 2020-08-06 PROCEDURE — 99999 PR PBB SHADOW E&M-EST. PATIENT-LVL V: ICD-10-PCS | Mod: PBBFAC,,, | Performed by: OBSTETRICS & GYNECOLOGY

## 2020-08-06 PROCEDURE — 3008F PR BODY MASS INDEX (BMI) DOCUMENTED: ICD-10-PCS | Mod: CPTII,S$GLB,, | Performed by: OBSTETRICS & GYNECOLOGY

## 2020-08-06 PROCEDURE — 88175 CYTOPATH C/V AUTO FLUID REDO: CPT

## 2020-08-06 PROCEDURE — 87624 HPV HI-RISK TYP POOLED RSLT: CPT

## 2020-08-06 PROCEDURE — 3075F SYST BP GE 130 - 139MM HG: CPT | Mod: CPTII,S$GLB,, | Performed by: OBSTETRICS & GYNECOLOGY

## 2020-08-06 PROCEDURE — 3080F PR MOST RECENT DIASTOLIC BLOOD PRESSURE >= 90 MM HG: ICD-10-PCS | Mod: CPTII,S$GLB,, | Performed by: OBSTETRICS & GYNECOLOGY

## 2020-08-06 RX ORDER — CLONAZEPAM 1 MG/1
TABLET ORAL
COMMUNITY
Start: 2020-07-29

## 2020-08-17 LAB
HPV HR 12 DNA SPEC QL NAA+PROBE: NEGATIVE
HPV16 AG SPEC QL: NEGATIVE
HPV18 DNA SPEC QL NAA+PROBE: NEGATIVE

## 2020-08-21 LAB
FINAL PATHOLOGIC DIAGNOSIS: NORMAL
Lab: NORMAL

## 2020-09-10 ENCOUNTER — TELEPHONE (OUTPATIENT)
Dept: PAIN MEDICINE | Facility: CLINIC | Age: 50
End: 2020-09-10

## 2020-09-10 DIAGNOSIS — G24.3 ISOLATED CERVICAL DYSTONIA: ICD-10-CM

## 2020-09-10 DIAGNOSIS — G43.119 INTRACTABLE MIGRAINE WITH AURA WITHOUT STATUS MIGRAINOSUS: Primary | ICD-10-CM

## 2020-09-16 ENCOUNTER — OFFICE VISIT (OUTPATIENT)
Dept: PAIN MEDICINE | Facility: CLINIC | Age: 50
End: 2020-09-16
Attending: ANESTHESIOLOGY
Payer: COMMERCIAL

## 2020-09-16 VITALS
SYSTOLIC BLOOD PRESSURE: 131 MMHG | HEIGHT: 66 IN | RESPIRATION RATE: 18 BRPM | WEIGHT: 152 LBS | HEART RATE: 87 BPM | TEMPERATURE: 99 F | BODY MASS INDEX: 24.43 KG/M2 | DIASTOLIC BLOOD PRESSURE: 89 MMHG

## 2020-09-16 DIAGNOSIS — G43.119 INTRACTABLE MIGRAINE WITH AURA WITHOUT STATUS MIGRAINOSUS: Primary | ICD-10-CM

## 2020-09-16 PROCEDURE — 99999 PR PBB SHADOW E&M-EST. PATIENT-LVL III: ICD-10-PCS | Mod: PBBFAC,,, | Performed by: ANESTHESIOLOGY

## 2020-09-16 PROCEDURE — 64612 PR DEST,NERVE,FACIAL: ICD-10-PCS | Mod: S$GLB,,, | Performed by: ANESTHESIOLOGY

## 2020-09-16 PROCEDURE — 99499 NO LOS: ICD-10-PCS | Mod: S$GLB,,, | Performed by: ANESTHESIOLOGY

## 2020-09-16 PROCEDURE — 99499 UNLISTED E&M SERVICE: CPT | Mod: S$GLB,,, | Performed by: ANESTHESIOLOGY

## 2020-09-16 PROCEDURE — 64612 DESTROY NERVE FACE MUSCLE: CPT | Mod: S$GLB,,, | Performed by: ANESTHESIOLOGY

## 2020-09-16 PROCEDURE — 99999 PR PBB SHADOW E&M-EST. PATIENT-LVL III: CPT | Mod: PBBFAC,,, | Performed by: ANESTHESIOLOGY

## 2020-09-16 NOTE — PROGRESS NOTES
Subjective:      Patient ID: Esme Louise is a 50 y.o. female.    Chief Complaint: Botulinum Toxin Injection (200 units )    Referred by: Mai Akbar MD     HPI  She is here for follow-up and for Botox injection.  Last time she received 150 units.  She said it worked very well.  No new complaints.  No untoward side effects.      Past Medical History:   Diagnosis Date    Anemia     Anxiety     Arthritis     Arthritis     Breast disorder     Depression     Endometriosis     Headaches, cluster     Hypertension     Mental disorder     Occipital neuralgia     Premature ovarian failure     Thyroid disease        Past Surgical History:   Procedure Laterality Date    BREAST LUMPECTOMY Right     benign     ENDOMETRIAL ABLATION      endometriosis     OCCIPITAL NERVE STIMULATOR INSERTION  2015    OOPHORECTOMY Right     endometriosis        Review of patient's allergies indicates:   Allergen Reactions    Pcn [penicillins] Swelling    Zoloft [sertraline] Hives and Swelling       Current Outpatient Medications   Medication Sig Dispense Refill    butalbital-acetaminophen-caff -40 mg Cap TK ONE C PO Q 4 HOURS  0    cholecalciferol, vitamin D3, 50,000 unit Tab Take 1 tablet by mouth once a week. For 8 weeks 8 tablet 0    clonazePAM (KLONOPIN) 1 MG tablet       cyclobenzaprine (AMRIX) 15 MG 24 hr capsule TAKE 1 CAPSULE BY MOUTH DAILY AT 6 IN THE EVENING compounded especially for you by the pharmacist  3    diclofenac (VOLTAREN) 50 MG EC tablet TK 1 T PO BID  0    DULoxetine (CYMBALTA) 60 MG capsule       fenofibrate 160 MG Tab Take 1 tablet (160 mg total) by mouth once daily. 90 tablet 4    folic acid (FOLVITE) 1 MG tablet Take 1 mg by mouth once daily.      levothyroxine (SYNTHROID) 100 MCG tablet Take 1 tablet (100 mcg total) by mouth once daily. 30 tablet 11    losartan (COZAAR) 50 MG tablet Take 1 tablet (50 mg total) by mouth once daily. 30 tablet 11    methotrexate 2.5 MG Tab Take by mouth  every 7 days.      NUVIGIL 250 mg tablet TK 1 T PO QD  5    onabotulinumtoxinA (BOTOX INJ) Inject as directed.      ondansetron (ZOFRAN-ODT) 8 MG TbDL       pantoprazole (PROTONIX) 20 MG tablet Take 1 tablet (20 mg total) by mouth once daily. 30 tablet 5    pravastatin (PRAVACHOL) 40 MG tablet Take 1 tablet (40 mg total) by mouth once daily. 90 tablet 4    propranolol (INDERAL) 10 MG tablet       sodium chloride 0.9% SolP 100 mL with abatacept (with maltose) 250 mg SolR abatacept   750MG 1X MONTH      tizanidine 2 mg Cap Take 2 mg by mouth as needed.      zolmitriptan (ZOMIG) 5 MG tablet TK 1 T PO  ONCE PRN HA  3     No current facility-administered medications for this visit.        Family History   Problem Relation Age of Onset    Autoimmune disease Maternal Uncle     Diabetes Maternal Grandmother     Miscarriages / Stillbirths Maternal Grandmother     Colon cancer Maternal Grandfather 70    Schizophrenia Father     Suicide Father     Breast cancer Neg Hx     Eclampsia Neg Hx     Hypertension Neg Hx     Ovarian cancer Neg Hx        Social History     Socioeconomic History    Marital status:      Spouse name: Not on file    Number of children: Not on file    Years of education: Not on file    Highest education level: Not on file   Occupational History     Comment: search engine optimization    Social Needs    Financial resource strain: Not on file    Food insecurity     Worry: Not on file     Inability: Not on file    Transportation needs     Medical: Not on file     Non-medical: Not on file   Tobacco Use    Smoking status: Former Smoker     Packs/day: 0.75     Years: 5.00     Pack years: 3.75     Types: Cigarettes     Quit date: 2006     Years since quittin.7    Smokeless tobacco: Never Used    Tobacco comment: smoke cloves in the past   Substance and Sexual Activity    Alcohol use: Yes     Frequency: Monthly or less     Drinks per session: 1 or 2     Comment: worsen  "migraines     Drug use: No    Sexual activity: Yes     Partners: Female     Birth control/protection: None   Lifestyle    Physical activity     Days per week: Not on file     Minutes per session: Not on file    Stress: Not on file   Relationships    Social connections     Talks on phone: Not on file     Gets together: Not on file     Attends [employer] service: Not on file     Active member of club or organization: Not on file     Attends meetings of clubs or organizations: Not on file     Relationship status: Not on file   Other Topics Concern    Not on file   Social History Narrative    Not on file           Review of Systems   Constitution: Negative for chills, fever, malaise/fatigue, weight gain and weight loss.   HENT: Negative for ear pain and hoarse voice.    Eyes: Negative for blurred vision, pain and visual disturbance.   Cardiovascular: Negative for chest pain, dyspnea on exertion and irregular heartbeat.   Respiratory: Negative for cough, shortness of breath and wheezing.    Endocrine: Negative for cold intolerance and heat intolerance.   Hematologic/Lymphatic: Negative for adenopathy and bleeding problem. Does not bruise/bleed easily.   Skin: Negative for color change, itching and rash.   Musculoskeletal: Negative for back pain and neck pain.   Gastrointestinal: Negative for change in bowel habit, diarrhea, hematemesis, hematochezia, melena and vomiting.   Genitourinary: Negative for flank pain, frequency, hematuria and urgency.   Neurological: Negative for difficulty with concentration, dizziness, headaches, loss of balance and seizures.   Psychiatric/Behavioral: Negative for altered mental status, depression and suicidal ideas. The patient is not nervous/anxious.    Allergic/Immunologic: Negative for HIV exposure.           Objective:   /89   Pulse 87   Temp 98.5 °F (36.9 °C) (Oral)   Resp 18   Ht 5' 6" (1.676 m)   Wt 68.9 kg (152 lb)   LMP 03/20/2018 (Approximate)   BMI 24.53 kg/m² "   Pain Disability Index Review:  Last 3 PDI Scores 9/16/2020 6/16/2020 2/19/2020   Pain Disability Index (PDI) 10 21 17     Normocephalic.  Atraumatic.  Affect appropriate.  Breathing unlabored.  Extra ocular muscles intact.           Ortho/SPM Exam      Assessment:       Encounter Diagnosis   Name Primary?    Intractable migraine with aura without status migrainosus Yes         Plan:   We discussed with the patient the assessment and recommendations. The following is the plan we agreed on:  Procedure:  Under clean technique and after discussing with the patient 200 units of Botox were mixed.  50 units were waste.  We injected the procerus in the midline.  We injected bilateral , frontalis, temporalis, splenius capitis and upper trapezius.  The patient tolerated procedure well.  Return as needed otherwise come back in 3 months for repeat injection      Esme was seen today for botulinum toxin injection.    Diagnoses and all orders for this visit:    Intractable migraine with aura without status migrainosus  -     onabotulinumtoxina injection 200 Units

## 2020-09-22 ENCOUNTER — OFFICE VISIT (OUTPATIENT)
Dept: INTERNAL MEDICINE | Facility: CLINIC | Age: 50
End: 2020-09-22
Payer: COMMERCIAL

## 2020-09-22 VITALS
OXYGEN SATURATION: 97 % | HEIGHT: 66 IN | HEART RATE: 87 BPM | WEIGHT: 153.44 LBS | SYSTOLIC BLOOD PRESSURE: 137 MMHG | BODY MASS INDEX: 24.66 KG/M2 | DIASTOLIC BLOOD PRESSURE: 81 MMHG

## 2020-09-22 DIAGNOSIS — E78.2 MIXED HYPERLIPIDEMIA: ICD-10-CM

## 2020-09-22 DIAGNOSIS — Z11.59 NEED FOR HEPATITIS C SCREENING TEST: ICD-10-CM

## 2020-09-22 DIAGNOSIS — I10 ESSENTIAL HYPERTENSION: Primary | ICD-10-CM

## 2020-09-22 DIAGNOSIS — Z11.4 SCREENING FOR HIV (HUMAN IMMUNODEFICIENCY VIRUS): ICD-10-CM

## 2020-09-22 DIAGNOSIS — K21.9 GASTROESOPHAGEAL REFLUX DISEASE, ESOPHAGITIS PRESENCE NOT SPECIFIED: ICD-10-CM

## 2020-09-22 DIAGNOSIS — Z13.1 ENCOUNTER FOR SCREENING FOR DIABETES MELLITUS: ICD-10-CM

## 2020-09-22 DIAGNOSIS — E03.9 HYPOTHYROIDISM, UNSPECIFIED TYPE: ICD-10-CM

## 2020-09-22 DIAGNOSIS — M06.9 RHEUMATOID ARTHRITIS, INVOLVING UNSPECIFIED SITE, UNSPECIFIED RHEUMATOID FACTOR PRESENCE: ICD-10-CM

## 2020-09-22 DIAGNOSIS — Z23 INFLUENZA VACCINE NEEDED: ICD-10-CM

## 2020-09-22 DIAGNOSIS — M85.80 OSTEOPENIA, UNSPECIFIED LOCATION: ICD-10-CM

## 2020-09-22 PROBLEM — F32.A DEPRESSION: Status: ACTIVE | Noted: 2020-09-22

## 2020-09-22 PROBLEM — R53.83 FATIGUE: Status: ACTIVE | Noted: 2020-09-22

## 2020-09-22 PROBLEM — F43.10 PTSD (POST-TRAUMATIC STRESS DISORDER): Status: ACTIVE | Noted: 2020-09-22

## 2020-09-22 PROCEDURE — 3008F PR BODY MASS INDEX (BMI) DOCUMENTED: ICD-10-PCS | Mod: CPTII,S$GLB,, | Performed by: INTERNAL MEDICINE

## 2020-09-22 PROCEDURE — 3008F BODY MASS INDEX DOCD: CPT | Mod: CPTII,S$GLB,, | Performed by: INTERNAL MEDICINE

## 2020-09-22 PROCEDURE — 99999 PR PBB SHADOW E&M-EST. PATIENT-LVL V: ICD-10-PCS | Mod: PBBFAC,,, | Performed by: INTERNAL MEDICINE

## 2020-09-22 PROCEDURE — 3079F PR MOST RECENT DIASTOLIC BLOOD PRESSURE 80-89 MM HG: ICD-10-PCS | Mod: CPTII,S$GLB,, | Performed by: INTERNAL MEDICINE

## 2020-09-22 PROCEDURE — 3075F PR MOST RECENT SYSTOLIC BLOOD PRESS GE 130-139MM HG: ICD-10-PCS | Mod: CPTII,S$GLB,, | Performed by: INTERNAL MEDICINE

## 2020-09-22 PROCEDURE — 99999 PR PBB SHADOW E&M-EST. PATIENT-LVL V: CPT | Mod: PBBFAC,,, | Performed by: INTERNAL MEDICINE

## 2020-09-22 PROCEDURE — 3079F DIAST BP 80-89 MM HG: CPT | Mod: CPTII,S$GLB,, | Performed by: INTERNAL MEDICINE

## 2020-09-22 PROCEDURE — 3075F SYST BP GE 130 - 139MM HG: CPT | Mod: CPTII,S$GLB,, | Performed by: INTERNAL MEDICINE

## 2020-09-22 PROCEDURE — 99215 PR OFFICE/OUTPT VISIT, EST, LEVL V, 40-54 MIN: ICD-10-PCS | Mod: S$GLB,,, | Performed by: INTERNAL MEDICINE

## 2020-09-22 PROCEDURE — 99215 OFFICE O/P EST HI 40 MIN: CPT | Mod: S$GLB,,, | Performed by: INTERNAL MEDICINE
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
## 2020-09-23 ENCOUNTER — IMMUNIZATION (OUTPATIENT)
Dept: PHARMACY | Facility: CLINIC | Age: 50
End: 2020-09-23
Payer: COMMERCIAL

## 2020-09-23 ENCOUNTER — IMMUNIZATION (OUTPATIENT)
Dept: PHARMACY | Facility: CLINIC | Age: 50
End: 2020-09-23

## 2020-10-06 ENCOUNTER — LAB VISIT (OUTPATIENT)
Dept: LAB | Facility: OTHER | Age: 50
End: 2020-10-06
Attending: INTERNAL MEDICINE
Payer: COMMERCIAL

## 2020-10-06 DIAGNOSIS — Z13.1 ENCOUNTER FOR SCREENING FOR DIABETES MELLITUS: ICD-10-CM

## 2020-10-06 DIAGNOSIS — M85.80 OSTEOPENIA, UNSPECIFIED LOCATION: ICD-10-CM

## 2020-10-06 DIAGNOSIS — Z11.59 NEED FOR HEPATITIS C SCREENING TEST: ICD-10-CM

## 2020-10-06 DIAGNOSIS — I10 ESSENTIAL HYPERTENSION: ICD-10-CM

## 2020-10-06 DIAGNOSIS — Z11.4 SCREENING FOR HIV (HUMAN IMMUNODEFICIENCY VIRUS): ICD-10-CM

## 2020-10-06 DIAGNOSIS — E78.2 MIXED HYPERLIPIDEMIA: ICD-10-CM

## 2020-10-06 LAB
25(OH)D3+25(OH)D2 SERPL-MCNC: 26 NG/ML (ref 30–96)
ALBUMIN SERPL BCP-MCNC: 4.2 G/DL (ref 3.5–5.2)
ALP SERPL-CCNC: 57 U/L (ref 55–135)
ALT SERPL W/O P-5'-P-CCNC: 27 U/L (ref 10–44)
ANION GAP SERPL CALC-SCNC: 12 MMOL/L (ref 8–16)
AST SERPL-CCNC: 21 U/L (ref 10–40)
BILIRUB SERPL-MCNC: 0.4 MG/DL (ref 0.1–1)
BUN SERPL-MCNC: 18 MG/DL (ref 6–20)
CALCIUM SERPL-MCNC: 10 MG/DL (ref 8.7–10.5)
CHLORIDE SERPL-SCNC: 104 MMOL/L (ref 95–110)
CHOLEST SERPL-MCNC: 161 MG/DL (ref 120–199)
CHOLEST/HDLC SERPL: 3.2 {RATIO} (ref 2–5)
CO2 SERPL-SCNC: 24 MMOL/L (ref 23–29)
CREAT SERPL-MCNC: 1 MG/DL (ref 0.5–1.4)
EST. GFR  (AFRICAN AMERICAN): >60 ML/MIN/1.73 M^2
EST. GFR  (NON AFRICAN AMERICAN): >60 ML/MIN/1.73 M^2
ESTIMATED AVG GLUCOSE: 105 MG/DL (ref 68–131)
GLUCOSE SERPL-MCNC: 103 MG/DL (ref 70–110)
HBA1C MFR BLD HPLC: 5.3 % (ref 4–5.6)
HDLC SERPL-MCNC: 50 MG/DL (ref 40–75)
HDLC SERPL: 31.1 % (ref 20–50)
LDLC SERPL CALC-MCNC: 86.8 MG/DL (ref 63–159)
NONHDLC SERPL-MCNC: 111 MG/DL
POTASSIUM SERPL-SCNC: 3.9 MMOL/L (ref 3.5–5.1)
PROT SERPL-MCNC: 7.4 G/DL (ref 6–8.4)
SODIUM SERPL-SCNC: 140 MMOL/L (ref 136–145)
TRIGL SERPL-MCNC: 121 MG/DL (ref 30–150)

## 2020-10-06 PROCEDURE — 82306 VITAMIN D 25 HYDROXY: CPT

## 2020-10-06 PROCEDURE — 36415 COLL VENOUS BLD VENIPUNCTURE: CPT

## 2020-10-06 PROCEDURE — 83036 HEMOGLOBIN GLYCOSYLATED A1C: CPT

## 2020-10-06 PROCEDURE — 80053 COMPREHEN METABOLIC PANEL: CPT

## 2020-10-06 PROCEDURE — 86803 HEPATITIS C AB TEST: CPT

## 2020-10-06 PROCEDURE — 80061 LIPID PANEL: CPT

## 2020-10-06 PROCEDURE — 86703 HIV-1/HIV-2 1 RESULT ANTBDY: CPT

## 2020-10-08 LAB
HCV AB SERPL QL IA: NEGATIVE
HIV 1+2 AB+HIV1 P24 AG SERPL QL IA: NEGATIVE

## 2020-10-28 ENCOUNTER — PATIENT MESSAGE (OUTPATIENT)
Dept: OBSTETRICS AND GYNECOLOGY | Facility: CLINIC | Age: 50
End: 2020-10-28

## 2020-11-18 ENCOUNTER — PATIENT MESSAGE (OUTPATIENT)
Dept: INTERNAL MEDICINE | Facility: CLINIC | Age: 50
End: 2020-11-18

## 2020-11-18 DIAGNOSIS — K21.9 GASTROESOPHAGEAL REFLUX DISEASE: ICD-10-CM

## 2020-11-18 DIAGNOSIS — K29.70 GASTRITIS WITHOUT BLEEDING, UNSPECIFIED CHRONICITY, UNSPECIFIED GASTRITIS TYPE: ICD-10-CM

## 2020-11-18 DIAGNOSIS — K63.5 POLYP OF COLON, UNSPECIFIED PART OF COLON, UNSPECIFIED TYPE: ICD-10-CM

## 2020-11-18 RX ORDER — PANTOPRAZOLE SODIUM 20 MG/1
20 TABLET, DELAYED RELEASE ORAL DAILY
Qty: 90 TABLET | Refills: 3 | Status: SHIPPED | OUTPATIENT
Start: 2020-11-18

## 2020-11-18 NOTE — TELEPHONE ENCOUNTER
Refilled Protonix.    Jigna, could you add her colonoscopy report to her healthcare maintenance?  She took a picture of it or you can request it from [facility]. Thanks!

## 2020-11-19 ENCOUNTER — PATIENT OUTREACH (OUTPATIENT)
Dept: FAMILY MEDICINE | Facility: CLINIC | Age: 50
End: 2020-11-19

## 2020-11-24 ENCOUNTER — PATIENT OUTREACH (OUTPATIENT)
Dept: FAMILY MEDICINE | Facility: CLINIC | Age: 50
End: 2020-11-24

## 2020-11-24 PROBLEM — E66.3 OVERWEIGHT WITH BODY MASS INDEX (BMI) 25.0-29.9: Status: ACTIVE | Noted: 2018-06-28

## 2020-11-24 PROBLEM — L81.9 DISCOLORATION OF SKIN: Status: ACTIVE | Noted: 2019-03-02

## 2020-11-24 PROBLEM — E55.9 VITAMIN D DEFICIENCY: Status: ACTIVE | Noted: 2017-08-24

## 2020-12-10 ENCOUNTER — PATIENT OUTREACH (OUTPATIENT)
Dept: ADMINISTRATIVE | Facility: HOSPITAL | Age: 50
End: 2020-12-10

## 2020-12-10 PROBLEM — F41.8 MIXED ANXIETY AND DEPRESSIVE DISORDER: Status: ACTIVE | Noted: 2017-06-27

## 2020-12-15 ENCOUNTER — TELEPHONE (OUTPATIENT)
Dept: PAIN MEDICINE | Facility: CLINIC | Age: 50
End: 2020-12-15

## 2020-12-15 NOTE — TELEPHONE ENCOUNTER
"This message is for patient in regards to his/her appointment 12/16/20 at 08:30a       [facility] Policy: For the safety of all patients and staff members.     Patient Visitor policy: Due to social distancing and limited seating staff are requesting patient to arrive to their schedule appointments alone. If patient do not need assistance with their visit, we're asking all visitors to remain outside the waiting area.    Upon arriving to facility; patient will have temperature taking and are required to wear a face mask, if patient do not have a face mask one will be provided. Upon arriving patient we ask patients to contact clinic at this number [phone] to notify staff that they have arrived or they may do so by utilizing the Mobile checked in [facility](if patient have patient portal; clinical staff will send a message through there letting them know it's okay to proceed to their visit). Staff will give the patient the "okay" to come up or wait inside their vehicle until clinic is ready for patient to be seen by Dr. Mai Akbar MD. If you have any questions or concerns please contact [phone]    Staff left a voicemail reminding patient of their appt.    "

## 2020-12-16 ENCOUNTER — OFFICE VISIT (OUTPATIENT)
Dept: PAIN MEDICINE | Facility: CLINIC | Age: 50
End: 2020-12-16
Attending: ANESTHESIOLOGY
Payer: COMMERCIAL

## 2020-12-16 VITALS
HEART RATE: 97 BPM | TEMPERATURE: 98 F | OXYGEN SATURATION: 100 % | DIASTOLIC BLOOD PRESSURE: 94 MMHG | WEIGHT: 154.31 LBS | RESPIRATION RATE: 18 BRPM | BODY MASS INDEX: 24.8 KG/M2 | HEIGHT: 66 IN | SYSTOLIC BLOOD PRESSURE: 132 MMHG

## 2020-12-16 DIAGNOSIS — G43.019 INTRACTABLE MIGRAINE WITHOUT AURA AND WITHOUT STATUS MIGRAINOSUS: ICD-10-CM

## 2020-12-16 DIAGNOSIS — G43.119 INTRACTABLE MIGRAINE WITH AURA WITHOUT STATUS MIGRAINOSUS: Primary | ICD-10-CM

## 2020-12-16 DIAGNOSIS — G24.3 ISOLATED CERVICAL DYSTONIA: ICD-10-CM

## 2020-12-16 PROCEDURE — 99999 PR PBB SHADOW E&M-EST. PATIENT-LVL V: CPT | Mod: PBBFAC,,, | Performed by: ANESTHESIOLOGY

## 2020-12-16 PROCEDURE — 64612 DESTROY NERVE FACE MUSCLE: CPT | Mod: S$GLB,,, | Performed by: ANESTHESIOLOGY

## 2020-12-16 PROCEDURE — 99499 UNLISTED E&M SERVICE: CPT | Mod: S$GLB,,, | Performed by: ANESTHESIOLOGY

## 2020-12-16 PROCEDURE — 3008F BODY MASS INDEX DOCD: CPT | Mod: CPTII,S$GLB,, | Performed by: ANESTHESIOLOGY

## 2020-12-16 PROCEDURE — 99499 NO LOS: ICD-10-PCS | Mod: S$GLB,,, | Performed by: ANESTHESIOLOGY

## 2020-12-16 PROCEDURE — 99999 PR PBB SHADOW E&M-EST. PATIENT-LVL V: ICD-10-PCS | Mod: PBBFAC,,, | Performed by: ANESTHESIOLOGY

## 2020-12-16 PROCEDURE — 1125F AMNT PAIN NOTED PAIN PRSNT: CPT | Mod: S$GLB,,, | Performed by: ANESTHESIOLOGY

## 2020-12-16 PROCEDURE — 3008F PR BODY MASS INDEX (BMI) DOCUMENTED: ICD-10-PCS | Mod: CPTII,S$GLB,, | Performed by: ANESTHESIOLOGY

## 2020-12-16 PROCEDURE — 1125F PR PAIN SEVERITY QUANTIFIED, PAIN PRESENT: ICD-10-PCS | Mod: S$GLB,,, | Performed by: ANESTHESIOLOGY

## 2020-12-16 PROCEDURE — 64612 PR DEST,NERVE,FACIAL: ICD-10-PCS | Mod: S$GLB,,, | Performed by: ANESTHESIOLOGY

## 2020-12-16 NOTE — PROGRESS NOTES
Subjective:      Patient ID: Esme Louise is a 50 y.o. female.    Chief Complaint: No chief complaint on file.    Referred by: Mai Akbar MD     She is here for follow-up and for Botox injection.  Last time she received 150 units.  She said it worked very well.  No new complaints.  No untoward side effects.           Interventional Pain History    Past Medical History:   Diagnosis Date    Anemia     Anxiety     Arthritis     Arthritis     Breast disorder     Depression     Endometriosis     Headaches, cluster     Hypertension     Mental disorder     Occipital neuralgia     Premature ovarian failure     Thyroid disease        Past Surgical History:   Procedure Laterality Date    BREAST LUMPECTOMY Right     benign     ENDOMETRIAL ABLATION      endometriosis     OCCIPITAL NERVE STIMULATOR INSERTION  2015    OOPHORECTOMY Right     d/t endometriosis        Review of patient's allergies indicates:   Allergen Reactions    Pcn [penicillins] Swelling    Zoloft [sertraline] Hives and Swelling       Current Outpatient Medications   Medication Sig Dispense Refill    butalbital-acetaminophen-caff -40 mg Cap TK ONE C PO Q 4 HOURS  0    clonazePAM (KLONOPIN) 1 MG tablet       cyclobenzaprine (AMRIX) 15 MG 24 hr capsule TAKE 1 CAPSULE BY MOUTH DAILY AT 6 IN THE EVENING compounded especially for you by the pharmacist  3    diclofenac (VOLTAREN) 50 MG EC tablet TK 1 T PO BID  0    DULoxetine (CYMBALTA) 60 MG capsule       fenofibrate 160 MG Tab Take 1 tablet (160 mg total) by mouth once daily. 90 tablet 4    levothyroxine (SYNTHROID) 100 MCG tablet Take 1 tablet (100 mcg total) by mouth once daily. 30 tablet 11    losartan (COZAAR) 50 MG tablet Take 1 tablet (50 mg total) by mouth once daily. 30 tablet 11    methotrexate 2.5 MG Tab Take by mouth every 7 days.      NUVIGIL 250 mg tablet TK 1 T PO QD  5    onabotulinumtoxinA (BOTOX INJ) Inject as directed.      ondansetron (ZOFRAN-ODT) 8 MG TbDL        pantoprazole (PROTONIX) 20 MG tablet Take 1 tablet (20 mg total) by mouth once daily. 90 tablet 3    pravastatin (PRAVACHOL) 40 MG tablet Take 1 tablet (40 mg total) by mouth once daily. 90 tablet 4    propranolol (INDERAL) 10 MG tablet       sodium chloride 0.9% SolP 100 mL with abatacept (with maltose) 250 mg SolR abatacept   750MG 1X MONTH      tizanidine 2 mg Cap Take 2 mg by mouth as needed.      zolmitriptan (ZOMIG) 5 MG tablet TK 1 T PO  ONCE PRN HA  3    cholecalciferol, vitamin D3, 50,000 unit Tab Take 1 tablet by mouth once a week. For 8 weeks 8 tablet 0    folic acid (FOLVITE) 1 MG tablet Take 1 mg by mouth once daily.       No current facility-administered medications for this visit.        Family History   Problem Relation Age of Onset    Autoimmune disease Maternal Uncle     Diabetes Maternal Grandmother     Miscarriages / Stillbirths Maternal Grandmother     Colon cancer Maternal Grandfather 70    Schizophrenia Father     Suicide Father     Breast cancer Neg Hx     Eclampsia Neg Hx     Hypertension Neg Hx     Ovarian cancer Neg Hx        Social History     Socioeconomic History    Marital status:      Spouse name: Not on file    Number of children: Not on file    Years of education: Not on file    Highest education level: Not on file   Occupational History     Comment: search engine optimization    Social Needs    Financial resource strain: Not very hard    Food insecurity     Worry: Never true     Inability: Never true    Transportation needs     Medical: No     Non-medical: No   Tobacco Use    Smoking status: Former Smoker     Packs/day: 0.75     Years: 5.00     Pack years: 3.75     Types: Cigarettes     Quit date: 2006     Years since quittin.9    Smokeless tobacco: Never Used    Tobacco comment: smoke cloves in the past   Substance and Sexual Activity    Alcohol use: Yes     Frequency: Monthly or less     Drinks per session: 1 or 2     Binge  "frequency: Never     Comment: worsen migraines     Drug use: No    Sexual activity: Yes     Partners: Female     Birth control/protection: None   Lifestyle    Physical activity     Days per week: 1 day     Minutes per session: 30 min    Stress: Only a little   Relationships    Social connections     Talks on phone: Twice a week     Gets together: Never     Attends [employer] service: Not on file     Active member of club or organization: No     Attends meetings of clubs or organizations: Never     Relationship status:    Other Topics Concern    Not on file   Social History Narrative    Not on file           Review of Systems   Constitution: Negative for chills, fever, malaise/fatigue, weight gain and weight loss.   HENT: Negative for ear pain and hoarse voice.    Eyes: Negative for blurred vision, pain and visual disturbance.   Cardiovascular: Negative for chest pain, dyspnea on exertion and irregular heartbeat.   Respiratory: Negative for cough, shortness of breath and wheezing.    Endocrine: Negative for cold intolerance and heat intolerance.   Hematologic/Lymphatic: Negative for adenopathy and bleeding problem. Does not bruise/bleed easily.   Skin: Negative for color change, itching and rash.   Musculoskeletal: Negative for back pain and neck pain.   Gastrointestinal: Negative for change in bowel habit, diarrhea, hematemesis, hematochezia, melena and vomiting.   Genitourinary: Negative for flank pain, frequency, hematuria and urgency.   Neurological: Negative for difficulty with concentration, dizziness, headaches, loss of balance and seizures.   Psychiatric/Behavioral: Negative for altered mental status, depression and suicidal ideas. The patient is not nervous/anxious.    Allergic/Immunologic: Negative for HIV exposure.         Objective:   BP (!) 132/94   Pulse 97   Temp 97.7 °F (36.5 °C)   Resp 18   Ht 5' 6" (1.676 m)   Wt 70 kg (154 lb 5.2 oz)   LMP 03/20/2018 (Approximate)   SpO2 100%   " BMI 24.91 kg/m²   Pain Disability Index Review:  Last 3 PDI Scores 12/16/2020 9/16/2020 6/16/2020   Pain Disability Index (PDI) 14 10 21     Normocephalic.  Atraumatic.  Affect appropriate.  Breathing unlabored.  Extra ocular muscles intact.       Assessment:            Encounter Diagnosis   Name Primary?    Intractable migraine with aura without status migrainosus Yes          Plan:     We discussed with the patient the assessment and recommendations. The following is the plan we agreed on:  Procedure:  Under clean technique and after discussing with the patient 200 units of Botox were mixed.  50 units were waste.  We injected the procerus in the midline.  We injected bilateral , frontalis, temporalis, splenius capitis and upper trapezius.  The patient tolerated procedure well.  Return as needed otherwise come back in 3 months for repeat injection      Esme was seen today for botulinum toxin injection.     Diagnoses and all orders for this visit:     Intractable migraine with aura without status migrainosus  -     onabotulinumtoxina injection 200 Units          [place] MD  12/16/2020    I have personally taken the history and examined this patient and agree with the resident's note as stated above.

## 2021-01-11 ENCOUNTER — TELEPHONE (OUTPATIENT)
Dept: INTERNAL MEDICINE | Facility: CLINIC | Age: 51
End: 2021-01-11

## 2021-01-21 ENCOUNTER — PATIENT MESSAGE (OUTPATIENT)
Dept: OBSTETRICS AND GYNECOLOGY | Facility: CLINIC | Age: 51
End: 2021-01-21

## 2021-01-22 RX ORDER — ESTRADIOL 1 MG/1
1 TABLET ORAL DAILY
Qty: 90 TABLET | Refills: 3 | Status: SHIPPED | OUTPATIENT
Start: 2021-01-22 | End: 2022-02-21 | Stop reason: SDUPTHER

## 2021-01-22 RX ORDER — PROGESTERONE 100 MG/1
100 CAPSULE ORAL DAILY
Qty: 90 CAPSULE | Refills: 3 | Status: SHIPPED | OUTPATIENT
Start: 2021-01-22 | End: 2022-02-21 | Stop reason: SDUPTHER

## 2021-02-22 ENCOUNTER — TELEPHONE (OUTPATIENT)
Dept: INTERNAL MEDICINE | Facility: CLINIC | Age: 51
End: 2021-02-22

## 2021-03-16 ENCOUNTER — TELEPHONE (OUTPATIENT)
Dept: PAIN MEDICINE | Facility: CLINIC | Age: 51
End: 2021-03-16

## 2021-03-17 ENCOUNTER — OFFICE VISIT (OUTPATIENT)
Dept: PAIN MEDICINE | Facility: CLINIC | Age: 51
End: 2021-03-17
Attending: ANESTHESIOLOGY
Payer: COMMERCIAL

## 2021-03-17 ENCOUNTER — TELEPHONE (OUTPATIENT)
Dept: PAIN MEDICINE | Facility: CLINIC | Age: 51
End: 2021-03-17

## 2021-03-17 VITALS
BODY MASS INDEX: 25.66 KG/M2 | HEART RATE: 83 BPM | DIASTOLIC BLOOD PRESSURE: 89 MMHG | WEIGHT: 159.63 LBS | SYSTOLIC BLOOD PRESSURE: 124 MMHG | RESPIRATION RATE: 18 BRPM | TEMPERATURE: 98 F | HEIGHT: 66 IN

## 2021-03-17 DIAGNOSIS — G43.119 INTRACTABLE MIGRAINE WITH AURA WITHOUT STATUS MIGRAINOSUS: Primary | ICD-10-CM

## 2021-03-17 PROCEDURE — 99999 PR PBB SHADOW E&M-EST. PATIENT-LVL V: CPT | Mod: PBBFAC,,, | Performed by: ANESTHESIOLOGY

## 2021-03-17 PROCEDURE — 1126F AMNT PAIN NOTED NONE PRSNT: CPT | Mod: S$GLB,,, | Performed by: ANESTHESIOLOGY

## 2021-03-17 PROCEDURE — 64615 CHEMODENERV MUSC MIGRAINE: CPT | Mod: S$GLB,,, | Performed by: ANESTHESIOLOGY

## 2021-03-17 PROCEDURE — 64615 PR CHEMODENERVATION OF MUSCLE FOR CHRONIC MIGRAINE: ICD-10-PCS | Mod: S$GLB,,, | Performed by: ANESTHESIOLOGY

## 2021-03-17 PROCEDURE — 99999 PR PBB SHADOW E&M-EST. PATIENT-LVL V: ICD-10-PCS | Mod: PBBFAC,,, | Performed by: ANESTHESIOLOGY

## 2021-03-17 PROCEDURE — 1126F PR PAIN SEVERITY QUANTIFIED, NO PAIN PRESENT: ICD-10-PCS | Mod: S$GLB,,, | Performed by: ANESTHESIOLOGY

## 2021-03-17 PROCEDURE — 3008F BODY MASS INDEX DOCD: CPT | Mod: CPTII,S$GLB,, | Performed by: ANESTHESIOLOGY

## 2021-03-17 PROCEDURE — 99499 UNLISTED E&M SERVICE: CPT | Mod: S$GLB,,, | Performed by: ANESTHESIOLOGY

## 2021-03-17 PROCEDURE — 3008F PR BODY MASS INDEX (BMI) DOCUMENTED: ICD-10-PCS | Mod: CPTII,S$GLB,, | Performed by: ANESTHESIOLOGY

## 2021-03-17 PROCEDURE — 99499 NO LOS: ICD-10-PCS | Mod: S$GLB,,, | Performed by: ANESTHESIOLOGY

## 2021-06-21 ENCOUNTER — TELEPHONE (OUTPATIENT)
Dept: PAIN MEDICINE | Facility: CLINIC | Age: 51
End: 2021-06-21

## 2021-06-22 ENCOUNTER — OFFICE VISIT (OUTPATIENT)
Dept: PAIN MEDICINE | Facility: CLINIC | Age: 51
End: 2021-06-22
Attending: ANESTHESIOLOGY
Payer: COMMERCIAL

## 2021-06-22 ENCOUNTER — TELEPHONE (OUTPATIENT)
Dept: PAIN MEDICINE | Facility: CLINIC | Age: 51
End: 2021-06-22

## 2021-06-22 VITALS
HEART RATE: 89 BPM | SYSTOLIC BLOOD PRESSURE: 136 MMHG | WEIGHT: 158.75 LBS | RESPIRATION RATE: 18 BRPM | HEIGHT: 66 IN | DIASTOLIC BLOOD PRESSURE: 87 MMHG | BODY MASS INDEX: 25.51 KG/M2

## 2021-06-22 DIAGNOSIS — G24.3 ISOLATED CERVICAL DYSTONIA: ICD-10-CM

## 2021-06-22 DIAGNOSIS — G43.019 INTRACTABLE MIGRAINE WITHOUT AURA AND WITHOUT STATUS MIGRAINOSUS: Primary | ICD-10-CM

## 2021-06-22 DIAGNOSIS — M62.838 MUSCLE SPASM: ICD-10-CM

## 2021-06-22 PROCEDURE — 99499 NO LOS: ICD-10-PCS | Mod: S$GLB,,, | Performed by: ANESTHESIOLOGY

## 2021-06-22 PROCEDURE — 1125F PR PAIN SEVERITY QUANTIFIED, PAIN PRESENT: ICD-10-PCS | Mod: S$GLB,,, | Performed by: ANESTHESIOLOGY

## 2021-06-22 PROCEDURE — 1125F AMNT PAIN NOTED PAIN PRSNT: CPT | Mod: S$GLB,,, | Performed by: ANESTHESIOLOGY

## 2021-06-22 PROCEDURE — 3008F BODY MASS INDEX DOCD: CPT | Mod: CPTII,S$GLB,, | Performed by: ANESTHESIOLOGY

## 2021-06-22 PROCEDURE — 99999 PR PBB SHADOW E&M-EST. PATIENT-LVL IV: CPT | Mod: PBBFAC,,, | Performed by: ANESTHESIOLOGY

## 2021-06-22 PROCEDURE — 99499 UNLISTED E&M SERVICE: CPT | Mod: S$GLB,,, | Performed by: ANESTHESIOLOGY

## 2021-06-22 PROCEDURE — 3008F PR BODY MASS INDEX (BMI) DOCUMENTED: ICD-10-PCS | Mod: CPTII,S$GLB,, | Performed by: ANESTHESIOLOGY

## 2021-06-22 PROCEDURE — 99999 PR PBB SHADOW E&M-EST. PATIENT-LVL IV: ICD-10-PCS | Mod: PBBFAC,,, | Performed by: ANESTHESIOLOGY

## 2021-07-07 DIAGNOSIS — Z12.31 OTHER SCREENING MAMMOGRAM: ICD-10-CM

## 2021-09-10 ENCOUNTER — TELEPHONE (OUTPATIENT)
Dept: PAIN MEDICINE | Facility: CLINIC | Age: 51
End: 2021-09-10

## 2021-09-16 ENCOUNTER — PATIENT MESSAGE (OUTPATIENT)
Dept: PAIN MEDICINE | Facility: CLINIC | Age: 51
End: 2021-09-16

## 2021-09-24 ENCOUNTER — TELEPHONE (OUTPATIENT)
Dept: PAIN MEDICINE | Facility: CLINIC | Age: 51
End: 2021-09-24

## 2021-09-24 DIAGNOSIS — G43.019 INTRACTABLE MIGRAINE WITHOUT AURA AND WITHOUT STATUS MIGRAINOSUS: Primary | ICD-10-CM

## 2021-09-25 ENCOUNTER — PATIENT MESSAGE (OUTPATIENT)
Dept: PAIN MEDICINE | Facility: CLINIC | Age: 51
End: 2021-09-25

## 2021-09-29 ENCOUNTER — TELEPHONE (OUTPATIENT)
Dept: PAIN MEDICINE | Facility: CLINIC | Age: 51
End: 2021-09-29

## 2021-09-29 ENCOUNTER — PATIENT MESSAGE (OUTPATIENT)
Dept: PAIN MEDICINE | Facility: CLINIC | Age: 51
End: 2021-09-29

## 2021-09-29 DIAGNOSIS — G24.3 ISOLATED CERVICAL DYSTONIA: ICD-10-CM

## 2021-09-29 DIAGNOSIS — G43.019 INTRACTABLE MIGRAINE WITHOUT AURA AND WITHOUT STATUS MIGRAINOSUS: Primary | ICD-10-CM

## 2021-09-29 DIAGNOSIS — M62.838 MUSCLE SPASM: ICD-10-CM

## 2021-10-04 ENCOUNTER — PATIENT MESSAGE (OUTPATIENT)
Dept: ADMINISTRATIVE | Facility: HOSPITAL | Age: 51
End: 2021-10-04

## 2021-10-12 ENCOUNTER — TELEPHONE (OUTPATIENT)
Dept: PAIN MEDICINE | Facility: CLINIC | Age: 51
End: 2021-10-12

## 2021-10-13 ENCOUNTER — OFFICE VISIT (OUTPATIENT)
Dept: PAIN MEDICINE | Facility: CLINIC | Age: 51
End: 2021-10-13
Attending: ANESTHESIOLOGY
Payer: COMMERCIAL

## 2021-10-13 VITALS
SYSTOLIC BLOOD PRESSURE: 144 MMHG | HEIGHT: 66 IN | BODY MASS INDEX: 26.22 KG/M2 | DIASTOLIC BLOOD PRESSURE: 94 MMHG | RESPIRATION RATE: 18 BRPM | TEMPERATURE: 99 F | WEIGHT: 163.13 LBS | HEART RATE: 89 BPM

## 2021-10-13 DIAGNOSIS — G43.019 INTRACTABLE MIGRAINE WITHOUT AURA AND WITHOUT STATUS MIGRAINOSUS: Primary | ICD-10-CM

## 2021-10-13 PROCEDURE — 4010F PR ACE/ARB THEARPY RXD/TAKEN: ICD-10-PCS | Mod: CPTII,S$GLB,, | Performed by: ANESTHESIOLOGY

## 2021-10-13 PROCEDURE — 99999 PR PBB SHADOW E&M-EST. PATIENT-LVL IV: CPT | Mod: PBBFAC,,, | Performed by: ANESTHESIOLOGY

## 2021-10-13 PROCEDURE — 3080F PR MOST RECENT DIASTOLIC BLOOD PRESSURE >= 90 MM HG: ICD-10-PCS | Mod: CPTII,S$GLB,, | Performed by: ANESTHESIOLOGY

## 2021-10-13 PROCEDURE — 1160F PR REVIEW ALL MEDS BY PRESCRIBER/CLIN PHARMACIST DOCUMENTED: ICD-10-PCS | Mod: CPTII,S$GLB,, | Performed by: ANESTHESIOLOGY

## 2021-10-13 PROCEDURE — 99499 UNLISTED E&M SERVICE: CPT | Mod: S$GLB,,, | Performed by: ANESTHESIOLOGY

## 2021-10-13 PROCEDURE — 1159F MED LIST DOCD IN RCRD: CPT | Mod: CPTII,S$GLB,, | Performed by: ANESTHESIOLOGY

## 2021-10-13 PROCEDURE — 64615 CHEMODENERV MUSC MIGRAINE: CPT | Mod: S$GLB,,, | Performed by: ANESTHESIOLOGY

## 2021-10-13 PROCEDURE — 64615 PR CHEMODENERVATION OF MUSCLE FOR CHRONIC MIGRAINE: ICD-10-PCS | Mod: S$GLB,,, | Performed by: ANESTHESIOLOGY

## 2021-10-13 PROCEDURE — 3008F BODY MASS INDEX DOCD: CPT | Mod: CPTII,S$GLB,, | Performed by: ANESTHESIOLOGY

## 2021-10-13 PROCEDURE — 3077F PR MOST RECENT SYSTOLIC BLOOD PRESSURE >= 140 MM HG: ICD-10-PCS | Mod: CPTII,S$GLB,, | Performed by: ANESTHESIOLOGY

## 2021-10-13 PROCEDURE — 99499 NO LOS: ICD-10-PCS | Mod: S$GLB,,, | Performed by: ANESTHESIOLOGY

## 2021-10-13 PROCEDURE — 3080F DIAST BP >= 90 MM HG: CPT | Mod: CPTII,S$GLB,, | Performed by: ANESTHESIOLOGY

## 2021-10-13 PROCEDURE — 99999 PR PBB SHADOW E&M-EST. PATIENT-LVL IV: ICD-10-PCS | Mod: PBBFAC,,, | Performed by: ANESTHESIOLOGY

## 2021-10-13 PROCEDURE — 3008F PR BODY MASS INDEX (BMI) DOCUMENTED: ICD-10-PCS | Mod: CPTII,S$GLB,, | Performed by: ANESTHESIOLOGY

## 2021-10-13 PROCEDURE — 1159F PR MEDICATION LIST DOCUMENTED IN MEDICAL RECORD: ICD-10-PCS | Mod: CPTII,S$GLB,, | Performed by: ANESTHESIOLOGY

## 2021-10-13 PROCEDURE — 4010F ACE/ARB THERAPY RXD/TAKEN: CPT | Mod: CPTII,S$GLB,, | Performed by: ANESTHESIOLOGY

## 2021-10-13 PROCEDURE — 3077F SYST BP >= 140 MM HG: CPT | Mod: CPTII,S$GLB,, | Performed by: ANESTHESIOLOGY

## 2021-10-13 PROCEDURE — 1160F RVW MEDS BY RX/DR IN RCRD: CPT | Mod: CPTII,S$GLB,, | Performed by: ANESTHESIOLOGY

## 2021-11-09 ENCOUNTER — PATIENT MESSAGE (OUTPATIENT)
Dept: PAIN MEDICINE | Facility: CLINIC | Age: 51
End: 2021-11-09
Payer: COMMERCIAL

## 2021-11-15 ENCOUNTER — PATIENT OUTREACH (OUTPATIENT)
Dept: ADMINISTRATIVE | Facility: OTHER | Age: 51
End: 2021-11-15
Payer: COMMERCIAL

## 2021-11-15 ENCOUNTER — PATIENT MESSAGE (OUTPATIENT)
Dept: PAIN MEDICINE | Facility: CLINIC | Age: 51
End: 2021-11-15
Payer: COMMERCIAL

## 2021-11-16 ENCOUNTER — OFFICE VISIT (OUTPATIENT)
Dept: PAIN MEDICINE | Facility: CLINIC | Age: 51
End: 2021-11-16
Attending: ANESTHESIOLOGY
Payer: COMMERCIAL

## 2021-11-16 DIAGNOSIS — G24.3 ISOLATED CERVICAL DYSTONIA: ICD-10-CM

## 2021-11-16 DIAGNOSIS — G43.019 INTRACTABLE MIGRAINE WITHOUT AURA AND WITHOUT STATUS MIGRAINOSUS: Primary | ICD-10-CM

## 2021-11-16 PROCEDURE — 99213 OFFICE O/P EST LOW 20 MIN: CPT | Mod: 95,,, | Performed by: ANESTHESIOLOGY

## 2021-11-16 PROCEDURE — 1160F RVW MEDS BY RX/DR IN RCRD: CPT | Mod: CPTII,95,, | Performed by: ANESTHESIOLOGY

## 2021-11-16 PROCEDURE — 4010F PR ACE/ARB THEARPY RXD/TAKEN: ICD-10-PCS | Mod: CPTII,95,, | Performed by: ANESTHESIOLOGY

## 2021-11-16 PROCEDURE — 1160F PR REVIEW ALL MEDS BY PRESCRIBER/CLIN PHARMACIST DOCUMENTED: ICD-10-PCS | Mod: CPTII,95,, | Performed by: ANESTHESIOLOGY

## 2021-11-16 PROCEDURE — 1159F MED LIST DOCD IN RCRD: CPT | Mod: CPTII,95,, | Performed by: ANESTHESIOLOGY

## 2021-11-16 PROCEDURE — 99213 PR OFFICE/OUTPT VISIT, EST, LEVL III, 20-29 MIN: ICD-10-PCS | Mod: 95,,, | Performed by: ANESTHESIOLOGY

## 2021-11-16 PROCEDURE — 4010F ACE/ARB THERAPY RXD/TAKEN: CPT | Mod: CPTII,95,, | Performed by: ANESTHESIOLOGY

## 2021-11-16 PROCEDURE — 1159F PR MEDICATION LIST DOCUMENTED IN MEDICAL RECORD: ICD-10-PCS | Mod: CPTII,95,, | Performed by: ANESTHESIOLOGY

## 2021-12-19 ENCOUNTER — PATIENT MESSAGE (OUTPATIENT)
Dept: PAIN MEDICINE | Facility: CLINIC | Age: 51
End: 2021-12-19
Payer: COMMERCIAL

## 2021-12-28 ENCOUNTER — TELEPHONE (OUTPATIENT)
Dept: PAIN MEDICINE | Facility: CLINIC | Age: 51
End: 2021-12-28
Payer: COMMERCIAL

## 2022-01-10 ENCOUNTER — PATIENT MESSAGE (OUTPATIENT)
Dept: PAIN MEDICINE | Facility: CLINIC | Age: 52
End: 2022-01-10
Payer: COMMERCIAL

## 2022-01-24 ENCOUNTER — PATIENT MESSAGE (OUTPATIENT)
Dept: OBSTETRICS AND GYNECOLOGY | Facility: CLINIC | Age: 52
End: 2022-01-24
Payer: COMMERCIAL

## 2022-02-07 ENCOUNTER — TELEPHONE (OUTPATIENT)
Dept: PAIN MEDICINE | Facility: CLINIC | Age: 52
End: 2022-02-07
Payer: COMMERCIAL

## 2022-02-07 NOTE — TELEPHONE ENCOUNTER
Staff left a voicemail for patient in regards to her schedule botox appt 2/8/22 with Pain Provider Dr. Mai Akbar MD        Staff apologized and informed that appointment is still pending approval and may have to be reschedule if not approved in time.      Staff requested for patient to return call to clinical staff.

## 2022-02-19 ENCOUNTER — PATIENT MESSAGE (OUTPATIENT)
Dept: PAIN MEDICINE | Facility: CLINIC | Age: 52
End: 2022-02-19
Payer: COMMERCIAL

## 2022-02-19 ENCOUNTER — PATIENT MESSAGE (OUTPATIENT)
Dept: OBSTETRICS AND GYNECOLOGY | Facility: CLINIC | Age: 52
End: 2022-02-19
Payer: COMMERCIAL

## 2022-02-21 RX ORDER — ESTRADIOL 1 MG/1
1 TABLET ORAL DAILY
Qty: 90 TABLET | Refills: 1 | Status: SHIPPED | OUTPATIENT
Start: 2022-02-21 | End: 2022-08-15

## 2022-02-21 RX ORDER — PROGESTERONE 100 MG/1
100 CAPSULE ORAL DAILY
Qty: 90 CAPSULE | Refills: 1 | Status: SHIPPED | OUTPATIENT
Start: 2022-02-21 | End: 2022-08-20 | Stop reason: SDUPTHER

## 2022-03-10 ENCOUNTER — PATIENT MESSAGE (OUTPATIENT)
Dept: PAIN MEDICINE | Facility: CLINIC | Age: 52
End: 2022-03-10
Payer: COMMERCIAL

## 2022-03-15 ENCOUNTER — TELEPHONE (OUTPATIENT)
Dept: PAIN MEDICINE | Facility: CLINIC | Age: 52
End: 2022-03-15
Payer: COMMERCIAL

## 2022-03-15 DIAGNOSIS — G24.3 ISOLATED CERVICAL DYSTONIA: Primary | ICD-10-CM

## 2022-03-15 DIAGNOSIS — G43.019 INTRACTABLE MIGRAINE WITHOUT AURA AND WITHOUT STATUS MIGRAINOSUS: Primary | ICD-10-CM

## 2022-03-15 NOTE — TELEPHONE ENCOUNTER
----- Message from Momo Birmingham sent at 3/15/2022  2:53 PM CDT -----  Who called?:PT      What is the request in detail:PT states she would like to speak with Scott Angel about medication authorization and her procedure. Please advise        Can the clinic reply by [facility]?:Yes        Best Call Back Number:[phone]

## 2022-03-16 ENCOUNTER — TELEPHONE (OUTPATIENT)
Dept: PAIN MEDICINE | Facility: CLINIC | Age: 52
End: 2022-03-16
Payer: COMMERCIAL

## 2022-03-16 NOTE — TELEPHONE ENCOUNTER
Patient was contacted staff left a message on VM for patient to contact the office regarding an update on her authorization

## 2022-03-22 ENCOUNTER — TELEPHONE (OUTPATIENT)
Dept: PAIN MEDICINE | Facility: CLINIC | Age: 52
End: 2022-03-22
Payer: COMMERCIAL

## 2022-03-30 ENCOUNTER — TELEPHONE (OUTPATIENT)
Dept: PAIN MEDICINE | Facility: CLINIC | Age: 52
End: 2022-03-30
Payer: COMMERCIAL

## 2022-03-30 NOTE — TELEPHONE ENCOUNTER
Patient was contacted staff left a message on VM for patient to contact the office to schedule her botox injections

## 2022-04-07 ENCOUNTER — OFFICE VISIT (OUTPATIENT)
Dept: PAIN MEDICINE | Facility: CLINIC | Age: 52
End: 2022-04-07
Attending: ANESTHESIOLOGY
Payer: COMMERCIAL

## 2022-04-07 VITALS
BODY MASS INDEX: 26.52 KG/M2 | TEMPERATURE: 98 F | RESPIRATION RATE: 18 BRPM | DIASTOLIC BLOOD PRESSURE: 86 MMHG | SYSTOLIC BLOOD PRESSURE: 129 MMHG | OXYGEN SATURATION: 98 % | HEIGHT: 66 IN | WEIGHT: 165 LBS

## 2022-04-07 DIAGNOSIS — G43.019 INTRACTABLE MIGRAINE WITHOUT AURA AND WITHOUT STATUS MIGRAINOSUS: ICD-10-CM

## 2022-04-07 DIAGNOSIS — G43.119 INTRACTABLE MIGRAINE WITH AURA WITHOUT STATUS MIGRAINOSUS: Primary | ICD-10-CM

## 2022-04-07 PROCEDURE — 64615 CHEMODENERV MUSC MIGRAINE: CPT | Mod: S$GLB,,, | Performed by: ANESTHESIOLOGY

## 2022-04-07 PROCEDURE — 3008F BODY MASS INDEX DOCD: CPT | Mod: CPTII,S$GLB,, | Performed by: ANESTHESIOLOGY

## 2022-04-07 PROCEDURE — 99212 PR OFFICE/OUTPT VISIT, EST, LEVL II, 10-19 MIN: ICD-10-PCS | Mod: 25,S$GLB,, | Performed by: ANESTHESIOLOGY

## 2022-04-07 PROCEDURE — 3079F DIAST BP 80-89 MM HG: CPT | Mod: CPTII,S$GLB,, | Performed by: ANESTHESIOLOGY

## 2022-04-07 PROCEDURE — 4010F ACE/ARB THERAPY RXD/TAKEN: CPT | Mod: CPTII,S$GLB,, | Performed by: ANESTHESIOLOGY

## 2022-04-07 PROCEDURE — 1159F PR MEDICATION LIST DOCUMENTED IN MEDICAL RECORD: ICD-10-PCS | Mod: CPTII,S$GLB,, | Performed by: ANESTHESIOLOGY

## 2022-04-07 PROCEDURE — 1160F RVW MEDS BY RX/DR IN RCRD: CPT | Mod: CPTII,S$GLB,, | Performed by: ANESTHESIOLOGY

## 2022-04-07 PROCEDURE — 1160F PR REVIEW ALL MEDS BY PRESCRIBER/CLIN PHARMACIST DOCUMENTED: ICD-10-PCS | Mod: CPTII,S$GLB,, | Performed by: ANESTHESIOLOGY

## 2022-04-07 PROCEDURE — 3079F PR MOST RECENT DIASTOLIC BLOOD PRESSURE 80-89 MM HG: ICD-10-PCS | Mod: CPTII,S$GLB,, | Performed by: ANESTHESIOLOGY

## 2022-04-07 PROCEDURE — 99212 OFFICE O/P EST SF 10 MIN: CPT | Mod: 25,S$GLB,, | Performed by: ANESTHESIOLOGY

## 2022-04-07 PROCEDURE — 3074F SYST BP LT 130 MM HG: CPT | Mod: CPTII,S$GLB,, | Performed by: ANESTHESIOLOGY

## 2022-04-07 PROCEDURE — 64615 PR CHEMODENERVATION OF MUSCLE FOR CHRONIC MIGRAINE: ICD-10-PCS | Mod: S$GLB,,, | Performed by: ANESTHESIOLOGY

## 2022-04-07 PROCEDURE — 4010F PR ACE/ARB THEARPY RXD/TAKEN: ICD-10-PCS | Mod: CPTII,S$GLB,, | Performed by: ANESTHESIOLOGY

## 2022-04-07 PROCEDURE — 3074F PR MOST RECENT SYSTOLIC BLOOD PRESSURE < 130 MM HG: ICD-10-PCS | Mod: CPTII,S$GLB,, | Performed by: ANESTHESIOLOGY

## 2022-04-07 PROCEDURE — 1159F MED LIST DOCD IN RCRD: CPT | Mod: CPTII,S$GLB,, | Performed by: ANESTHESIOLOGY

## 2022-04-07 PROCEDURE — 99999 PR PBB SHADOW E&M-EST. PATIENT-LVL IV: CPT | Mod: PBBFAC,,, | Performed by: ANESTHESIOLOGY

## 2022-04-07 PROCEDURE — 99999 PR PBB SHADOW E&M-EST. PATIENT-LVL IV: ICD-10-PCS | Mod: PBBFAC,,, | Performed by: ANESTHESIOLOGY

## 2022-04-07 PROCEDURE — 3008F PR BODY MASS INDEX (BMI) DOCUMENTED: ICD-10-PCS | Mod: CPTII,S$GLB,, | Performed by: ANESTHESIOLOGY

## 2022-04-07 RX ORDER — VILAZODONE HYDROCHLORIDE 10 MG/1
10 TABLET ORAL DAILY
COMMUNITY
Start: 2022-04-06 | End: 2022-07-22 | Stop reason: SDUPTHER

## 2022-04-07 NOTE — PROGRESS NOTES
Subjective:      Patient ID: Esme Louise is a 51 y.o. female.    Chief Complaint: Headache (botox)    Referred by: Mai Akbar MD     HPI  Returns today for xeomin injection.  Last 1 was Dysport that only worked for 1 month and not as well as the Botox used to.  It also caused her worse headaches after the month was over    Past Medical History:   Diagnosis Date    Anemia     Anxiety     Arthritis     Arthritis     Breast disorder     Depression     Endometriosis     Headaches, cluster     Hypertension     Mental disorder     Occipital neuralgia     Premature ovarian failure     Thyroid disease        Past Surgical History:   Procedure Laterality Date    BREAST LUMPECTOMY Right     benign     ENDOMETRIAL ABLATION      endometriosis     OCCIPITAL NERVE STIMULATOR INSERTION  2015    OOPHORECTOMY Right     d/t endometriosis        Review of patient's allergies indicates:   Allergen Reactions    Pcn [penicillins] Swelling    Zoloft [sertraline] Hives and Swelling       Current Outpatient Medications   Medication Sig Dispense Refill    butalbital-acetaminophen-caff -40 mg Cap TK ONE C PO Q 4 HOURS  0    clonazePAM (KLONOPIN) 1 MG tablet       cyclobenzaprine (AMRIX) 15 MG 24 hr capsule TAKE 1 CAPSULE BY MOUTH DAILY AT 6 IN THE EVENING compounded especially for you by the pharmacist  3    diclofenac (VOLTAREN) 50 MG EC tablet TK 1 T PO BID  0    DULoxetine (CYMBALTA) 60 MG capsule       estradioL (ESTRACE) 1 MG tablet Take 1 tablet (1 mg total) by mouth once daily. 90 tablet 1    fenofibrate 160 MG Tab Take 1 tablet (160 mg total) by mouth once daily. 90 tablet 3    levothyroxine (SYNTHROID) 100 MCG tablet Take 1 tablet (100 mcg total) by mouth once daily. 90 tablet 3    losartan (COZAAR) 50 MG tablet Take 1 tablet (50 mg total) by mouth once daily. 90 tablet 3    methotrexate 2.5 MG Tab Take by mouth every 7 days.      NUVIGIL 250 mg tablet TK 1 T PO QD  5    onabotulinumtoxinA  "(BOTOX INJ) Inject as directed.      ondansetron (ZOFRAN-ODT) 8 MG TbDL       pantoprazole (PROTONIX) 20 MG tablet Take 1 tablet (20 mg total) by mouth once daily. 90 tablet 3    pravastatin (PRAVACHOL) 40 MG tablet Take 1 tablet (40 mg total) by mouth once daily. 90 tablet 3    progesterone (PROMETRIUM) 100 MG capsule Take 1 capsule (100 mg total) by mouth once daily. 90 capsule 1    propranolol (INDERAL) 10 MG tablet       sodium chloride 0.9% SolP 100 mL with abatacept (with maltose) 250 mg SolR abatacept   750MG 1X MONTH      tizanidine 2 mg Cap Take 2 mg by mouth as needed.      VIIBRYD 10 mg Tab tablet Take 10 mg by mouth once daily.      zolmitriptan (ZOMIG) 5 MG tablet TK 1 T PO  ONCE PRN HA  3     No current facility-administered medications for this visit.       Family History   Problem Relation Age of Onset    Autoimmune disease Maternal Uncle     Diabetes Maternal Grandmother     Miscarriages / Stillbirths Maternal Grandmother     Colon cancer Maternal Grandfather 70    Schizophrenia Father     Suicide Father     Breast cancer Neg Hx     Eclampsia Neg Hx     Hypertension Neg Hx     Ovarian cancer Neg Hx        Social History     Socioeconomic History    Marital status:    Occupational History     Comment: search engine optimization    Tobacco Use    Smoking status: Former Smoker     Packs/day: 0.75     Years: 5.00     Pack years: 3.75     Types: Cigarettes     Quit date: 2006     Years since quittin.2    Smokeless tobacco: Never Used    Tobacco comment: smoke cloves in the past   Substance and Sexual Activity    Alcohol use: Yes     Comment: worsen migraines     Drug use: No    Sexual activity: Yes     Partners: Female     Birth control/protection: None           ROS        Objective:   /86 (BP Location: Right arm, Patient Position: Sitting, BP Method: Medium (Automatic))   Temp 98.2 °F (36.8 °C)   Resp 18   Ht 5' 6" (1.676 m)   Wt 74.8 kg (165 lb)   " LMP 03/20/2018 (Approximate)   SpO2 98%   BMI 26.63 kg/m²   Pain Disability Index Review:  Last 3 PDI Scores 4/7/2022 10/13/2021 6/22/2021   Pain Disability Index (PDI) 23 28 6     Normocephalic.  Atraumatic.  Affect appropriate.  Breathing unlabored.  Extra ocular muscles intact.           Ortho/SPM Exam      Assessment:       Encounter Diagnoses   Name Primary?    Intractable migraine with aura without status migrainosus Yes    Intractable migraine without aura and without status migrainosus          Plan:   We discussed with the patient the assessment and recommendations. The following is the plan we agreed on:  1. Procedure:  Under clean technique, and after discussing with the patient we used 100 units of xeomin as recommended or allowed by her insurance company.  We injected the procerus in the midline,  bilaterally, frontalis, temporalis and splenius capitis bilaterally.  She tolerated procedure well.  2. Return as needed.  Otherwise follow-up in 1 month to see how it works.      Esme was seen today for headache.    Diagnoses and all orders for this visit:    Intractable migraine with aura without status migrainosus    Intractable migraine without aura and without status migrainosus

## 2022-04-18 DIAGNOSIS — I10 ESSENTIAL HYPERTENSION: ICD-10-CM

## 2022-04-18 RX ORDER — LOSARTAN POTASSIUM 50 MG/1
TABLET ORAL
Qty: 90 TABLET | Refills: 0 | Status: SHIPPED | OUTPATIENT
Start: 2022-04-18 | End: 2022-05-16 | Stop reason: SDUPTHER

## 2022-04-18 NOTE — TELEPHONE ENCOUNTER
Care Due:                  Date            Visit Type   Department     Provider  --------------------------------------------------------------------------------                                JENNIFER NEW                              PATIENT      [facility] INTERNAL  Last Visit: 09-      VISIT/[facility]   JOSÉ MANUEL RODRIGUEZ                              ANNUAL                              CHECKUP/PHY  [facility] INTERNAL  Next Visit: 06-      [facility]       Esme Costello                                                            Last  Test          Frequency    Reason                     Performed    Due Date  --------------------------------------------------------------------------------    CBC.........  12 months..  fenofibrate..............  Not Found    Overdue    CMP.........  12 months..  fenofibrate, losartan,     10-   10-                             pravastatin..............    Lipid Panel.  12 months..  fenofibrate, pravastatin.  10-   10-    TSH.........  12 months..  levothyroxine............  08- 08-    Powered by [employer]. Reference number: [id number].   4/18/2022 7:46:00 AM CDT

## 2022-04-18 NOTE — TELEPHONE ENCOUNTER
Refill Routing Note   Medication(s) are not appropriate for processing by [facility] for the following reason(s):      - Patient has not been seen in over 15 months by PCP    [facility] action(s):  Defer Medication-related problems identified: Requires labs     Medication Therapy Plan: NOV 6/16/2022; Labs (CBC, CMP, lipid panel, TSH) overdue  Medication reconciliation completed: No     Appointments  past 12m or future 3m with PCP    Date Provider   Last Visit   9/22/2020 Esme Costello MD   Next Visit   6/16/2022 Esme Costello MD   ED visits in past 90 days: 0        Note composed:5:57 PM 04/18/2022

## 2022-04-23 ENCOUNTER — PATIENT MESSAGE (OUTPATIENT)
Dept: INTERNAL MEDICINE | Facility: CLINIC | Age: 52
End: 2022-04-23
Payer: COMMERCIAL

## 2022-04-23 DIAGNOSIS — M54.2 NECK PAIN: Primary | ICD-10-CM

## 2022-05-09 ENCOUNTER — PATIENT MESSAGE (OUTPATIENT)
Dept: INTERNAL MEDICINE | Facility: CLINIC | Age: 52
End: 2022-05-09
Payer: COMMERCIAL

## 2022-05-09 DIAGNOSIS — E03.9 HYPOTHYROIDISM, UNSPECIFIED TYPE: ICD-10-CM

## 2022-05-09 DIAGNOSIS — I10 ESSENTIAL HYPERTENSION: ICD-10-CM

## 2022-05-16 RX ORDER — LOSARTAN POTASSIUM 50 MG/1
50 TABLET ORAL DAILY
Qty: 90 TABLET | Refills: 0 | Status: SHIPPED | OUTPATIENT
Start: 2022-05-16 | End: 2022-05-19 | Stop reason: SDUPTHER

## 2022-05-16 RX ORDER — PRAVASTATIN SODIUM 40 MG/1
40 TABLET ORAL DAILY
Qty: 90 TABLET | Refills: 0 | Status: SHIPPED | OUTPATIENT
Start: 2022-05-16 | End: 2022-08-27 | Stop reason: SDUPTHER

## 2022-05-16 RX ORDER — LEVOTHYROXINE SODIUM 100 UG/1
100 TABLET ORAL DAILY
Qty: 90 TABLET | Refills: 0 | Status: SHIPPED | OUTPATIENT
Start: 2022-05-16 | End: 2022-05-19 | Stop reason: SDUPTHER

## 2022-05-16 NOTE — TELEPHONE ENCOUNTER
Refill Routing Note   Medication(s) are not appropriate for processing by [facility] for the following reason(s):      - Patient has not been seen in over 15 months by PCP  - Required laboratory values are outdated    [facility] action(s):  Defer       Medication Therapy Plan: FOV;  Medication reconciliation completed: No     Appointments  past 12m or future 3m with PCP    Date Provider   Last Visit   9/22/2020 Esme Costello MD   Next Visit   5/19/2022 Esme Costello MD   ED visits in past 90 days: 0        Note composed:12:27 PM 05/16/2022

## 2022-05-19 ENCOUNTER — OFFICE VISIT (OUTPATIENT)
Dept: INTERNAL MEDICINE | Facility: CLINIC | Age: 52
End: 2022-05-19
Payer: COMMERCIAL

## 2022-05-19 ENCOUNTER — OFFICE VISIT (OUTPATIENT)
Dept: OBSTETRICS AND GYNECOLOGY | Facility: CLINIC | Age: 52
End: 2022-05-19
Attending: OBSTETRICS & GYNECOLOGY
Payer: COMMERCIAL

## 2022-05-19 VITALS
HEIGHT: 66 IN | WEIGHT: 165.81 LBS | HEIGHT: 66 IN | OXYGEN SATURATION: 99 % | DIASTOLIC BLOOD PRESSURE: 72 MMHG | BODY MASS INDEX: 26.58 KG/M2 | DIASTOLIC BLOOD PRESSURE: 94 MMHG | BODY MASS INDEX: 26.65 KG/M2 | WEIGHT: 165.38 LBS | HEART RATE: 93 BPM | SYSTOLIC BLOOD PRESSURE: 132 MMHG | SYSTOLIC BLOOD PRESSURE: 130 MMHG

## 2022-05-19 DIAGNOSIS — E66.3 OVERWEIGHT (BMI 25.0-29.9): ICD-10-CM

## 2022-05-19 DIAGNOSIS — R10.2 PELVIC PAIN: ICD-10-CM

## 2022-05-19 DIAGNOSIS — I10 ESSENTIAL HYPERTENSION: Primary | ICD-10-CM

## 2022-05-19 DIAGNOSIS — Z13.1 ENCOUNTER FOR SCREENING FOR DIABETES MELLITUS: ICD-10-CM

## 2022-05-19 DIAGNOSIS — M85.80 OSTEOPENIA, UNSPECIFIED LOCATION: ICD-10-CM

## 2022-05-19 DIAGNOSIS — M05.79 RHEUMATOID ARTHRITIS WITH RHEUMATOID FACTOR OF MULTIPLE SITES WITHOUT ORGAN OR SYSTEMS INVOLVEMENT: ICD-10-CM

## 2022-05-19 DIAGNOSIS — E78.2 MIXED HYPERLIPIDEMIA: ICD-10-CM

## 2022-05-19 DIAGNOSIS — N80.9 ENDOMETRIOSIS: Primary | ICD-10-CM

## 2022-05-19 DIAGNOSIS — Z01.419 ENCOUNTER FOR GYNECOLOGICAL EXAMINATION (GENERAL) (ROUTINE) WITHOUT ABNORMAL FINDINGS: ICD-10-CM

## 2022-05-19 DIAGNOSIS — K29.70 GASTRITIS WITHOUT BLEEDING, UNSPECIFIED CHRONICITY, UNSPECIFIED GASTRITIS TYPE: ICD-10-CM

## 2022-05-19 DIAGNOSIS — E03.9 HYPOTHYROIDISM, UNSPECIFIED TYPE: ICD-10-CM

## 2022-05-19 DIAGNOSIS — E28.319 PREMATURE MENOPAUSE: ICD-10-CM

## 2022-05-19 DIAGNOSIS — Z00.00 ANNUAL PHYSICAL EXAM: ICD-10-CM

## 2022-05-19 PROBLEM — L81.9 DISCOLORATION OF SKIN: Status: RESOLVED | Noted: 2019-03-02 | Resolved: 2022-05-19

## 2022-05-19 PROCEDURE — 1160F PR REVIEW ALL MEDS BY PRESCRIBER/CLIN PHARMACIST DOCUMENTED: ICD-10-PCS | Mod: CPTII,S$GLB,, | Performed by: INTERNAL MEDICINE

## 2022-05-19 PROCEDURE — 3080F DIAST BP >= 90 MM HG: CPT | Mod: CPTII,S$GLB,, | Performed by: OBSTETRICS & GYNECOLOGY

## 2022-05-19 PROCEDURE — 99999 PR PBB SHADOW E&M-EST. PATIENT-LVL IV: CPT | Mod: PBBFAC,,, | Performed by: INTERNAL MEDICINE

## 2022-05-19 PROCEDURE — 3078F DIAST BP <80 MM HG: CPT | Mod: CPTII,S$GLB,, | Performed by: INTERNAL MEDICINE

## 2022-05-19 PROCEDURE — 99215 OFFICE O/P EST HI 40 MIN: CPT | Mod: S$GLB,,, | Performed by: INTERNAL MEDICINE

## 2022-05-19 PROCEDURE — 3075F SYST BP GE 130 - 139MM HG: CPT | Mod: CPTII,S$GLB,, | Performed by: OBSTETRICS & GYNECOLOGY

## 2022-05-19 PROCEDURE — 4010F ACE/ARB THERAPY RXD/TAKEN: CPT | Mod: CPTII,S$GLB,, | Performed by: INTERNAL MEDICINE

## 2022-05-19 PROCEDURE — 4010F PR ACE/ARB THEARPY RXD/TAKEN: ICD-10-PCS | Mod: CPTII,S$GLB,, | Performed by: OBSTETRICS & GYNECOLOGY

## 2022-05-19 PROCEDURE — 1160F RVW MEDS BY RX/DR IN RCRD: CPT | Mod: CPTII,S$GLB,, | Performed by: INTERNAL MEDICINE

## 2022-05-19 PROCEDURE — 3078F PR MOST RECENT DIASTOLIC BLOOD PRESSURE < 80 MM HG: ICD-10-PCS | Mod: CPTII,S$GLB,, | Performed by: INTERNAL MEDICINE

## 2022-05-19 PROCEDURE — 99999 PR PBB SHADOW E&M-EST. PATIENT-LVL IV: ICD-10-PCS | Mod: PBBFAC,,, | Performed by: INTERNAL MEDICINE

## 2022-05-19 PROCEDURE — 99396 PREV VISIT EST AGE 40-64: CPT | Mod: S$GLB,,, | Performed by: OBSTETRICS & GYNECOLOGY

## 2022-05-19 PROCEDURE — 3008F PR BODY MASS INDEX (BMI) DOCUMENTED: ICD-10-PCS | Mod: CPTII,S$GLB,, | Performed by: INTERNAL MEDICINE

## 2022-05-19 PROCEDURE — 3008F BODY MASS INDEX DOCD: CPT | Mod: CPTII,S$GLB,, | Performed by: INTERNAL MEDICINE

## 2022-05-19 PROCEDURE — 99999 PR PBB SHADOW E&M-EST. PATIENT-LVL IV: ICD-10-PCS | Mod: PBBFAC,,, | Performed by: OBSTETRICS & GYNECOLOGY

## 2022-05-19 PROCEDURE — 1159F MED LIST DOCD IN RCRD: CPT | Mod: CPTII,S$GLB,, | Performed by: INTERNAL MEDICINE

## 2022-05-19 PROCEDURE — 4010F PR ACE/ARB THEARPY RXD/TAKEN: ICD-10-PCS | Mod: CPTII,S$GLB,, | Performed by: INTERNAL MEDICINE

## 2022-05-19 PROCEDURE — 3075F PR MOST RECENT SYSTOLIC BLOOD PRESS GE 130-139MM HG: ICD-10-PCS | Mod: CPTII,S$GLB,, | Performed by: INTERNAL MEDICINE

## 2022-05-19 PROCEDURE — 3080F PR MOST RECENT DIASTOLIC BLOOD PRESSURE >= 90 MM HG: ICD-10-PCS | Mod: CPTII,S$GLB,, | Performed by: OBSTETRICS & GYNECOLOGY

## 2022-05-19 PROCEDURE — 1159F PR MEDICATION LIST DOCUMENTED IN MEDICAL RECORD: ICD-10-PCS | Mod: CPTII,S$GLB,, | Performed by: INTERNAL MEDICINE

## 2022-05-19 PROCEDURE — 3008F PR BODY MASS INDEX (BMI) DOCUMENTED: ICD-10-PCS | Mod: CPTII,S$GLB,, | Performed by: OBSTETRICS & GYNECOLOGY

## 2022-05-19 PROCEDURE — 3075F PR MOST RECENT SYSTOLIC BLOOD PRESS GE 130-139MM HG: ICD-10-PCS | Mod: CPTII,S$GLB,, | Performed by: OBSTETRICS & GYNECOLOGY

## 2022-05-19 PROCEDURE — 4010F ACE/ARB THERAPY RXD/TAKEN: CPT | Mod: CPTII,S$GLB,, | Performed by: OBSTETRICS & GYNECOLOGY

## 2022-05-19 PROCEDURE — 99215 PR OFFICE/OUTPT VISIT, EST, LEVL V, 40-54 MIN: ICD-10-PCS | Mod: S$GLB,,, | Performed by: INTERNAL MEDICINE

## 2022-05-19 PROCEDURE — 3075F SYST BP GE 130 - 139MM HG: CPT | Mod: CPTII,S$GLB,, | Performed by: INTERNAL MEDICINE

## 2022-05-19 PROCEDURE — 99999 PR PBB SHADOW E&M-EST. PATIENT-LVL IV: CPT | Mod: PBBFAC,,, | Performed by: OBSTETRICS & GYNECOLOGY

## 2022-05-19 PROCEDURE — 3008F BODY MASS INDEX DOCD: CPT | Mod: CPTII,S$GLB,, | Performed by: OBSTETRICS & GYNECOLOGY

## 2022-05-19 PROCEDURE — 99396 PR PREVENTIVE VISIT,EST,40-64: ICD-10-PCS | Mod: S$GLB,,, | Performed by: OBSTETRICS & GYNECOLOGY

## 2022-05-19 RX ORDER — LEVOTHYROXINE SODIUM 100 UG/1
100 TABLET ORAL DAILY
Qty: 90 TABLET | Refills: 3 | Status: SHIPPED | OUTPATIENT
Start: 2022-05-19 | End: 2023-10-17 | Stop reason: SDUPTHER

## 2022-05-19 RX ORDER — LOSARTAN POTASSIUM 50 MG/1
50 TABLET ORAL DAILY
Qty: 90 TABLET | Refills: 3 | Status: SHIPPED | OUTPATIENT
Start: 2022-05-19 | End: 2023-07-31

## 2022-05-19 RX ORDER — FENOFIBRATE 160 MG/1
160 TABLET ORAL DAILY
Qty: 90 TABLET | Refills: 3 | Status: SHIPPED | OUTPATIENT
Start: 2022-05-19 | End: 2023-06-19 | Stop reason: SDUPTHER
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
## 2022-05-19 NOTE — PROGRESS NOTES
Subjective:       Patient ID: Esme Louise is a 52 y.o. female.    Chief Complaint:  Annual Exam (Last mammogram  birads: 1 (scheduled)/Last pap 2020 normal/Last hpv 2020 negative) and Hormones (Started on a low dose of estrogen and progesterone./Would like to get re checked for levels. )        History of Present Illness  Esme Louise is a 52 y.o. female  who presents for annual. Currently taking Estradiol 1 mg and prometrium 100 mg for POF. Says her hot flashes and night sweats are much better but still tired and has GI issues. Not sure if that is hormonal or has to do with menopause. Also has RA and other medical issues. Would like to check labs. Also reports decreased libido. Discussed possibly adding testosterone gel. Known h/o endometriosis. No periods due to POF      Patient's last menstrual period was 2018 (approximate).   Date of Last Pap: 2020    Review of Systems  Review of Systems   Constitutional: Negative for chills and fever.        Objective:   Physical Exam:   Constitutional: She is oriented to person, place, and time. Vital signs are normal. She appears well-developed and well-nourished. No distress.        Pulmonary/Chest: She exhibits no mass. Right breast exhibits no mass, no nipple discharge, no skin change, no tenderness, no bleeding and no swelling. Left breast exhibits no mass, no nipple discharge, no skin change, no tenderness, no bleeding and no swelling. Breasts are symmetrical.        Abdominal: Soft. Bowel sounds are normal. She exhibits no distension and no mass. There is no abdominal tenderness. There is no rebound.     Genitourinary:    Vagina normal.   There is no rash, tenderness, lesion or injury on the right labia. There is no rash, tenderness, lesion or injury on the left labia. Cervix is normal. Right adnexum displays no mass, no tenderness and no fullness. Left adnexum displays no mass, no tenderness and no fullness. No erythema,  no vaginal discharge,  tenderness, rectocele, cystocele or unspecified prolapse of vaginal walls in the vagina. Cervix exhibits no motion tenderness, no discharge and no friability. Uterus is enlarged (feels ??fibroids, fullness). Uterus is not deviated, not fixed, not tender and not hosting fibroids.           Musculoskeletal: Normal range of motion and moves all extremeties.      Lymphadenopathy:        Right: No supraclavicular adenopathy present.        Left: No supraclavicular adenopathy present.    Neurological: She is alert and oriented to person, place, and time.    Skin: Skin is warm and dry.    Psychiatric: She has a normal mood and affect. Her behavior is normal. Judgment normal.        Assessment/ Plan:     1. Endometriosis  US Pelvis Comp with Transvag NON-OB (xpd   2. Pelvic pain  US Pelvis Comp with Transvag NON-OB (xpd   3. Premature menopause  Estradiol    Progesterone    TESTOSTERONE   4. Encounter for gynecological examination (general) (routine) without abnormal findings       Will order GYN u/s for fullness of uterus on BME.     Follow up in about 1 year (around 5/19/2023) for Annual exam.    As of April 1, 2021, the Cures Act has been passed nationally. This new law requires that all doctors progress notes, lab results, pathology reports and radiology reports be released IMMEDIATELY to the patient in the patient portal. That means that the results are released to you at the EXACT same time they are released to me. Therefore, with all of the patients that I have I am not able to reply to each patient exactly when the results come in. So there will be a delay from when you see the results to when I see them and have time to come up with a response to send you. Also I only see these results when I am on the computer at work. So if the results come in over the weekend or after 5 pm of a work day, I will not see them until the next business day. As you can tell, this is a challenge as a physician to give every patient the  quick response they hope for and deserve. So please be patient! Thanks for understanding, Dr. Starr

## 2022-05-20 ENCOUNTER — HOSPITAL ENCOUNTER (OUTPATIENT)
Dept: RADIOLOGY | Facility: OTHER | Age: 52
Discharge: HOME | End: 2022-05-20
Attending: INTERNAL MEDICINE
Payer: COMMERCIAL

## 2022-05-20 DIAGNOSIS — Z12.31 OTHER SCREENING MAMMOGRAM: ICD-10-CM

## 2022-05-20 PROCEDURE — 77067 SCR MAMMO BI INCL CAD: CPT | Mod: 26,,, | Performed by: RADIOLOGY

## 2022-05-20 PROCEDURE — 77063 BREAST TOMOSYNTHESIS BI: CPT | Mod: 26,,, | Performed by: RADIOLOGY

## 2022-05-20 PROCEDURE — 77067 MAMMO DIGITAL SCREENING BILAT WITH TOMO: ICD-10-PCS | Mod: 26,,, | Performed by: RADIOLOGY

## 2022-05-20 PROCEDURE — 77063 BREAST TOMOSYNTHESIS BI: CPT | Mod: TC

## 2022-05-20 PROCEDURE — 77063 MAMMO DIGITAL SCREENING BILAT WITH TOMO: ICD-10-PCS | Mod: 26,,, | Performed by: RADIOLOGY

## 2022-05-23 ENCOUNTER — CLINICAL SUPPORT (OUTPATIENT)
Dept: REHABILITATION | Facility: OTHER | Age: 52
End: 2022-05-23
Attending: STUDENT IN AN ORGANIZED HEALTH CARE EDUCATION/TRAINING PROGRAM
Payer: COMMERCIAL

## 2022-05-23 DIAGNOSIS — M54.2 NECK PAIN: ICD-10-CM

## 2022-05-23 DIAGNOSIS — R29.3 POOR POSTURE: ICD-10-CM

## 2022-05-23 DIAGNOSIS — M53.82 NECK MUSCLE WEAKNESS: ICD-10-CM

## 2022-05-23 DIAGNOSIS — R29.898 WEAKNESS OF BOTH UPPER EXTREMITIES: ICD-10-CM

## 2022-05-23 DIAGNOSIS — R29.898 DECREASED RANGE OF MOTION OF NECK: ICD-10-CM

## 2022-05-23 PROCEDURE — 97162 PT EVAL MOD COMPLEX 30 MIN: CPT

## 2022-05-23 PROCEDURE — 97110 THERAPEUTIC EXERCISES: CPT

## 2022-05-24 ENCOUNTER — TELEPHONE (OUTPATIENT)
Dept: REHABILITATION | Facility: OTHER | Age: 52
End: 2022-05-24
Payer: COMMERCIAL

## 2022-05-24 NOTE — TELEPHONE ENCOUNTER
Left voicemail in an attempt to follow-up with pt after [facility] physical therapy evaluation, and to also provide information about health coaching offered in the program.

## 2022-05-25 ENCOUNTER — TELEPHONE (OUTPATIENT)
Dept: PAIN MEDICINE | Facility: CLINIC | Age: 52
End: 2022-05-25
Payer: COMMERCIAL

## 2022-05-25 PROBLEM — R29.898 DECREASED RANGE OF MOTION OF NECK: Status: ACTIVE | Noted: 2022-05-25

## 2022-05-25 PROBLEM — M53.82 NECK MUSCLE WEAKNESS: Status: ACTIVE | Noted: 2022-05-25

## 2022-05-25 PROBLEM — R29.898 WEAKNESS OF BOTH UPPER EXTREMITIES: Status: ACTIVE | Noted: 2022-05-25

## 2022-05-25 PROBLEM — R29.3 POOR POSTURE: Status: ACTIVE | Noted: 2022-05-25

## 2022-05-25 NOTE — PLAN OF CARE
"  [facility] - PHYSICAL THERAPY EVALUATION     Name: Esme Louise  [facility] Number: [id number]    Therapy Diagnosis:   Encounter Diagnoses   Name Primary?    Neck pain     Decreased range of motion of neck     Neck muscle weakness     Poor posture     Weakness of both upper extremities      Physician: Darrell Iqbal MD    Physician Orders: PT Eval and Treat   Medical Diagnosis from Referral: M54.2 (ICD-10-CM) - Neck pain  Evaluation Date: 5/23/2022  Authorization Period Expiration: 4/25/2023  Plan of Care Expiration: 8/23/2022  Reassessment Due: 6/23/2022  Visit # / Visits authorized: 1/ 1 (pending)    Time In: 8:10 am  Time Out: 9:30 am  Total Billable Time: 80 minutes    Precautions: Standard and migraines, RA    Pattern of pain determined: 1 ALCIRA      Subjective   Date of onset: 10+ years  History of current condition - Esme reports: she has been dealing with neck pain and migraines since adolescence. Around 2010 it became a progressively constant migraine. She saw a neurologist who determined that she has occipital neuralgia and cervical dystonia. She had a decompression surgery than made a big difference, but she would still have neck pain. She has been getting botox injections for the migraines for years with some relief. 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 She feels that she "doesn't know how to use her body without having pain in her neck or migraines." Aggravating factors include driving, looking over her shoulder, turning her head in either direction, lifting over 5 lbs, stress, cooking, doing laundry, standing for prolonged periods, household chores, bending forward. Alleviated with cyclobenzaprine, botox injections, occipital decompression surgery in 2012. She reports in the last 6 months she has developed calcifications in her R shoulder that has made it painful for her to sleep, get dressed, put on a bra, " and any sort of overhead movement.      Medical History:   Past Medical History:   Diagnosis Date    Anemia     Anxiety     Arthritis     Arthritis     Breast disorder     Depression     Endometriosis     Headaches, cluster     Hypertension     Mental disorder     Occipital neuralgia     Premature ovarian failure     Thyroid disease        Surgical History:   Esme Louise  has a past surgical history that includes Breast lumpectomy (Right); Oophorectomy (Right); Endometrial ablation; and Occipital nerve stimulator insertion (2015).    Medications:   Esme has a current medication list which includes the following prescription(s): abatacept/maltose, butalbital-acetaminophen-caff, clonazepam, cyclobenzaprine, diclofenac, estradiol, fenofibrate, levothyroxine, losartan, methotrexate, nuvigil, onabotulinumtoxina, pantoprazole, pravastatin, progesterone, propranolol, sodium chloride 0.9% SolP 100 mL with abatacept (with maltose) 250 mg SolR, tizanidine, viibryd, and zolmitriptan.    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  Leisure: arts and crafts with [employer], [employer]      Prior Level of Function: independent   Current Level of Function: increased pain and decreased tolerance to driving, looking over her shoulder, turning her head in either direction, lifting over 5 lbs, stress, cooking, doing laundry, standing for prolonged periods, household chores, bending forward  DME owned/used: none        Pain:  Current 0/10, worst 10/10, best 0/10   Location: bilateral neck, upper traps, occiput, and forehead   Description: Burning and Tight  Aggravating Factors: driving,  "looking over her shoulder, turning her head in either direction, lifting over 5 lbs, stress, cooking, doing laundry, standing for prolonged periods, household chores, bending forward  Easing Factors: cyclobenzaprine, botox injections, occipital decompression surgery  Disturbed Sleep: yes, it will wake me up in the middle of the night        Pattern of pain questions:  1.  Where is your pain the worst? Bilateral cervical paraspinals  2.  Is your pain constant or intermittent? intermittent  3.  Does bending forward make your typical pain worse? yes  4.  Since the start of your neck pain, has there been a change in your bowel or bladder? Yes, stomach issues, IBS like  5.  What can't you do now that you use to be able to do? Lifting anything heavy, any physical exertion more than walking      Pts goals: "I want to learn how to use my body physically so that I can exercise regularly without harming myself"      Red Flag Screening:   Cough  Sneeze  Strain: (+)  Bladder/ bowel: (+)  Falls: (--)  Night pain: (--)  Unexplained weight loss: (--)  General health: "Not great right now"    OBJECTIVE   Postural examination/scapula alignment: Rounded shoulder, Head forward, Slouched posture, Increased kyphosis and Decreased lordosis  Joint integrity: Hard end feel  Skin integrity: intact  Edema: none  Sitting: poor  Standing: poor  Correction of posture: no effect with lumbar roll    Range of Motion - MOVEMENT LOSS    ROM Loss   Flexion minimal loss   Extension minimal loss   Side bending Right major loss   Side bending Left major loss   Rotation Right moderate loss   Rotation Left moderate loss   Protraction minimal loss   Retraction  moderate loss       Upper Extremity Strength  (R) UE  (L) UE    Shoulder flexion: 4-/5 Shoulder flexion: 4+/5   Shoulder Abduction: 4-/5 Shoulder abduction: 4+/5   Elbow flexion: 5/5 Elbow flexion: 5/5   Elbow extension: 5/5 Elbow extension: 5/5   Wrist flexion: 5/5 Wrist flexion: 5/5   Wrist " "extension: 5/5 Wrist extension: 5/5    5/5 : 5/5     NEUROLOGICAL SCREEN    Sensory deficit: intact to light touch     Special Tests:   Test Name  Testing Result   Compression (--)   Distraction (+)   Neural Tension Test (--)   Saddle Sensation (--)     Reflexes:    Left Right   Biceps  2+ 2+   Brachioradialis  2+ 2+   Clonus (--) (--)   Babinski (--) (--)       REPEATED TEST MOVEMENTS:   Repeated Protraction in Sitting worse   Repeated Flexion in Sitting better   Repeated Retraction in Sitting  no better  no effect   Repeated Retraction Extension in Sitting no worse  no better   Repeated Protraction in lying worse   Repeated Flexion in lying worse   Repeated Retraction in lying better   Repeated Retraction Extension in lying no effect       Baseline Isometric Testing on Med X equipment:  Testing administered by PT  Date of testin2022  ROM  deg   Max Peak Torque 188    Min Peak Torque 57    Flex/Ext Ratio 3.3:1   % below normative data 29%       GAIT:  Assistive Device used: none  Level of Assistance: independent  Patient displays the following gait deviations:  no gait deviations observed.         Limitation/Restriction for FOTO neck Survey    Therapist reviewed FOTO scores for Esme Louise on 2022.   FOTO documents entered into [employer] - see Media section.    Limitation Score: 48%    Goal: 41%             Treatment   Treatment Time In: 9:00  Treatment Time Out: 9:30  Total Treatment time separate from Evaluation: 30 minutes      Esme received therapeutic exercises to develop/improve posture, lumbar/cervical ROM, strength and muscular endurance for 30 minutes including the following exercises:     Med x dynamic exercise and baseline IM testing    RIL 10x5"  RIS 10x5"  UT stretch 2x30"  LS stretch 2x30"  Scap retracts 15x3" - add RTB next visit    [facility] - Short 2022   Visit Number 1   VAS Pain Rating 0   Retraction in Lying 10   Retraction in Sitting 10   Scapular " Retraction 15   Cervical Extension - Seat Pad 0   Seat Adjustment 473   Top Dead Center 66   Counterweight 0.8   Cervical Flexion 108   Cervical Extension 30   Cervical Peak Torque 188   Cervical Weight 60   Repetitions 5         Esme received the following manual therapy techniques: none were applied to the: n/a for 0 minutes.         Written Home Exercises Provided: yes.  Exercises were reviewed and Esme was able to demonstrate them prior to the end of the session.  Esme demonstrated good  understanding of the education provided.     See EMR under Patient Instructions for exercises provided 5/23/2022.      Education provided:   - Patient received education regarding proper posture and body mechanics.  Patient was given top [facility] Visit 1 handouts which discuss what to expect in therapy, the purpose and opportunity for health coaching, the program,  wellness when discharged from therapy, back education and care specifically for posture seated, standing, lifting correctly, components of exercise, importance of nutrition and hydration, and importance of sleep.   Information on lumbar rolls provided.  Patient received education regarding proper posture and body mechanics.  - Thor roll tried, recommended, and purchase information was provided.    - Patient received a handout regarding anticipated muscular soreness following the isometric test and strategies for management were reviewed with patient including stretching, using ice and scheduled rest.   - Patient received education on the [facility] program, purpose of the isometric test, progression of neck strengthening as well as wellness approach and systemic strengthening.  Details of the program were discussed.  Reviewed that patient should feel support/pressure from med ex restraints but no pain or discomfort and patient expressed understanding.      Esme received cold pack for 10 minutes to cervical spine.    Assessment   Esme is a 52  y.o. female referred to [facility] with a medical diagnosis of M54.2 (ICD-10-CM) - Neck pain. Pt presents with signs and symptoms consistent with occipital neuralgia with cervicogenic headahces including decreased range of motion of cervical spine, weakness of neck musculature, poor posture, upper extremity weakness, decreased joint mobility, decreased soft tissue flexibility and mobility, decreased functional activity tolerance, and tenderness to palpation of cervical musculature. These deficits are limiting patient in full participation in ADL's and leisure activities such as driving, looking over her shoulder, turning her head in either direction, lifting over 5 lbs, stress, cooking, doing laundry, standing for prolonged periods, household chores, bending forward. Pt is 29% below normative values with medx cervical isometric strength testing.    Pain Pattern: 1 ALCIRA       Pt prognosis is Good.   Pt will benefit from skilled outpatient Physical Therapy to address the deficits stated above and in the chart below, provide pt/family education, and to maximize pt's level of independence.     Plan of care discussed with patient: Yes  Pt's spiritual, cultural and educational needs considered and patient is agreeable to the plan of care and goals as stated below:     Anticipated Barriers for therapy: Chronicity of condition, R shoulder calcifcations    PT Evaluation Completed? Yes    Medical necessity is demonstrated by the following problem list.    Pt presents with the following impairments:     History  Co-morbidities and personal factors that may impact the plan of care Co-morbidities:   anxiety, depression, difficulty sleeping, high BMI and prior neck surgery, migraines, PTSD, HTN, rheumatoid arthritis, hypothyroidism, occipital neuralgia     Personal Factors:   no deficits     moderate   Examination  Body Structures and Functions, activity limitations and participation restrictions that may impact the plan  of care Body Regions:   head  neck  upper extremities    Body Systems:    gross symmetry  ROM  strength  gross coordinated movement  motor control  blood pressure    Participation Restrictions:   See above    Activity limitations:   Learning and applying knowledge  no deficits    General Tasks and Commands  no deficits    Communication  no deficits    Mobility  lifting and carrying objects  walking  driving (bike, car, motorcycle)    Self care  no deficits    Domestic Life  cooking  doing house work (cleaning house, washing dishes, laundry)  assisting others    Interactions/Relationships  no deficits    Life Areas  no deficits    Community and Social Life  no deficits         moderate   Clinical Presentation stable and uncomplicated low   Decision Making/ Complexity Score: moderate       GOALS: Pt is in agreement with the following goals.    Short term goals: 6 weeks or 10 visits   1.  Pt will demonstratte increased cervical ROM as measured by med ex by 6 degrees from initial test which results in improved  ROM of neck for ease with ADLs and driving  (approp and ongoing)  2. Pt will demonstrate independence with reducing or controlling symptoms with ther ex, movement, or position independently, able to reduce pain 1-2 points on pain scale using strategies taught in therapy  (approp and ongoing)  3. Pt will demonstrate increased MedX average isometric strength value by 10% with  when compared to the initial testing resulting in improved ability to perform bending, lifting, and carrying activities safely, confidently.  (approp and ongoing)        Long term goals: 10 weeks or 20 visits  1. Pt will demonstratte increased cervical ROM as measured by med ex by 12 degrees from initial test which results in functional ROM of neck for ease with ADLs and driving  (approp and ongoing)  2. Pt will demonstrate increased MedX average isometric strength value  By 20% from initial test to improve ability to lift and carry, and  "sustain good posture while performing ADL's  (approp and ongoing)  3.Pt will demonstrate reduced pain and improved functional outcomes as reported on the FOTO by reaching a limitation score of < or = 41% or less in order to demonstrate subjective improvement in pt's condition.    (approp and ongoing)  4. Pt will demonstrate independence with reducing or controlling symptoms with ther ex, movement, or position independently, able to reduce pain 2-4 points on pain scale using strategies taught in therapy  (approp and ongoing)  5. Pt will demonstrate independence with the HEP at discharge.   (approp and ongoing)  6. Pt to be able to complete a full 8 hour workday without increased pain.  (approp and ongoing)    Plan   Outpatient physical therapy 2x week for 10 weeks or 20 visits to include the following:   - Patient education  - Therapeutic exercise  - Manual therapy  - Performance testing   - Neuromuscular Re-education  - Therapeutic activity   - Modalities    Pt may be seen by PTA as part of the rehabilitation team.     Therapist: Magan Trivedi, PT  5/25/2022      "I certify the need for these services furnished under this plan of treatment and while under my care."    ____________________________________  Physician/Referring Practitioner    _______________  Date of Signature          "

## 2022-05-25 NOTE — TELEPHONE ENCOUNTER
Good morning      This is an appointment reminder about your schedule Virtual Visit with Pain Management Provider Mai Akbar MD.   Your appointment is for 05 26, 2022 at 1:30pm.     Please log into your [facility] 15 min's prior to your appointment time to e-precheck, verify all corresponding pages, and troubleshoot any problems that may occur. Once your device has been verify as compatible, and you receive the green check,  you must hit/ select the start visit button, This will notify your provider that you are ready to start the Virtual Video Visit.     As [facility] is a teaching \[facility]\"", your visit may be started with a resident or a fellow that is rounding in clinic, then proceeded by your physician.    Once logged on, please allow the provider 20 -30 mins to check-in, in case running behind.       If you experience any technical issue please contact [phone]        Thank You for entrusting [facility] to handle your care     Patient verbalized and confirmed appt

## 2022-05-26 ENCOUNTER — OFFICE VISIT (OUTPATIENT)
Dept: PAIN MEDICINE | Facility: CLINIC | Age: 52
End: 2022-05-26
Attending: ANESTHESIOLOGY
Payer: COMMERCIAL

## 2022-05-26 ENCOUNTER — CLINICAL SUPPORT (OUTPATIENT)
Dept: REHABILITATION | Facility: OTHER | Age: 52
End: 2022-05-26
Attending: STUDENT IN AN ORGANIZED HEALTH CARE EDUCATION/TRAINING PROGRAM
Payer: COMMERCIAL

## 2022-05-26 DIAGNOSIS — R29.898 DECREASED RANGE OF MOTION OF NECK: Primary | ICD-10-CM

## 2022-05-26 DIAGNOSIS — R29.3 POOR POSTURE: ICD-10-CM

## 2022-05-26 DIAGNOSIS — G43.119 INTRACTABLE MIGRAINE WITH AURA WITHOUT STATUS MIGRAINOSUS: Primary | ICD-10-CM

## 2022-05-26 DIAGNOSIS — R29.898 WEAKNESS OF BOTH UPPER EXTREMITIES: ICD-10-CM

## 2022-05-26 DIAGNOSIS — G24.3 ISOLATED CERVICAL DYSTONIA: ICD-10-CM

## 2022-05-26 DIAGNOSIS — M62.838 MUSCLE SPASM: ICD-10-CM

## 2022-05-26 DIAGNOSIS — M53.82 NECK MUSCLE WEAKNESS: ICD-10-CM

## 2022-05-26 PROCEDURE — 97110 THERAPEUTIC EXERCISES: CPT | Mod: CQ

## 2022-05-26 PROCEDURE — 1159F PR MEDICATION LIST DOCUMENTED IN MEDICAL RECORD: ICD-10-PCS | Mod: CPTII,95,, | Performed by: ANESTHESIOLOGY

## 2022-05-26 PROCEDURE — 4010F PR ACE/ARB THEARPY RXD/TAKEN: ICD-10-PCS | Mod: CPTII,95,, | Performed by: ANESTHESIOLOGY

## 2022-05-26 PROCEDURE — 99213 PR OFFICE/OUTPT VISIT, EST, LEVL III, 20-29 MIN: ICD-10-PCS | Mod: 95,,, | Performed by: ANESTHESIOLOGY

## 2022-05-26 PROCEDURE — 4010F ACE/ARB THERAPY RXD/TAKEN: CPT | Mod: CPTII,95,, | Performed by: ANESTHESIOLOGY

## 2022-05-26 PROCEDURE — 3044F HG A1C LEVEL LT 7.0%: CPT | Mod: CPTII,95,, | Performed by: ANESTHESIOLOGY

## 2022-05-26 PROCEDURE — 1160F PR REVIEW ALL MEDS BY PRESCRIBER/CLIN PHARMACIST DOCUMENTED: ICD-10-PCS | Mod: CPTII,95,, | Performed by: ANESTHESIOLOGY

## 2022-05-26 PROCEDURE — 1160F RVW MEDS BY RX/DR IN RCRD: CPT | Mod: CPTII,95,, | Performed by: ANESTHESIOLOGY

## 2022-05-26 PROCEDURE — 3044F PR MOST RECENT HEMOGLOBIN A1C LEVEL <7.0%: ICD-10-PCS | Mod: CPTII,95,, | Performed by: ANESTHESIOLOGY

## 2022-05-26 PROCEDURE — 1159F MED LIST DOCD IN RCRD: CPT | Mod: CPTII,95,, | Performed by: ANESTHESIOLOGY

## 2022-05-26 PROCEDURE — 99213 OFFICE O/P EST LOW 20 MIN: CPT | Mod: 95,,, | Performed by: ANESTHESIOLOGY

## 2022-05-26 NOTE — PROGRESS NOTES
Chronic Pain-Tele-Medicine-Established Note (Follow up visit)      The patient location is: Home  The chief complaint leading to consultation is: Headaches  Visit type: Virtual visit with synchronous audio and video  Total time spent with patient: 20 mins  Each patient to whom he or she provides medical services by telemedicine is:  (1) informed of the relationship between the physician and patient and the respective role of any other health care provider with respect to management of the patient; and (2) notified that he or she may decline to receive medical services by telemedicine and may withdraw from such care at any time.    Notes:     SUBJECTIVE:    Esme Louise presents tele-medicine appointment for a follow-up appointment for Headaches. Since the last visit, Esme Louise states the pain has been worsening. Current pain intensity is 3/10. Pt is 6 weeks s/p 100 units of xeomin injection. Pt report that her pain worsened after the injection and that lasted for over 4 weeks and her pain is only just now started getting better. Pt used to receive 150 units of Botox- would waste 50 units. She has tried Dysport 300 units in the past but that gave her very short relief. Plan to attempt to get insurance authorization for repeat Botox injections.        Pain Medications:    - Opioids: none  - NSAIDs: Diclofenac   - Anti-Depressants: none  - Anti-Convulsants: none  - Others: tizanidine      Physical Therapy/Home Exercise: no       report:  Not applicable    Pain Procedures: Xeomin injection for migraines         Past Medical History:   Diagnosis Date    Anemia     Anxiety     Arthritis     Arthritis     Breast disorder     Depression     Endometriosis     Headaches, cluster     Hypertension     Mental disorder     Occipital neuralgia     Premature ovarian failure     Thyroid disease      Past Surgical History:   Procedure Laterality Date    BREAST LUMPECTOMY Right     benign     ENDOMETRIAL ABLATION       endometriosis     OCCIPITAL NERVE STIMULATOR INSERTION  2015    OOPHORECTOMY Right     d/t endometriosis      Social History     Socioeconomic History    Marital status:    Occupational History     Comment: search engine optimization    Tobacco Use    Smoking status: Former Smoker     Packs/day: 0.75     Years: 5.00     Pack years: 3.75     Types: Cigarettes     Quit date: 2006     Years since quittin.4    Smokeless tobacco: Never Used    Tobacco comment: smoke cloves in the past   Substance and Sexual Activity    Alcohol use: Yes     Comment: worsen migraines     Drug use: No    Sexual activity: Yes     Partners: Female     Birth control/protection: None     Family History   Problem Relation Age of Onset    Autoimmune disease Maternal Uncle     Diabetes Maternal Grandmother     Miscarriages / Stillbirths Maternal Grandmother     Colon cancer Maternal Grandfather 70    Schizophrenia Father     Suicide Father     Breast cancer Neg Hx     Eclampsia Neg Hx     Hypertension Neg Hx     Ovarian cancer Neg Hx        Review of patient's allergies indicates:   Allergen Reactions    Pcn [penicillins] Swelling    Zoloft [sertraline] Hives and Swelling       Current Outpatient Medications   Medication Sig    abatacept/maltose (ORENCIA, WITH MALTOSE, IV) Inject into the vein.    butalbital-acetaminophen-caff -40 mg Cap TK ONE C PO Q 4 HOURS    clonazePAM (KLONOPIN) 1 MG tablet     cyclobenzaprine (AMRIX) 15 MG 24 hr capsule TAKE 1 CAPSULE BY MOUTH DAILY AT 6 IN THE EVENING compounded especially for you by the pharmacist    diclofenac (VOLTAREN) 50 MG EC tablet TK 1 T PO BID    estradioL (ESTRACE) 1 MG tablet Take 1 tablet (1 mg total) by mouth once daily.    fenofibrate 160 MG Tab Take 1 tablet (160 mg total) by mouth once daily.    levothyroxine (SYNTHROID) 100 MCG tablet Take 1 tablet (100 mcg total) by mouth once daily.    losartan (COZAAR) 50 MG tablet Take 1 tablet (50  mg total) by mouth once daily.    methotrexate 2.5 MG Tab Take by mouth every 7 days.    NUVIGIL 250 mg tablet TK 1 T PO QD    onabotulinumtoxinA (BOTOX INJ) Inject as directed.    pantoprazole (PROTONIX) 20 MG tablet Take 1 tablet (20 mg total) by mouth once daily.    pravastatin (PRAVACHOL) 40 MG tablet Take 1 tablet (40 mg total) by mouth once daily.    progesterone (PROMETRIUM) 100 MG capsule Take 1 capsule (100 mg total) by mouth once daily.    propranolol (INDERAL) 10 MG tablet     sodium chloride 0.9% SolP 100 mL with abatacept (with maltose) 250 mg SolR abatacept   750MG 1X MONTH    tizanidine 2 mg Cap Take 2 mg by mouth as needed.    VIIBRYD 10 mg Tab tablet Take 10 mg by mouth once daily.    zolmitriptan (ZOMIG) 5 MG tablet TK 1 T PO  ONCE PRN HA     No current facility-administered medications for this visit.       REVIEW OF SYSTEMS:    GENERAL:  No weight loss, malaise or fevers.  HEENT:   No recent changes in vision or hearing  NECK:  Negative for lumps, no difficulty with swallowing.  RESPIRATORY:  Negative for cough, wheezing or shortness of breath, patient denies any recent URI.  CARDIOVASCULAR:  Negative for chest pain, leg swelling or palpitations.  GI:  Negative for abdominal discomfort, blood in stools or black stools or change in bowel habits.  MUSCULOSKELETAL:  See HPI.  SKIN:  Negative for lesions, rash, and itching.  PSYCH:  No mood disorder or recent psychosocial stressors.  Patients sleep is not disturbed secondary to pain.  HEMATOLOGY/LYMPHOLOGY:  Negative for prolonged bleeding, bruising easily or swollen nodes.  Patient is not currently taking any anti-coagulants  NEURO:   No history of headaches, syncope, paralysis, seizures or tremors.  All other reviewed and negative other than HPI.    OBJECTIVE:    General appearance: Well appearing, in no acute distress, alert and oriented x3.  Psych:  Mood and affect appropriate.      ASSESSMENT: 52 y.o. year old female with headaches  pain, consistent with     1. Intractable migraine with aura without status migrainosus     2. Muscle spasm     3. Isolated cervical dystonia           PLAN:   We discussed with the patient the assessment and recommendations. The following is the plan we agreed on:   - I have stressed the importance of physical activity and a home exercise plan to help with pain and improve health.  - Patient can continue with medications for now since they are providing benefits, using them appropriately, and without side effects.  - RTC in 6 weeks for 150 units of Botox injections  - Counseled patient regarding the importance of activity modification.    The above plan and management options were discussed at length with patient. Patient is in agreement with the above and verbalized understanding. It will be communicated with the referring physician via electronic record, fax, or mail.    Alejandro Segura  05/26/2022  I have personally taken the history and examined this patient and agree with the resident's note as stated above.

## 2022-05-26 NOTE — PROGRESS NOTES
"[facility]      Name: Esme Louise  Clinic Number: [id number]    Therapy Diagnosis:   Encounter Diagnoses   Name Primary?    Decreased range of motion of neck Yes    Neck muscle weakness     Poor posture     Weakness of both upper extremities      Physician: Darrell Iqbal MD    Visit Date: 2022      Physician Orders: PT Eval and Treat   Medical Diagnosis from Referral: M54.2 (ICD-10-CM) - Neck pain  Evaluation Date: 2022  Authorization Period Expiration: 2023  Plan of Care Expiration: 2022  Reassessment Due: 2022  Visit # / Visits authorized:      Time In: 7:31  Time Out: 8:21  Total Billable Time: 40 minutes    Precautions: Standard and migraines, RA    Pattern of pain determined: 1 ALCIRA    Subjective   Esme reports following last visit she "felt a little twinge along that occipital nerve".  She reports it did not cause a migraine.     Patient reports tolerating previous visit fair with increase soreness with nerve pain  Patient reports their pain to be 2/10 on a 0-10 scale with 0 being no pain and 10 being the worst pain imaginable.  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   FOTO documents entered into [employer] - see Media section.     Limitation Score: 48%     Goal: 41%            Treatment    Pt was instructed in and performed the following:     Esme received therapeutic exercises to " "develop/improved posture, cardiovascular endurance, muscular endurance, lumbar/cervical ROM, strength and muscular endurance for 40 minutes including the following exercises:       RIL 10x5"  RIS 10x5"  UT stretch 2x30"  LS stretch 2x30"  Scap retracts 15x3" - add RTB next visit    [facility] 5/26/2022   Visit Number 2   VAS Pain Rating 2   Time 5   Retraction in Lying 10   Retraction in Sitting 10   Scapular Retraction 15   Manual Therapy 5   Cervical Extension - Seat Pad -   Seat Adjustment -   Top Dead Center -   Counterweight -   Cervical Flexion -   Cervical Extension -   Cervical Peak Torque -   Cervical Weight 75   Repetitions 15   Rating of Perceived Exertion 3       Peripheral muscle strengthening which included 1 set of 15-20 repetitions at a slow, controlled 10-13 second per rep pace focused on strengthening supporting musculature for improved body mechanics and functional mobility.  Pt and therapist focused on proper form during treatment to ensure optimal strengthening of each targeted muscle group.  Machines were utilized including torso rotation, leg extension, leg curl, chest press, upright row. Tricep extension, bicep curl, leg press, and hip abduction added visit 3    Esme received the following manual therapy techniques:Suboccipital release was applied  for 5 minutes.         Home Exercises Provided and Patient Education Provided   Home exercises include: RIL, RIS, UT stretch, LS stretch, Scap retractions  Cardio program: Visit 5  Lifting education date: Visit 11  Lumbar roll: TBD    Education provided:   - Pacing on Medx  - HEP  - Purpose of peripheral machines    Written Home Exercises Provided: Patient instructed to cont prior HEP.  Exercises were reviewed and Esme was able to demonstrate them prior to the end of the session.  Esme demonstrated good  understanding of the education provided.     See EMR under Patient Instructions for exercises provided prior " visit.          Assessment   Esme returned for her first follow up visit reporting minimal cervical pain. After the initial evaluation she experienced an increase in occipital nerve pain, but she reports she didn't experience any migraines.  Treatment began with HEP review with Esme demonstrating a good understanding of exercises needing only minimal VC for proper form.  Medx resistance began at less than half of max torque due to high flex/ext ratio.  She completed 15 reps at a RPE of 3/10.  Will continue to progress per pt's tolerance and HB protocol.  First 1/2 of peripheral machines completed with no adverse effects.    Patient is making good progress towards established goals.  Pt will continue to benefit from skilled outpatient physical therapy to address the deficits stated in the impairment chart, provide pt/family education and to maximize pt's level of independence in the home and community environment.     Anticipated Barriers for therapy: Chronicity of condition, R shoulder calcifcations    Pt's spiritual, cultural and educational needs considered and pt agreeable to plan of care and goals as stated below:             Goals:   Short term goals: 6 weeks or 10 visits   1.  Pt will demonstratte increased cervical ROM as measured by med ex by 6 degrees from initial test which results in improved  ROM of neck for ease with ADLs and driving  (approp and ongoing)  2. Pt will demonstrate independence with reducing or controlling symptoms with ther ex, movement, or position independently, able to reduce pain 1-2 points on pain scale using strategies taught in therapy  (approp and ongoing)  3. Pt will demonstrate increased MedX average isometric strength value by 10% with  when compared to the initial testing resulting in improved ability to perform bending, lifting, and carrying activities safely, confidently.  (approp and ongoing)           Long term goals: 10 weeks or 20 visits  1. Pt will demonstratte  increased cervical ROM as measured by med ex by 12 degrees from initial test which results in functional ROM of neck for ease with ADLs and driving  (approp and ongoing)  2. Pt will demonstrate increased MedX average isometric strength value  By 20% from initial test to improve ability to lift and carry, and sustain good posture while performing ADL's  (approp and ongoing)  3.Pt will demonstrate reduced pain and improved functional outcomes as reported on the FOTO by reaching a limitation score of < or = 41% or less in order to demonstrate subjective improvement in pt's condition.    (approp and ongoing)  4. Pt will demonstrate independence with reducing or controlling symptoms with ther ex, movement, or position independently, able to reduce pain 2-4 points on pain scale using strategies taught in therapy  (approp and ongoing)  5. Pt will demonstrate independence with the HEP at discharge.   (approp and ongoing)  6. Pt to be able to complete a full 8 hour workday without increased pain.  (approp and ongoing)      Plan   Continue with established Plan of Care towards established PT goals.

## 2022-05-30 ENCOUNTER — CLINICAL SUPPORT (OUTPATIENT)
Dept: REHABILITATION | Facility: OTHER | Age: 52
End: 2022-05-30
Attending: STUDENT IN AN ORGANIZED HEALTH CARE EDUCATION/TRAINING PROGRAM
Payer: COMMERCIAL

## 2022-05-30 DIAGNOSIS — R29.3 POOR POSTURE: ICD-10-CM

## 2022-05-30 DIAGNOSIS — R29.898 WEAKNESS OF BOTH UPPER EXTREMITIES: ICD-10-CM

## 2022-05-30 DIAGNOSIS — R29.898 DECREASED RANGE OF MOTION OF NECK: Primary | ICD-10-CM

## 2022-05-30 DIAGNOSIS — M53.82 NECK MUSCLE WEAKNESS: ICD-10-CM

## 2022-05-30 PROCEDURE — 97110 THERAPEUTIC EXERCISES: CPT

## 2022-05-30 NOTE — PROGRESS NOTES
"[facility]      Name: Esme Louise  Clinic Number: [id number]    Therapy Diagnosis:   Encounter Diagnoses   Name Primary?    Decreased range of motion of neck Yes    Neck muscle weakness     Poor posture     Weakness of both upper extremities      Physician: Darrell Iqbal MD    Visit Date: 2022      Physician Orders: PT Eval and Treat   Medical Diagnosis from Referral: M54.2 (ICD-10-CM) - Neck pain  Evaluation Date: 2022  Authorization Period Expiration: 2023  Plan of Care Expiration: 2022  Visit # / Visits authorized: 3/ 20     Time In: 1:20  Time Out: 2:20  Total Billable Time: 60 minutes    Precautions: Standard and migraines, RA    Pattern of pain determined: 1 ALCIRA    Subjective   Esme reports following last visit she again felt a little twinge along her occipital nerve but less so, and the weight was fairly easy but she's still learning what she is doing.     Patient reports tolerating previous visit fair with increase soreness with nerve pain  Patient reports their pain to be 2/10 on a 0-10 scale with 0 being no pain and 10 being the worst pain imaginable.  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   FOTO documents entered into [employer] - see Media section.     Limitation Score: 48%     Goal: 41%            Treatment    Pt was instructed in and performed the following:     Esme received " "therapeutic exercises to develop/improved posture, cardiovascular endurance, muscular endurance, lumbar/cervical ROM, strength and muscular endurance for 40 minutes including the following exercises:         RIS 10x5"  RIS + rotation x 10 ea  UT stretch 2x30"  LS stretch 2x30"  Scap retracts 15x3" - add RTB next visit    [facility] 5/30/2022   Visit Number 3   VAS Pain Rating 2   Time 5   Retraction in Lying -   Retraction in Sitting 10   Flexion 10   Sidebending 10   Scapular Retraction 10   Manual Therapy 5   Cervical Extension - Seat Pad -   Seat Adjustment -   Top Dead Center -   Counterweight -   Cervical Flexion -   Cervical Extension -   Cervical Peak Torque -   Cervical Weight 75   Repetitions 20   Rating of Perceived Exertion 3           Peripheral muscle strengthening which included 1 set of 15-20 repetitions at a slow, controlled 10-13 second per rep pace focused on strengthening supporting musculature for improved body mechanics and functional mobility.  Pt and therapist focused on proper form during treatment to ensure optimal strengthening of each targeted muscle group.  Machines were utilized including torso rotation, leg extension, leg curl, chest press, upright row. Tricep extension, bicep curl, leg press, and hip abduction added visit 3    Esme received the following manual therapy techniques: Suboccipital release, TpR to R UT and STM was applied  for 5 minutes.         Home Exercises Provided and Patient Education Provided   Home exercises include: RIL, RIS, UT stretch, LS stretch, Scap retractions  Cardio program: Visit 5  Lifting education date: Visit 11  Lumbar roll: TBD    Education provided:   - Pacing on Medx  - Addition of massage and other modalities regularly to try and decrease overall sensitivity of cervical musculature    Written Home Exercises Provided: Patient instructed to cont prior HEP.  Exercises were reviewed and Esme was able to demonstrate them prior to the " end of the session.  Esme demonstrated good  understanding of the education provided.     See EMR under Patient Instructions for exercises provided prior visit.          Assessment   Esme returned for her second follow up visit reporting continued and decreased but still present minimal cervical pain. She again had the same occipital nerve pain but a little lesser. Discussed need for increased modalities and increased *frequency* if not duration of positional breaks during the day in order to begin decreasing hypersensitivity of her neck. She verbalized understanding and will try to start doing this but will likely need further reinforcement. Medx resistance continued at 75 in/lb and she was able to complete 20 reps at a RPE of 3/10.  Will continue to progress per pt's tolerance and HB protocol.  Full peripheral machine circuit completed with no adverse effects.    Patient is making good progress towards established goals.  Pt will continue to benefit from skilled outpatient physical therapy to address the deficits stated in the impairment chart, provide pt/family education and to maximize pt's level of independence in the home and community environment.     Anticipated Barriers for therapy: Chronicity of condition, R shoulder calcifcations    Pt's spiritual, cultural and educational needs considered and pt agreeable to plan of care and goals as stated below:             Goals:   Short term goals: 6 weeks or 10 visits   1.  Pt will demonstratte increased cervical ROM as measured by med ex by 6 degrees from initial test which results in improved  ROM of neck for ease with ADLs and driving  (approp and ongoing)  2. Pt will demonstrate independence with reducing or controlling symptoms with ther ex, movement, or position independently, able to reduce pain 1-2 points on pain scale using strategies taught in therapy  (approp and ongoing)  3. Pt will demonstrate increased MedX average isometric strength value by 10%  with  when compared to the initial testing resulting in improved ability to perform bending, lifting, and carrying activities safely, confidently.  (approp and ongoing)           Long term goals: 10 weeks or 20 visits  1. Pt will demonstratte increased cervical ROM as measured by med ex by 12 degrees from initial test which results in functional ROM of neck for ease with ADLs and driving  (approp and ongoing)  2. Pt will demonstrate increased MedX average isometric strength value  By 20% from initial test to improve ability to lift and carry, and sustain good posture while performing ADL's  (approp and ongoing)  3.Pt will demonstrate reduced pain and improved functional outcomes as reported on the FOTO by reaching a limitation score of < or = 41% or less in order to demonstrate subjective improvement in pt's condition.    (approp and ongoing)  4. Pt will demonstrate independence with reducing or controlling symptoms with ther ex, movement, or position independently, able to reduce pain 2-4 points on pain scale using strategies taught in therapy  (approp and ongoing)  5. Pt will demonstrate independence with the HEP at discharge.   (approp and ongoing)  6. Pt to be able to complete a full 8 hour workday without increased pain.  (approp and ongoing)      Plan   Continue with established Plan of Care towards established PT goals.     Olvin Cano, PT, DPT  5/30/2022

## 2022-05-31 ENCOUNTER — HOSPITAL ENCOUNTER (OUTPATIENT)
Dept: RADIOLOGY | Facility: OTHER | Age: 52
Discharge: HOME | End: 2022-05-31
Attending: INTERNAL MEDICINE
Payer: COMMERCIAL

## 2022-05-31 DIAGNOSIS — R14.0 ABDOMINAL DISTENSION: ICD-10-CM

## 2022-05-31 DIAGNOSIS — K63.5 POLYP OF COLON: ICD-10-CM

## 2022-05-31 DIAGNOSIS — K21.00 GASTRO-ESOPHAGEAL REFLUX DISEASE WITH ESOPHAGITIS, WITHOUT BLEEDING: ICD-10-CM

## 2022-05-31 DIAGNOSIS — Z80.0 FAMILY HISTORY OF COLON CANCER: ICD-10-CM

## 2022-05-31 PROCEDURE — 76700 US EXAM ABDOM COMPLETE: CPT | Mod: 26,,, | Performed by: RADIOLOGY

## 2022-05-31 PROCEDURE — 76700 US ABDOMEN COMPLETE: ICD-10-PCS | Mod: 26,,, | Performed by: RADIOLOGY

## 2022-05-31 PROCEDURE — 76700 US EXAM ABDOM COMPLETE: CPT | Mod: TC

## 2022-06-02 ENCOUNTER — CLINICAL SUPPORT (OUTPATIENT)
Dept: REHABILITATION | Facility: OTHER | Age: 52
End: 2022-06-02
Attending: STUDENT IN AN ORGANIZED HEALTH CARE EDUCATION/TRAINING PROGRAM
Payer: COMMERCIAL

## 2022-06-02 DIAGNOSIS — R29.898 DECREASED RANGE OF MOTION OF NECK: Primary | ICD-10-CM

## 2022-06-02 DIAGNOSIS — M53.82 NECK MUSCLE WEAKNESS: ICD-10-CM

## 2022-06-02 DIAGNOSIS — R29.3 POOR POSTURE: ICD-10-CM

## 2022-06-02 DIAGNOSIS — R29.898 WEAKNESS OF BOTH UPPER EXTREMITIES: ICD-10-CM

## 2022-06-02 PROCEDURE — 97110 THERAPEUTIC EXERCISES: CPT

## 2022-06-02 NOTE — PROGRESS NOTES
"[facility]      Name: Esme Louise  Clinic Number: [id number]    Therapy Diagnosis:   Encounter Diagnoses   Name Primary?    Decreased range of motion of neck Yes    Neck muscle weakness     Poor posture     Weakness of both upper extremities      Physician: Darrell Iqbal MD    Visit Date: 2022      Physician Orders: PT Eval and Treat   Medical Diagnosis from Referral: M54.2 (ICD-10-CM) - Neck pain  Evaluation Date: 2022  Authorization Period Expiration: 2023  Plan of Care Expiration: 2022  Visit # / Visits authorized:      Time In: 904  Time Out: 955  Total Billable Time: 51 minutes    Precautions: Standard and migraines, RA    Pattern of pain determined: 1 ALCIRA    Subjective   Esme arrives to PT and reports feeling good today and no pain.      Patient reports tolerating previous visit fair with increase soreness with nerve pain  Patient reports their pain to be 0/10 on a 0-10 scale with 0 being no pain and 10 being the worst pain imaginable.  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   FOTO documents entered into [employer] - see Media section.     Limitation Score: 48%     Goal: 41%            Treatment    Pt was instructed in and performed the following:     Esme received therapeutic exercises to develop/improved posture, cardiovascular endurance, muscular endurance, lumbar/cervical ROM, " "strength and muscular endurance for 51 minutes including the following exercises:     Aerobic Exercise: Treadmill at 2.5 mph for 5 minutes.  UT stretch 2x30"  LS stretch 2x30"  RIS 10x5"  RIS + rotation x 10 ea  Shoulder ER+ Scap retracts with RTB  2x 10 reps (No moneys)    [facility] 6/2/2022   Visit Number 4   VAS Pain Rating 0   Treadmill Time (in min.) 5   Speed 2.5   Retraction in Sitting 10   Flexion 10   Rotation 10   Scapular Retraction 20   Cervical Weight 84   Repetitions 20   Rating of Perceived Exertion 3   Ice - Z Lie (in min.) 5       Peripheral muscle strengthening which included 1 set of 15-20 repetitions at a slow, controlled 10-13 second per rep pace focused on strengthening supporting musculature for improved body mechanics and functional mobility.  Pt and therapist focused on proper form during treatment to ensure optimal strengthening of each targeted muscle group.  Machines were utilized including torso rotation, leg extension, leg curl, chest press, upright row. Tricep extension, bicep curl, leg press, and hip abduction added visit 3    Esme received the following manual therapy techniques: Suboccipital release, TpR to R UT and STM was applied  for 00 minutes.     Home Exercises Provided and Patient Education Provided   Home exercises include: RIL, RIS, UT stretch, LS stretch, Scap retractions (added RTB)  Cardio program: Visit 5  Lifting education date: Visit 11  Lumbar roll: TBD    Education provided:   - Pacing on Medx  - Addition of massage and other modalities regularly to try and decrease overall sensitivity of cervical musculature    Written Home Exercises Provided: Patient instructed to cont prior HEP.  Exercises were reviewed and Esme was able to demonstrate them prior to the end of the session.  Esme demonstrated good  understanding of the education provided.     See EMR under Patient Instructions for exercises provided prior visit.    Assessment     Esme had no new " complaints today upon arrival. Mat exercises were reviewed and pt was educated on purpose of those exercises in relation to posture. Pt received a 10% cervical weight increase on medx today to 84#, and she proceeded to complete 20 reps at RPE of 3/10. Continue to progress per tolerance; with an additional 10% weight increase.      Patient is making good progress towards established goals.  Pt will continue to benefit from skilled outpatient physical therapy to address the deficits stated in the impairment chart, provide pt/family education and to maximize pt's level of independence in the home and community environment.     Anticipated Barriers for therapy: Chronicity of condition, R shoulder calcifcations    Pt's spiritual, cultural and educational needs considered and pt agreeable to plan of care and goals as stated below:     Goals:   Short term goals: 6 weeks or 10 visits   1.  Pt will demonstratte increased cervical ROM as measured by med ex by 6 degrees from initial test which results in improved  ROM of neck for ease with ADLs and driving  (approp and ongoing)  2. Pt will demonstrate independence with reducing or controlling symptoms with ther ex, movement, or position independently, able to reduce pain 1-2 points on pain scale using strategies taught in therapy  (approp and ongoing)  3. Pt will demonstrate increased MedX average isometric strength value by 10% with  when compared to the initial testing resulting in improved ability to perform bending, lifting, and carrying activities safely, confidently.  (approp and ongoing)           Long term goals: 10 weeks or 20 visits  1. Pt will demonstratte increased cervical ROM as measured by med ex by 12 degrees from initial test which results in functional ROM of neck for ease with ADLs and driving  (approp and ongoing)  2. Pt will demonstrate increased MedX average isometric strength value  By 20% from initial test to improve ability to lift and carry, and  sustain good posture while performing ADL's  (approp and ongoing)  3.Pt will demonstrate reduced pain and improved functional outcomes as reported on the FOTO by reaching a limitation score of < or = 41% or less in order to demonstrate subjective improvement in pt's condition.    (approp and ongoing)  4. Pt will demonstrate independence with reducing or controlling symptoms with ther ex, movement, or position independently, able to reduce pain 2-4 points on pain scale using strategies taught in therapy  (approp and ongoing)  5. Pt will demonstrate independence with the HEP at discharge.   (approp and ongoing)  6. Pt to be able to complete a full 8 hour workday without increased pain.  (approp and ongoing)      Plan   Continue with established Plan of Care towards established PT goals.     Kamila Prasad, PT, DPT  6/2/2022

## 2022-06-05 ENCOUNTER — PATIENT MESSAGE (OUTPATIENT)
Dept: INTERNAL MEDICINE | Facility: CLINIC | Age: 52
End: 2022-06-05
Payer: COMMERCIAL

## 2022-06-06 ENCOUNTER — PATIENT MESSAGE (OUTPATIENT)
Dept: OBSTETRICS AND GYNECOLOGY | Facility: CLINIC | Age: 52
End: 2022-06-06
Payer: COMMERCIAL

## 2022-06-06 ENCOUNTER — CLINICAL SUPPORT (OUTPATIENT)
Dept: REHABILITATION | Facility: OTHER | Age: 52
End: 2022-06-06
Attending: STUDENT IN AN ORGANIZED HEALTH CARE EDUCATION/TRAINING PROGRAM
Payer: COMMERCIAL

## 2022-06-06 DIAGNOSIS — R29.898 DECREASED RANGE OF MOTION OF NECK: Primary | ICD-10-CM

## 2022-06-06 DIAGNOSIS — M53.82 NECK MUSCLE WEAKNESS: ICD-10-CM

## 2022-06-06 DIAGNOSIS — R29.3 POOR POSTURE: ICD-10-CM

## 2022-06-06 DIAGNOSIS — R29.898 WEAKNESS OF BOTH UPPER EXTREMITIES: ICD-10-CM

## 2022-06-06 PROCEDURE — 97110 THERAPEUTIC EXERCISES: CPT | Mod: CQ

## 2022-06-06 NOTE — PROGRESS NOTES
"[facility]      Name: Esme Louise  Clinic Number: [id number]    Therapy Diagnosis:   Encounter Diagnoses   Name Primary?    Decreased range of motion of neck Yes    Neck muscle weakness     Poor posture     Weakness of both upper extremities      Physician: Darrell Iqbal MD    Visit Date: 2022      Physician Orders: PT Eval and Treat   Medical Diagnosis from Referral: M54.2 (ICD-10-CM) - Neck pain  Evaluation Date: 2022  Authorization Period Expiration: 2023  Plan of Care Expiration: 2022  Visit # / Visits authorized:      Time In: 4:00pm  Time Out: 5:00pm  Total Billable Time: 55 minutes    Precautions: Standard and migraines, RA    Pattern of pain determined: 1 ALCIRA    Subjective   Esme arrives to PT with no complaints of pain.     Patient reports tolerating previous visit fair with increase soreness with nerve pain  Patient reports their pain to be 0/10 on a 0-10 scale with 0 being no pain and 10 being the worst pain imaginable.  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   FOTO documents entered into [employer] - see Media section.     Limitation Score: 48%     Goal: 41%            Treatment    Pt was instructed in and performed the following:     Esme received therapeutic exercises to develop/improved posture, cardiovascular endurance, muscular endurance, lumbar/cervical ROM, strength and " "muscular endurance for 55 minutes including the following exercises:     Aerobic Exercise: Treadmill at 2.5 mph for 5 minutes NP  +Half FR:pec stretch 2 mins, alt shlder flex x10, snow angels x10, horizontal abd w/RTB  +prone W (increased shoulder pain) prone Tx10,3"  UT stretch 2x30"  LS stretch 2x30"  RIS 10x5"  RIS + rotation x 10 ea  Shoulder ER+ Scap retracts with RTB  2x 10 reps (No moneys) (NP)    [facility] 6/6/2022   Visit Number 5   VAS Pain Rating 0   Treadmill Time (in min.) -   Speed -   Time 5   Cervical Stretches - Retraction In Lying -   Retraction in Sitting -   Flexion -   Sidebending -   Rotation -   Scapular Retraction -   Manual Therapy -   Cervical Extension Seat Pad -   Seat Adjustment -   Top Dead Center -   Counterweight -   Cervical Flexion -   Cervical Extension -   Cervical Peak Torque -   Cervical Weight 93   Repetitions 18   Rating of Perceived Exertion 3   Ice - Z Lie (in min.) 5             Peripheral muscle strengthening which included 1 set of 15-20 repetitions at a slow, controlled 10-13 second per rep pace focused on strengthening supporting musculature for improved body mechanics and functional mobility.  Pt and therapist focused on proper form during treatment to ensure optimal strengthening of each targeted muscle group.  Machines were utilized including torso rotation, leg extension, leg curl, chest press, upright row. Tricep extension, bicep curl, leg press, and hip abduction added visit 3    Esme received the following manual therapy techniques: Suboccipital release, TpR to R UT and STM was applied  for 00 minutes.     Home Exercises Provided and Patient Education Provided   Home exercises include: RIL, RIS, UT stretch, LS stretch, Scap retractions (added RTB)  Cardio program: 6/6/22  Lifting education date: Visit 11  Lumbar roll: TBD    Education provided:   - Pacing on Medx  - Addition of massage and other modalities regularly to try and decrease overall " "sensitivity of cervical musculature    Written Home Exercises Provided: Patient instructed to cont prior HEP.  Exercises were reviewed and Esme was able to demonstrate them prior to the end of the session.  Esme demonstrated good  understanding of the education provided.     See EMR under Patient Instructions for exercises provided prior visit.    Assessment   Patient tolerated most new periscapular strengthening exercises with the exception of "W's" and snow angels due to increased shoulder pain. Discussed health coaching with patient who expressed interest due to anxiety. Patient tolerated 10% weight increase on c/s Medx machine with 18 reps and RPE of 3/10.  Continue to progress per tolerance per HB protocol.  Provided patient with cardio handout and discussed the benefits of a cardio program.     Patient is making good progress towards established goals.  Pt will continue to benefit from skilled outpatient physical therapy to address the deficits stated in the impairment chart, provide pt/family education and to maximize pt's level of independence in the home and community environment.     Anticipated Barriers for therapy: Chronicity of condition, R shoulder calcifcations    Pt's spiritual, cultural and educational needs considered and pt agreeable to plan of care and goals as stated below:     Goals:   Short term goals: 6 weeks or 10 visits   1.  Pt will demonstratte increased cervical ROM as measured by med ex by 6 degrees from initial test which results in improved  ROM of neck for ease with ADLs and driving  (approp and ongoing)  2. Pt will demonstrate independence with reducing or controlling symptoms with ther ex, movement, or position independently, able to reduce pain 1-2 points on pain scale using strategies taught in therapy  (approp and ongoing)  3. Pt will demonstrate increased MedX average isometric strength value by 10% with  when compared to the initial testing resulting in improved ability to " perform bending, lifting, and carrying activities safely, confidently.  (approp and ongoing)           Long term goals: 10 weeks or 20 visits  1. Pt will demonstratte increased cervical ROM as measured by med ex by 12 degrees from initial test which results in functional ROM of neck for ease with ADLs and driving  (approp and ongoing)  2. Pt will demonstrate increased MedX average isometric strength value  By 20% from initial test to improve ability to lift and carry, and sustain good posture while performing ADL's  (approp and ongoing)  3.Pt will demonstrate reduced pain and improved functional outcomes as reported on the FOTO by reaching a limitation score of < or = 41% or less in order to demonstrate subjective improvement in pt's condition.    (approp and ongoing)  4. Pt will demonstrate independence with reducing or controlling symptoms with ther ex, movement, or position independently, able to reduce pain 2-4 points on pain scale using strategies taught in therapy  (approp and ongoing)  5. Pt will demonstrate independence with the HEP at discharge.   (approp and ongoing)  6. Pt to be able to complete a full 8 hour workday without increased pain.  (approp and ongoing)      Plan   Continue with established Plan of Care towards established PT goals.     Orly Shah, PTA, DPT  6/6/2022

## 2022-06-08 ENCOUNTER — CLINICAL SUPPORT (OUTPATIENT)
Dept: REHABILITATION | Facility: OTHER | Age: 52
End: 2022-06-08
Attending: STUDENT IN AN ORGANIZED HEALTH CARE EDUCATION/TRAINING PROGRAM
Payer: COMMERCIAL

## 2022-06-08 DIAGNOSIS — M53.82 NECK MUSCLE WEAKNESS: ICD-10-CM

## 2022-06-08 DIAGNOSIS — R29.3 POOR POSTURE: ICD-10-CM

## 2022-06-08 DIAGNOSIS — R29.898 WEAKNESS OF BOTH UPPER EXTREMITIES: ICD-10-CM

## 2022-06-08 DIAGNOSIS — R29.898 DECREASED RANGE OF MOTION OF NECK: Primary | ICD-10-CM

## 2022-06-08 PROCEDURE — 97110 THERAPEUTIC EXERCISES: CPT

## 2022-06-08 NOTE — PROGRESS NOTES
"[facility]      Name: Esme Louise  Clinic Number: [id number]    Therapy Diagnosis:   Encounter Diagnoses   Name Primary?    Decreased range of motion of neck Yes    Neck muscle weakness     Poor posture     Weakness of both upper extremities      Physician: Darrell Iqbal MD    Visit Date: 2022      Physician Orders: PT Eval and Treat   Medical Diagnosis from Referral: M54.2 (ICD-10-CM) - Neck pain  Evaluation Date: 2022  Authorization Period Expiration: 2023  Plan of Care Expiration: 2022  Visit # / Visits authorized:      Time In: 8:30pm  Time Out: 9:30pm  Total Billable Time: 55 minutes    Precautions: Standard and migraines, RA    Pattern of pain determined: 1 ALCIRA    Subjective   Esme arrives to PT with no complaints of pain. She reports she is doing a lot better and almost never has the upper trap muscle spasms that she used to have daily.     Patient reports tolerating previous visit fair with increase soreness with nerve pain  Patient reports their pain to be 0/10 on a 0-10 scale with 0 being no pain and 10 being the worst pain imaginable.  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   FOTO documents entered into [employer] - see Media section.     Limitation Score: 48%   Visit 6: 40%  Goal: 41%            Treatment    Pt was instructed in and performed the following:     Esme received " "therapeutic exercises to develop/improved posture, cardiovascular endurance, muscular endurance, lumbar/cervical ROM, strength and muscular endurance for 55 minutes including the following exercises:     Aerobic Exercise: Treadmill at 2.5 mph for 5 minutes    UT stretch 2x30"  LS stretch 2x30"  RIS 10x5"  RIS + rotation x 10 ea      Not performed 6/8/2022  +Half FR:pec stretch 2 mins, alt shlder flex x10, snow angels x10, horizontal abd w/RTB  +prone W (increased shoulder pain) prone Tx10,3"  Shoulder ER+ Scap retracts with RTB  2x 10 reps (No moneys)    [facility] 6/8/2022   Visit Number 6   VAS Pain Rating 0   Treadmill Time (in min.) 5   Speed -   Time -   Retraction in Lying -   Retraction in Sitting 10   Flexion 10   Sidebending -   Rotation 10   Scapular Retraction -   Manual Therapy 5   Cervical Extension - Seat Pad -   Seat Adjustment -   Top Dead Center -   Counterweight -   Cervical Flexion -   Cervical Extension -   Cervical Peak Torque -   Cervical Weight 93   Repetitions 20   Rating of Perceived Exertion 3           Peripheral muscle strengthening which included 1 set of 15-20 repetitions at a slow, controlled 10-13 second per rep pace focused on strengthening supporting musculature for improved body mechanics and functional mobility.  Pt and therapist focused on proper form during treatment to ensure optimal strengthening of each targeted muscle group.  Machines were utilized including torso rotation, leg extension, leg curl, chest press, upright row. Tricep extension, bicep curl, leg press, and hip abduction added visit 3    Esme received the following manual therapy techniques: Suboccipital release, TpR to R UT and STM was applied  for 5 minutes.     Home Exercises Provided and Patient Education Provided   Home exercises include: RIL, RIS, UT stretch, LS stretch, Scap retractions (added RTB)  Cardio program: 6/6/22  Lifting education date: Visit 11  Lumbar roll: TBD    Education " "provided:   - Pacing on Medx  - Addition of massage and other modalities regularly to try and decrease overall sensitivity of cervical musculature    Written Home Exercises Provided: Patient instructed to cont prior HEP.  Exercises were reviewed and Esme was able to demonstrate them prior to the end of the session.  Esme demonstrated good  understanding of the education provided.     See EMR under Patient Instructions for exercises provided prior visit.    Assessment   Patient comes to therapy with improved/reduced overall pain as well as improved FOTO scores demonstrating better overall function and activity tolerance. Discussed hurt vs harm given her concerns over "damaging something" as she starts to exercise and be more active over time. Verbalized understanding but may need follow-up for reassurance in the future.MedX resistance continued at 93 in/lb and she was able to complete 20 reps and RPE of 3/10.  Continue to progress per tolerance per HB protocol, likely 10% increase again next session.     Patient is making good progress towards established goals.  Pt will continue to benefit from skilled outpatient physical therapy to address the deficits stated in the impairment chart, provide pt/family education and to maximize pt's level of independence in the home and community environment.     Anticipated Barriers for therapy: Chronicity of condition, R shoulder calcifcations    Pt's spiritual, cultural and educational needs considered and pt agreeable to plan of care and goals as stated below:     Goals:   Short term goals: 6 weeks or 10 visits   1.  Pt will demonstratte increased cervical ROM as measured by med ex by 6 degrees from initial test which results in improved  ROM of neck for ease with ADLs and driving  (approp and ongoing)  2. Pt will demonstrate independence with reducing or controlling symptoms with ther ex, movement, or position independently, able to reduce pain 1-2 points on pain scale using " strategies taught in therapy  (approp and ongoing)  3. Pt will demonstrate increased MedX average isometric strength value by 10% with  when compared to the initial testing resulting in improved ability to perform bending, lifting, and carrying activities safely, confidently.  (approp and ongoing)           Long term goals: 10 weeks or 20 visits  1. Pt will demonstratte increased cervical ROM as measured by med ex by 12 degrees from initial test which results in functional ROM of neck for ease with ADLs and driving  (approp and ongoing)  2. Pt will demonstrate increased MedX average isometric strength value  By 20% from initial test to improve ability to lift and carry, and sustain good posture while performing ADL's  (approp and ongoing)  3.Pt will demonstrate reduced pain and improved functional outcomes as reported on the FOTO by reaching a limitation score of < or = 41% or less in order to demonstrate subjective improvement in pt's condition.    (approp and ongoing)  4. Pt will demonstrate independence with reducing or controlling symptoms with ther ex, movement, or position independently, able to reduce pain 2-4 points on pain scale using strategies taught in therapy  (approp and ongoing)  5. Pt will demonstrate independence with the HEP at discharge.   (approp and ongoing)  6. Pt to be able to complete a full 8 hour workday without increased pain.  (approp and ongoing)      Plan   Continue with established Plan of Care towards established PT goals.     Olvin Cano, PT, DPT  6/8/2022

## 2022-06-20 ENCOUNTER — CLINICAL SUPPORT (OUTPATIENT)
Dept: REHABILITATION | Facility: OTHER | Age: 52
End: 2022-06-20
Attending: STUDENT IN AN ORGANIZED HEALTH CARE EDUCATION/TRAINING PROGRAM
Payer: COMMERCIAL

## 2022-06-20 DIAGNOSIS — M53.82 NECK MUSCLE WEAKNESS: ICD-10-CM

## 2022-06-20 DIAGNOSIS — R29.898 DECREASED RANGE OF MOTION OF NECK: Primary | ICD-10-CM

## 2022-06-20 DIAGNOSIS — R29.3 POOR POSTURE: ICD-10-CM

## 2022-06-20 DIAGNOSIS — R29.898 WEAKNESS OF BOTH UPPER EXTREMITIES: ICD-10-CM

## 2022-06-20 PROCEDURE — 97110 THERAPEUTIC EXERCISES: CPT | Performed by: PHYSICAL MEDICINE & REHABILITATION

## 2022-06-20 NOTE — PROGRESS NOTES
"[facility]      Name: Esme Louise  Clinic Number: [id number]    Therapy Diagnosis:   Encounter Diagnoses   Name Primary?    Decreased range of motion of neck Yes    Neck muscle weakness     Poor posture     Weakness of both upper extremities      Physician: Darrell Iqbal MD    Visit Date: 2022      Physician Orders: PT Eval and Treat   Medical Diagnosis from Referral: M54.2 (ICD-10-CM) - Neck pain  Evaluation Date: 2022  Authorization Period Expiration: 2023  Plan of Care Expiration: 2022  Reassessment due: 22  Visit # / Visits authorized:      Time In: 3:45 pm  Time Out: 4:52 pm  Total Billable Time: 55 minutes    Precautions: Standard and migraines, RA    Pattern of pain determined: 1 ALCIRA    Subjective   Esme arrives to PT with no complaints of pain.  She is trying to stretch 2/day at home.  She reports her progress is somewhat complicated by gall bladder issues and she will require surgery at some point.  She sits at home for work, has a good chair, but we revisited lumbar roll for the car and potentially at computer, and suggested she bring pictures of her at the computer.  She reports she is doing a lot better over all.       Patient reports tolerating previous visit fair with increase soreness with nerve pain  Patient reports their pain to be 0/10 on a 0-10 scale with 0 being no pain and 10 being the worst pain imaginable.  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  Leisure: arts and crafts with linoleum, embroidery    Pt goals: "I want to learn how to use my body physically so that I can exercise regularly without harming myself"    Objective     Baseline IM Testing Results:   Date of testin2022  ROM  deg   Max Peak Torque 188    Min Peak Torque 57    Flex/Ext Ratio 3.3:1   % below normative data 29%     Range of Motion - " "MOVEMENT LOSS     ROM Loss initially  6/20/22   Flexion minimal loss No loss   Extension minimal loss Min loss   Side bending Right major loss Min loss   Side bending Left major loss Min loss   Rotation Right moderate loss Min loss   Rotation Left moderate loss Min loss   Protraction minimal loss No loss   Retraction  moderate loss Min loss          Limitation/Restriction for FOTO neck Survey     Therapist reviewed FOTO scores for Esme Louise on 5/23/2022.   FOTO documents entered into [employer] - see Media section.     Limitation Score: 48%   Visit 6: 40%  Goal: 41%            Treatment    Pt was instructed in and performed the following:     Esme received therapeutic exercises to develop/improved posture, cardiovascular endurance, muscular endurance, lumbar/cervical ROM, strength and muscular endurance for 55 minutes including the following exercises:     Aerobic Exercise: Treadmill at 2.5 mph for 5 minutes    UT stretch 2x30"  LS stretch 2x30"  RIS 10x5"  RIS + rotation x 2 ea  Retraction in sitting with extension - end range pull no worse (added to HEP)    Scapular diagonal in standing (cross X) yellow theraband 10 reps each direction (added to HEP)       Not performed 6/20/2022  +Half FR:pec stretch 2 mins, alt shlder flex x10, snow angels x10, horizontal abd w/RTB  +prone W (increased shoulder pain) prone Tx10,3"  Shoulder ER+ Scap retracts with RTB  2x 10 reps (No moneys)      [facility] 6/20/2022   Visit Number 7   VAS Pain Rating 0   Treadmill Time (in min.) 5   Speed 2.5   Time -   Cervical Stretches - Retraction In Lying -   Retraction in Sitting 10   Retraction with Extension 1   Flexion 1   Sidebending 2   Rotation 2   Scapular Retraction -   Manual Therapy -   Cervical Extension Seat Pad -   Seat Adjustment -   Top Dead Center -   Counterweight -   Cervical Flexion -   Cervical Extension -   Cervical Peak Torque -   Cervical Weight 105   Repetitions 15   Rating of Perceived Exertion 4   Ice - Z " Lie (in min.) 5             Peripheral muscle strengthening which included 1 set of 15-20 repetitions at a slow, controlled 10-13 second per rep pace focused on strengthening supporting musculature for improved body mechanics and functional mobility.  Pt and therapist focused on proper form during treatment to ensure optimal strengthening of each targeted muscle group.  Machines were utilized including torso rotation, leg extension, leg curl, chest press, upright row. Tricep extension, bicep curl, leg press, and hip abduction added visit 3    Esme received the following manual therapy techniques: nil    Home Exercises Provided and Patient Education Provided   Home exercises include: RIL, RIS, RIS with extension,  UT stretch, LS stretch, Scap retractions (added RTB), scapular  Diagonals yellow theraband  Cardio program: 6/6/22   Lifting education date: Visit 11  Lumbar roll:  Has good chair at home, revisited lumbar roll 6/20/22 and encouraged pictures of her at her computer    Education provided:   - Pacing on Medx  - posture and ergonomics at computer, encouraged and tried lumbar roll  -addition of scapular exercises and neck extension    Written Home Exercises Provided: Patient instructed to cont prior HEP.  Exercises were reviewed and Esme was able to demonstrate them prior to the end of the session.  Esme demonstrated good  understanding of the education provided.     See EMR under Patient Instructions for exercises provided prior visit.    Assessment   Patient comes to therapy with improved/reduced overall pain as well as improved FOTO scores last visit, and improving neck ROM demonstrating better overall function and activity tolerance.   Patient recognizes stress leads to tension in her shoulders and she is trying to be aware of this.  We discussed health coaching which she may revisit in the future when she is not so busy.   She may benefit from diaphragmatic breathing exercises in the future to assist  with relaxation.  MedX resistance increased to 105  in/lb and she was able to complete 15 reps and RPE of 4-5/10.  Continue to progress per tolerance per HB protocol.    Patient is making good progress towards established goals.  Pt will continue to benefit from skilled outpatient physical therapy to address the deficits stated in the impairment chart, provide pt/family education and to maximize pt's level of independence in the home and community environment.     Anticipated Barriers for therapy: Chronicity of condition, R shoulder calcifcations    Pt's spiritual, cultural and educational needs considered and pt agreeable to plan of care and goals as stated below:     Goals:   Short term goals: 6 weeks or 10 visits   1.  Pt will demonstratte increased cervical ROM as measured by med ex by 6 degrees from initial test which results in improved  ROM of neck for ease with ADLs and driving  (approp and ongoing)  2. Pt will demonstrate independence with reducing or controlling symptoms with ther ex, movement, or position independently, able to reduce pain 1-2 points on pain scale using strategies taught in therapy  (approp and ongoing)  3. Pt will demonstrate increased MedX average isometric strength value by 10% with  when compared to the initial testing resulting in improved ability to perform bending, lifting, and carrying activities safely, confidently.  (approp and ongoing)           Long term goals: 10 weeks or 20 visits  1. Pt will demonstratte increased cervical ROM as measured by med ex by 12 degrees from initial test which results in functional ROM of neck for ease with ADLs and driving  (approp and ongoing)  2. Pt will demonstrate increased MedX average isometric strength value  By 20% from initial test to improve ability to lift and carry, and sustain good posture while performing ADL's  (approp and ongoing)  3.Pt will demonstrate reduced pain and improved functional outcomes as reported on the FOTO by  reaching a limitation score of < or = 41% or less in order to demonstrate subjective improvement in pt's condition.    (approp and ongoing)  4. Pt will demonstrate independence with reducing or controlling symptoms with ther ex, movement, or position independently, able to reduce pain 2-4 points on pain scale using strategies taught in therapy  (approp and ongoing)  5. Pt will demonstrate independence with the HEP at discharge.   (approp and ongoing)  6. Pt to be able to complete a full 8 hour workday without increased pain.  (approp and ongoing)      Plan   Continue with established Plan of Care towards established PT goals.     Alea Thompson, PT,   6/20/2022

## 2022-06-21 ENCOUNTER — TELEPHONE (OUTPATIENT)
Dept: PAIN MEDICINE | Facility: CLINIC | Age: 52
End: 2022-06-21
Payer: COMMERCIAL

## 2022-06-21 ENCOUNTER — PATIENT MESSAGE (OUTPATIENT)
Dept: PAIN MEDICINE | Facility: CLINIC | Age: 52
End: 2022-06-21
Payer: COMMERCIAL

## 2022-06-21 DIAGNOSIS — G43.119 INTRACTABLE MIGRAINE WITH AURA WITHOUT STATUS MIGRAINOSUS: Primary | ICD-10-CM

## 2022-06-22 ENCOUNTER — CLINICAL SUPPORT (OUTPATIENT)
Dept: REHABILITATION | Facility: OTHER | Age: 52
End: 2022-06-22
Attending: STUDENT IN AN ORGANIZED HEALTH CARE EDUCATION/TRAINING PROGRAM
Payer: COMMERCIAL

## 2022-06-22 DIAGNOSIS — M53.82 NECK MUSCLE WEAKNESS: ICD-10-CM

## 2022-06-22 DIAGNOSIS — R29.3 POOR POSTURE: ICD-10-CM

## 2022-06-22 DIAGNOSIS — R29.898 WEAKNESS OF BOTH UPPER EXTREMITIES: ICD-10-CM

## 2022-06-22 DIAGNOSIS — R29.898 DECREASED RANGE OF MOTION OF NECK: Primary | ICD-10-CM

## 2022-06-22 PROCEDURE — 97140 MANUAL THERAPY 1/> REGIONS: CPT

## 2022-06-22 PROCEDURE — 97110 THERAPEUTIC EXERCISES: CPT

## 2022-06-22 NOTE — PROGRESS NOTES
"[facility]      Name: Esme Louise  Clinic Number: [id number]    Therapy Diagnosis:   Encounter Diagnoses   Name Primary?    Decreased range of motion of neck Yes    Neck muscle weakness     Poor posture     Weakness of both upper extremities      Physician: Darrell Iqbal MD    Visit Date: 2022      Physician Orders: PT Eval and Treat   Medical Diagnosis from Referral: M54.2 (ICD-10-CM) - Neck pain  Evaluation Date: 2022  Authorization Period Expiration: 2023  Plan of Care Expiration: 2022  Reassessment due: 22  Visit # / Visits authorized:      Time In: 3:35 pm  Time Out: 4:35 pm  Total Billable Time: 55 minutes    Precautions: Standard and migraines, RA    Pattern of pain determined: 1 ALCIRA    Subjective   Esme reports some tightness in the suboccipital region, but no pain, continues to see improvement with the program, but is mostly concerned about how she has to get gallbladder surgery and the stress is affecting her    Patient reports tolerating previous visit fair with increase soreness with nerve pain  Patient reports their pain to be 0/10 on a 0-10 scale with 0 being no pain and 10 being the worst pain imaginable.  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  Leisure: arts and crafts with linoleum, embroidery    Pt goals: "I want to learn how to use my body physically so that I can exercise regularly without harming myself"    Objective     Baseline IM Testing Results:   Date of testin2022  ROM  deg   Max Peak Torque 188    Min Peak Torque 57    Flex/Ext Ratio 3.3:1   % below normative data 29%     Range of Motion - MOVEMENT LOSS     ROM Loss initially  22   Flexion minimal loss No loss   Extension minimal loss Min loss   Side bending Right major loss Min loss   Side bending Left major loss Min loss   Rotation Right moderate " "loss Min loss   Rotation Left moderate loss Min loss   Protraction minimal loss No loss   Retraction  moderate loss Min loss          Limitation/Restriction for FOTO neck Survey     Therapist reviewed FOTO scores for Esme Louise on 5/23/2022.   FOTO documents entered into [employer] - see Media section.     Limitation Score: 48%   Visit 6: 40%  Goal: 41%            Treatment    Pt was instructed in and performed the following:     Esme received therapeutic exercises to develop/improved posture, cardiovascular endurance, muscular endurance, lumbar/cervical ROM, strength and muscular endurance for 45 minutes including the following exercises:     Aerobic Exercise: Treadmill at 2.5 mph for 5 minutes    UT stretch 2x30"  LS stretch 2x30"  RIS 10x5"  RIS + rotation x 10   Retraction in sitting with extension - added towel today  Scapular diagonal in standing (cross X) yellow theraband 10 reps each direction       Not performed:  +Half FR:pec stretch 2 mins, alt shlder flex x10, snow angels x10, horizontal abd w/RTB  +prone W (increased shoulder pain) prone Tx10,3"  Shoulder ER+ Scap retracts with RTB  2x 10 reps (No moneys)      [facility] - Short 6/22/2022   Visit Number 8   VAS Pain Rating 0   Treadmill Time (in min.) 5   Speed -   Time -   Retraction in Lying -   Retraction in Sitting 10   Retraction with Extension 10   Flexion -   Sidebending -   Rotation -   Scapular Retraction 10   Manual Therapy 10   Cervical Extension - Seat Pad -   Seat Adjustment -   Top Dead Center -   Counterweight -   Cervical Flexion -   Cervical Extension -   Cervical Peak Torque -   Cervical Weight 105   Repetitions 20   Rating of Perceived Exertion 3               Peripheral muscle strengthening which included 1 set of 15-20 repetitions at a slow, controlled 10-13 second per rep pace focused on strengthening supporting musculature for improved body mechanics and functional mobility.  Pt and therapist focused on proper form during " treatment to ensure optimal strengthening of each targeted muscle group.  Machines were utilized including torso rotation, leg extension, leg curl, chest press, upright row. Tricep extension, bicep curl, leg press, and hip abduction added visit 3    Esme received the following manual therapy techniques: STM cervical paraspinals, suboccipital release for 10 min    Home Exercises Provided and Patient Education Provided   Home exercises include: RIL, RIS, RIS with extension,  UT stretch, LS stretch, Scap retractions (added RTB), scapular  Diagonals yellow theraband  Cardio program: 6/6/22   Lifting education date: Visit 11  Lumbar roll:  Has good chair at home, revisited lumbar roll 6/20/22 and encouraged pictures of her at her computer    Education provided:   - Pacing on Medx  - posture and ergonomics at computer, encouraged and tried lumbar roll  -addition of scapular exercises and neck extension    Written Home Exercises Provided: Patient instructed to cont prior HEP.  Exercises were reviewed and Esme was able to demonstrate them prior to the end of the session.  Esme demonstrated good  understanding of the education provided.     See EMR under Patient Instructions for exercises provided prior visit.    Assessment   Patient presents today without complaints of pain. Towel added for cervical retraction with extension and pt demo increased range of motion and increased comfort with exercise. Instructed to modify for HEP. Medx resistance maintained at 105 in/lbs and pt was able to complete 20 reps with 3/10 RPE. Progress 10% next visit.     Patient is making good progress towards established goals.  Pt will continue to benefit from skilled outpatient physical therapy to address the deficits stated in the impairment chart, provide pt/family education and to maximize pt's level of independence in the home and community environment.     Anticipated Barriers for therapy: Chronicity of condition, R shoulder  calcifcations    Pt's spiritual, cultural and educational needs considered and pt agreeable to plan of care and goals as stated below:     Goals:   Short term goals: 6 weeks or 10 visits   1.  Pt will demonstratte increased cervical ROM as measured by med ex by 6 degrees from initial test which results in improved  ROM of neck for ease with ADLs and driving  (approp and ongoing)  2. Pt will demonstrate independence with reducing or controlling symptoms with ther ex, movement, or position independently, able to reduce pain 1-2 points on pain scale using strategies taught in therapy  (approp and ongoing)  3. Pt will demonstrate increased MedX average isometric strength value by 10% with  when compared to the initial testing resulting in improved ability to perform bending, lifting, and carrying activities safely, confidently.  (approp and ongoing)        Long term goals: 10 weeks or 20 visits  1. Pt will demonstratte increased cervical ROM as measured by med ex by 12 degrees from initial test which results in functional ROM of neck for ease with ADLs and driving  (approp and ongoing)  2. Pt will demonstrate increased MedX average isometric strength value  By 20% from initial test to improve ability to lift and carry, and sustain good posture while performing ADL's  (approp and ongoing)  3.Pt will demonstrate reduced pain and improved functional outcomes as reported on the FOTO by reaching a limitation score of < or = 41% or less in order to demonstrate subjective improvement in pt's condition.    (approp and ongoing)  4. Pt will demonstrate independence with reducing or controlling symptoms with ther ex, movement, or position independently, able to reduce pain 2-4 points on pain scale using strategies taught in therapy  (approp and ongoing)  5. Pt will demonstrate independence with the HEP at discharge.   (approp and ongoing)  6. Pt to be able to complete a full 8 hour workday without increased pain.  (approp and  ongoing)      Plan   Continue with established Plan of Care towards established PT goals.     Magan Trivedi, PT,   6/22/2022

## 2022-06-29 ENCOUNTER — PATIENT MESSAGE (OUTPATIENT)
Dept: SURGERY | Facility: OTHER | Age: 52
End: 2022-06-29
Payer: COMMERCIAL

## 2022-07-07 ENCOUNTER — DOCUMENTATION ONLY (OUTPATIENT)
Dept: REHABILITATION | Facility: OTHER | Age: 52
End: 2022-07-07
Payer: COMMERCIAL

## 2022-07-07 NOTE — PROGRESS NOTES
Spoke with patient after cancellation of today's visit because she had nothing further scheduled. Esme states that she is about to have surgery to have her gall bladder removed and she needed to hold therapy at least until she has a better idea of her recovery timeline. She intends to reach back out and reschedule once she knows that that timeline looks like but will hold therapy for the immediate future.    Olvin Cano PT, DPT

## 2022-07-08 ENCOUNTER — ANESTHESIA EVENT (OUTPATIENT)
Dept: SURGERY | Facility: OTHER | Age: 52
End: 2022-07-08
Payer: COMMERCIAL

## 2022-07-08 ENCOUNTER — HOSPITAL ENCOUNTER (OUTPATIENT)
Dept: PREADMISSION TESTING | Facility: OTHER | Age: 52
Discharge: HOME | End: 2022-07-08
Attending: SPECIALIST
Payer: COMMERCIAL

## 2022-07-08 ENCOUNTER — PATIENT MESSAGE (OUTPATIENT)
Dept: SURGERY | Facility: OTHER | Age: 52
End: 2022-07-08
Payer: COMMERCIAL

## 2022-07-08 VITALS
RESPIRATION RATE: 16 BRPM | WEIGHT: 162 LBS | HEIGHT: 66 IN | HEART RATE: 74 BPM | TEMPERATURE: 98 F | OXYGEN SATURATION: 99 % | SYSTOLIC BLOOD PRESSURE: 141 MMHG | BODY MASS INDEX: 26.03 KG/M2 | DIASTOLIC BLOOD PRESSURE: 74 MMHG

## 2022-07-08 RX ORDER — DICYCLOMINE HYDROCHLORIDE 20 MG/1
20 TABLET ORAL DAILY PRN
COMMUNITY
End: 2022-11-22

## 2022-07-08 RX ORDER — SCOLOPAMINE TRANSDERMAL SYSTEM 1 MG/1
1 PATCH, EXTENDED RELEASE TRANSDERMAL ONCE
Status: CANCELLED | OUTPATIENT
Start: 2022-07-08 | End: 2022-07-08

## 2022-07-08 RX ORDER — LIDOCAINE HYDROCHLORIDE 10 MG/ML
0.5 INJECTION, SOLUTION EPIDURAL; INFILTRATION; INTRACAUDAL; PERINEURAL ONCE
Status: CANCELLED | OUTPATIENT
Start: 2022-07-08 | End: 2022-07-08

## 2022-07-08 RX ORDER — PREGABALIN 50 MG/1
50 CAPSULE ORAL ONCE
Status: CANCELLED | OUTPATIENT
Start: 2022-07-08 | End: 2022-07-08

## 2022-07-08 RX ORDER — SODIUM CHLORIDE, SODIUM LACTATE, POTASSIUM CHLORIDE, CALCIUM CHLORIDE 600; 310; 30; 20 MG/100ML; MG/100ML; MG/100ML; MG/100ML
INJECTION, SOLUTION INTRAVENOUS CONTINUOUS
Status: CANCELLED | OUTPATIENT
Start: 2022-07-08

## 2022-07-08 RX ORDER — ACETAMINOPHEN 500 MG
1000 TABLET ORAL
Status: CANCELLED | OUTPATIENT
Start: 2022-07-08 | End: 2022-07-08

## 2022-07-08 RX ORDER — AMOXICILLIN 500 MG
CAPSULE ORAL DAILY
COMMUNITY

## 2022-07-08 NOTE — ANESTHESIA PREPROCEDURE EVALUATION
07/08/2022  Esme Louise is a 52 y.o., female.      Pre-op Assessment    I have reviewed the Patient Summary Reports.     I have reviewed the Nursing Notes. I have reviewed the NPO Status.   I have reviewed the Medications.     Review of Systems  Anesthesia Hx:  Denies Family Hx of Anesthesia complications.  Personal Hx of Anesthesia complications, Post-Operative Nausea/Vomiting Slow To Awaken/Delayed Emergence   Social:  Former Smoker    Hematology/Oncology:     Oncology Normal    -- Anemia:   EENT/Dental:EENT/Dental Normal   Cardiovascular:   Hypertension, well controlled hyperlipidemia    Pulmonary:  Pulmonary Normal    Renal/:  Renal/ Normal     Hepatic/GI:   GERD, well controlled    Musculoskeletal:   Arthritis (RA)     Neurological:   Denies Headaches. (resolved following occipital nerve procedure)    Endocrine:   Hypothyroidism    Dermatological:  Skin Normal    Psych:   Psychiatric History anxiety (on propanolol)          Physical Exam  General: Well nourished, Cooperative, Alert and Oriented    Airway:  Mallampati: II   Mouth Opening: Normal  TM Distance: Normal  Neck ROM: Normal ROM    Dental:  Intact        Anesthesia Plan  Type of Anesthesia, risks & benefits discussed:    Anesthesia Type: Gen ETT  Intra-op Monitoring Plan: Standard ASA Monitors  Post Op Pain Control Plan: multimodal analgesia and IV/PO Opioids PRN  Induction:  IV  Airway Plan: Video  Informed Consent: Informed consent signed with the Patient and all parties understand the risks and agree with anesthesia plan.  All questions answered.   ASA Score: 2  Anesthesia Plan Notes: Pt requests IV not go over wrist due to RA  Does not have full ROM R shoulder, care with positioning, neck no sx's    Labs in epic ok, hb 11.3    Ready For Surgery From Anesthesia Perspective.     .

## 2022-07-08 NOTE — DISCHARGE INSTRUCTIONS
Information to Prepare you for your Surgery    PRE-ADMIT TESTING -  [phone]    [address] [facility]          Your surgery has been scheduled at [facility]. We are pleased to have the opportunity to serve you. For Further Information please call [phone].    On the day of surgery please report to the Information Desk on the 1st floor.    CONTACT YOUR PHYSICIAN'S OFFICE THE DAY PRIOR TO YOUR SURGERY TO OBTAIN YOUR ARRIVAL TIME.     The evening before surgery do not eat anything after 9 p.m. ( this includes hard candy, chewing gum and mints).  You may only have GATORADE, POWERADE AND WATER  from 9 p.m. until you leave your home.   DO NOT DRINK ANY LIQUIDS ON THE WAY TO THE HOSPITAL.      Why does your anesthesiologist allow you to drink Gatorade/Powerade before surgery?  Gatorade/Powerade helps to increase your comfort before surgery and to decrease your nausea after surgery. The carbohydrates in Gatorade/Powerade help reduce your body's stress response to surgery.  If you are a diabetic-drink only water prior to surgery.      Current Visitor policy(12/27/2021) - Patients may have 2 visitors pre and post procedure. Only 2 visitors will be allowed in the Surgical building with the patient.     SPECIAL MEDICATION INSTRUCTIONS: TAKE medications checked off by the Anesthesiologist on your Medication List.    Angiogram Patients: Take medications as instructed by your physician, including aspirin.     Surgery Patients:    If you take ASPIRIN - Your PHYSICIAN/SURGEON will need to inform you IF/OR when you need to stop taking aspirin prior to your surgery.     Do Not take any medications containing IBUPROFEN.    Do Not Wear any make-up (especially eye make-up) to surgery. Please remove any false eyelashes or eyelash extensions. If you arrive the day of surgery with makeup/eyelashes on you will be required to remove prior to surgery. (There is a risk of corneal  abrasions if eye makeup/eyelash extensions are not removed)      Leave all valuables at home.   Do Not wear any jewelry or watches, including any metal in body piercings. Jewelry must be removed prior to coming to the hospital.  There is a possibility that rings that are unable to be removed may be cut off if they are on the surgical extremity.    Please remove all hair extensions, wigs, clips and any other metal accessories/ ornaments from your hair.  These items may pose a flammable/fire risk in Surgery and must be removed.    Do not shave your surgical area at least 5 days prior to your surgery. The surgical prep will be performed at the hospital according to Infection Control regulations.    Contact Lens must be removed before surgery. Either do not wear the contact lens or bring a case and solution for storage.  Please bring a container for eyeglasses or dentures as required.  Bring any paperwork your physician has provided, such as consent forms,  history and physicals, doctor's orders, etc.   Bring comfortable clothes that are loose fitting to wear upon discharge. Take into consideration the type of surgery being performed.  Maintain your diet as advised per your physician the day prior to surgery.      Adequate rest the night before surgery is advised.   Park in the Parking lot behind the hospital or in the [employer] [facility] across the street from the parking lot. Parking is complimentary.  If you will be discharged the same day as your procedure, please arrange for a responsible adult to drive you home or to accompany you if traveling by taxi.   YOU WILL NOT BE PERMITTED TO DRIVE OR TO LEAVE THE HOSPITAL ALONE AFTER SURGERY.   If you are being discharged the same day, it is strongly recommended that you arrange for someone to remain with you for the first 24 hrs following your surgery.    The Surgeon will speak to your family/visitor after your surgery regarding the outcome of your surgery and post op  care.  The Surgeon may speak to you after your surgery, but there is a possibility you may not remember the details.  Please check with your family members regarding the conversation with the Surgeon.    We strongly recommend whoever is bringing you home be present for discharge instructions.  This will ensure a thorough understanding for your post op home care.    ALL CHILDREN MUST ALWAYS BE ACCOMPANIED BY AN ADULT.    Visitors-Refer to current Visitor policy handouts.    Thank you for your cooperation.  The Staff of [facility].            Bathing Instructions with Hibiclens    Shower the evening before and morning of your procedure with Hibiclens:  Wash your face with water and your regular face wash/soap  Apply Hibiclens directly on your skin or on a wet washcloth and wash gently. When showering: Move away from the shower stream when applying Hibiclens to avoid rinsing off too soon.  Rinse thoroughly with warm water  Do not dilute Hibiclens        Dry off as usual, do not use any deodorant, powder, body lotions, perfume, after shave or cologne.

## 2022-07-11 ENCOUNTER — TELEPHONE (OUTPATIENT)
Dept: SURGERY | Facility: CLINIC | Age: 52
End: 2022-07-11
Payer: COMMERCIAL

## 2022-07-13 ENCOUNTER — ANESTHESIA (OUTPATIENT)
Dept: SURGERY | Facility: OTHER | Age: 52
End: 2022-07-13
Payer: COMMERCIAL

## 2022-07-13 ENCOUNTER — HOSPITAL ENCOUNTER (OUTPATIENT)
Facility: OTHER | Age: 52
Discharge: HOME | End: 2022-07-13
Attending: SPECIALIST | Admitting: SPECIALIST
Payer: COMMERCIAL

## 2022-07-13 VITALS
HEART RATE: 78 BPM | DIASTOLIC BLOOD PRESSURE: 77 MMHG | TEMPERATURE: 98 F | RESPIRATION RATE: 18 BRPM | OXYGEN SATURATION: 100 % | WEIGHT: 162 LBS | BODY MASS INDEX: 26.03 KG/M2 | HEIGHT: 66 IN | SYSTOLIC BLOOD PRESSURE: 125 MMHG

## 2022-07-13 DIAGNOSIS — K80.20 CALCULUS OF GALLBLADDER WITHOUT CHOLECYSTITIS WITHOUT OBSTRUCTION: ICD-10-CM

## 2022-07-13 DIAGNOSIS — K82.9 GALLBLADDER ATTACK: Primary | ICD-10-CM

## 2022-07-13 PROCEDURE — 63600175 PHARM REV CODE 636 W HCPCS: Performed by: SPECIALIST

## 2022-07-13 PROCEDURE — 71000039 HC RECOVERY, EACH ADD'L HOUR: Performed by: SPECIALIST

## 2022-07-13 PROCEDURE — 71000016 HC POSTOP RECOV ADDL HR: Performed by: SPECIALIST

## 2022-07-13 PROCEDURE — 88304 TISSUE EXAM BY PATHOLOGIST: CPT | Mod: 26,,, | Performed by: STUDENT IN AN ORGANIZED HEALTH CARE EDUCATION/TRAINING PROGRAM

## 2022-07-13 PROCEDURE — 27201423 OPTIME MED/SURG SUP & DEVICES STERILE SUPPLY: Performed by: SPECIALIST

## 2022-07-13 PROCEDURE — 47562 PR LAP,CHOLECYSTECTOMY: ICD-10-PCS | Mod: ,,, | Performed by: SPECIALIST

## 2022-07-13 PROCEDURE — 63600175 PHARM REV CODE 636 W HCPCS: Performed by: NURSE ANESTHETIST, CERTIFIED REGISTERED

## 2022-07-13 PROCEDURE — 63600175 PHARM REV CODE 636 W HCPCS: Performed by: ANESTHESIOLOGY

## 2022-07-13 PROCEDURE — 37000008 HC ANESTHESIA 1ST 15 MINUTES: Performed by: SPECIALIST

## 2022-07-13 PROCEDURE — 25000003 PHARM REV CODE 250: Performed by: SPECIALIST

## 2022-07-13 PROCEDURE — 88304 TISSUE EXAM BY PATHOLOGIST: CPT | Performed by: STUDENT IN AN ORGANIZED HEALTH CARE EDUCATION/TRAINING PROGRAM

## 2022-07-13 PROCEDURE — 36000708 HC OR TIME LEV III 1ST 15 MIN: Performed by: SPECIALIST

## 2022-07-13 PROCEDURE — 36000709 HC OR TIME LEV III EA ADD 15 MIN: Performed by: SPECIALIST

## 2022-07-13 PROCEDURE — 88304 PR  SURG PATH,LEVEL III: ICD-10-PCS | Mod: 26,,, | Performed by: STUDENT IN AN ORGANIZED HEALTH CARE EDUCATION/TRAINING PROGRAM

## 2022-07-13 PROCEDURE — 25000003 PHARM REV CODE 250: Performed by: NURSE ANESTHETIST, CERTIFIED REGISTERED

## 2022-07-13 PROCEDURE — 37000009 HC ANESTHESIA EA ADD 15 MINS: Performed by: SPECIALIST

## 2022-07-13 PROCEDURE — 71000033 HC RECOVERY, INTIAL HOUR: Performed by: SPECIALIST

## 2022-07-13 PROCEDURE — 71000015 HC POSTOP RECOV 1ST HR: Performed by: SPECIALIST

## 2022-07-13 PROCEDURE — 25000003 PHARM REV CODE 250: Performed by: ANESTHESIOLOGY

## 2022-07-13 PROCEDURE — 47562 LAPAROSCOPIC CHOLECYSTECTOMY: CPT | Mod: ,,, | Performed by: SPECIALIST

## 2022-07-13 RX ORDER — KETOROLAC TROMETHAMINE 30 MG/ML
INJECTION, SOLUTION INTRAMUSCULAR; INTRAVENOUS
Status: DISCONTINUED | OUTPATIENT
Start: 2022-07-13 | End: 2022-07-13

## 2022-07-13 RX ORDER — KETAMINE HCL IN 0.9 % NACL 50 MG/5 ML
SYRINGE (ML) INTRAVENOUS
Status: DISCONTINUED | OUTPATIENT
Start: 2022-07-13 | End: 2022-07-13

## 2022-07-13 RX ORDER — LIDOCAINE HYDROCHLORIDE 10 MG/ML
0.5 INJECTION, SOLUTION EPIDURAL; INFILTRATION; INTRACAUDAL; PERINEURAL ONCE
Status: DISCONTINUED | OUTPATIENT
Start: 2022-07-13 | End: 2022-07-13 | Stop reason: HOSPADM

## 2022-07-13 RX ORDER — FENTANYL CITRATE 50 UG/ML
INJECTION, SOLUTION INTRAMUSCULAR; INTRAVENOUS
Status: DISCONTINUED | OUTPATIENT
Start: 2022-07-13 | End: 2022-07-13

## 2022-07-13 RX ORDER — CIPROFLOXACIN 2 MG/ML
400 INJECTION, SOLUTION INTRAVENOUS ONCE
Status: COMPLETED | OUTPATIENT
Start: 2022-07-13 | End: 2022-07-13

## 2022-07-13 RX ORDER — LIDOCAINE HCL/PF 100 MG/5ML
SYRINGE (ML) INTRAVENOUS
Status: DISCONTINUED | OUTPATIENT
Start: 2022-07-13 | End: 2022-07-13

## 2022-07-13 RX ORDER — OXYCODONE HYDROCHLORIDE 5 MG/1
5 TABLET ORAL
Status: DISCONTINUED | OUTPATIENT
Start: 2022-07-13 | End: 2022-07-13 | Stop reason: HOSPADM

## 2022-07-13 RX ORDER — HYDROMORPHONE HYDROCHLORIDE 2 MG/ML
0.4 INJECTION, SOLUTION INTRAMUSCULAR; INTRAVENOUS; SUBCUTANEOUS EVERY 5 MIN PRN
Status: DISCONTINUED | OUTPATIENT
Start: 2022-07-13 | End: 2022-07-13 | Stop reason: HOSPADM

## 2022-07-13 RX ORDER — HYDROCODONE BITARTRATE AND ACETAMINOPHEN 7.5; 325 MG/1; MG/1
1 TABLET ORAL EVERY 6 HOURS PRN
Qty: 24 TABLET | Refills: 0 | Status: SHIPPED | OUTPATIENT
Start: 2022-07-13 | End: 2022-07-22

## 2022-07-13 RX ORDER — PROPOFOL 10 MG/ML
VIAL (ML) INTRAVENOUS CONTINUOUS PRN
Status: DISCONTINUED | OUTPATIENT
Start: 2022-07-13 | End: 2022-07-13

## 2022-07-13 RX ORDER — ACETAMINOPHEN 500 MG
1000 TABLET ORAL
Status: COMPLETED | OUTPATIENT
Start: 2022-07-13 | End: 2022-07-13

## 2022-07-13 RX ORDER — EPHEDRINE SULFATE 50 MG/ML
INJECTION, SOLUTION INTRAVENOUS
Status: DISCONTINUED | OUTPATIENT
Start: 2022-07-13 | End: 2022-07-13

## 2022-07-13 RX ORDER — MEPERIDINE HYDROCHLORIDE 25 MG/ML
12.5 INJECTION INTRAMUSCULAR; INTRAVENOUS; SUBCUTANEOUS ONCE AS NEEDED
Status: DISCONTINUED | OUTPATIENT
Start: 2022-07-13 | End: 2022-07-13 | Stop reason: HOSPADM

## 2022-07-13 RX ORDER — PROCHLORPERAZINE EDISYLATE 5 MG/ML
5 INJECTION INTRAMUSCULAR; INTRAVENOUS EVERY 30 MIN PRN
Status: DISCONTINUED | OUTPATIENT
Start: 2022-07-13 | End: 2022-07-13 | Stop reason: HOSPADM

## 2022-07-13 RX ORDER — SODIUM CHLORIDE 9 MG/ML
INJECTION, SOLUTION INTRAVENOUS CONTINUOUS
Status: DISCONTINUED | OUTPATIENT
Start: 2022-07-13 | End: 2022-07-13 | Stop reason: HOSPADM

## 2022-07-13 RX ORDER — DIPHENHYDRAMINE HYDROCHLORIDE 50 MG/ML
INJECTION INTRAMUSCULAR; INTRAVENOUS
Status: DISCONTINUED | OUTPATIENT
Start: 2022-07-13 | End: 2022-07-13

## 2022-07-13 RX ORDER — PROPOFOL 10 MG/ML
VIAL (ML) INTRAVENOUS
Status: DISCONTINUED | OUTPATIENT
Start: 2022-07-13 | End: 2022-07-13

## 2022-07-13 RX ORDER — LIDOCAINE HYDROCHLORIDE 10 MG/ML
1 INJECTION, SOLUTION EPIDURAL; INFILTRATION; INTRACAUDAL; PERINEURAL ONCE
Status: DISCONTINUED | OUTPATIENT
Start: 2022-07-13 | End: 2022-07-13 | Stop reason: HOSPADM

## 2022-07-13 RX ORDER — DEXAMETHASONE SODIUM PHOSPHATE 4 MG/ML
INJECTION, SOLUTION INTRA-ARTICULAR; INTRALESIONAL; INTRAMUSCULAR; INTRAVENOUS; SOFT TISSUE
Status: DISCONTINUED | OUTPATIENT
Start: 2022-07-13 | End: 2022-07-13

## 2022-07-13 RX ORDER — SCOLOPAMINE TRANSDERMAL SYSTEM 1 MG/1
1 PATCH, EXTENDED RELEASE TRANSDERMAL ONCE
Status: COMPLETED | OUTPATIENT
Start: 2022-07-13 | End: 2022-07-13

## 2022-07-13 RX ORDER — ROCURONIUM BROMIDE 10 MG/ML
INJECTION, SOLUTION INTRAVENOUS
Status: DISCONTINUED | OUTPATIENT
Start: 2022-07-13 | End: 2022-07-13

## 2022-07-13 RX ORDER — ONDANSETRON 2 MG/ML
INJECTION INTRAMUSCULAR; INTRAVENOUS
Status: DISCONTINUED | OUTPATIENT
Start: 2022-07-13 | End: 2022-07-13

## 2022-07-13 RX ORDER — PREGABALIN 50 MG/1
50 CAPSULE ORAL ONCE
Status: COMPLETED | OUTPATIENT
Start: 2022-07-13 | End: 2022-07-13

## 2022-07-13 RX ORDER — SODIUM CHLORIDE, SODIUM LACTATE, POTASSIUM CHLORIDE, CALCIUM CHLORIDE 600; 310; 30; 20 MG/100ML; MG/100ML; MG/100ML; MG/100ML
INJECTION, SOLUTION INTRAVENOUS CONTINUOUS
Status: DISCONTINUED | OUTPATIENT
Start: 2022-07-13 | End: 2022-07-13 | Stop reason: HOSPADM

## 2022-07-13 RX ORDER — BUPIVACAINE HYDROCHLORIDE AND EPINEPHRINE 2.5; 5 MG/ML; UG/ML
INJECTION, SOLUTION EPIDURAL; INFILTRATION; INTRACAUDAL; PERINEURAL
Status: DISCONTINUED | OUTPATIENT
Start: 2022-07-13 | End: 2022-07-13 | Stop reason: HOSPADM

## 2022-07-13 RX ORDER — SODIUM CHLORIDE 0.9 % (FLUSH) 0.9 %
3 SYRINGE (ML) INJECTION
Status: DISCONTINUED | OUTPATIENT
Start: 2022-07-13 | End: 2022-07-13 | Stop reason: HOSPADM

## 2022-07-13 RX ORDER — HALOPERIDOL 5 MG/ML
INJECTION INTRAMUSCULAR
Status: DISCONTINUED | OUTPATIENT
Start: 2022-07-13 | End: 2022-07-13

## 2022-07-13 RX ADMIN — CIPROFLOXACIN 400 MG: 2 INJECTION, SOLUTION INTRAVENOUS at 08:07

## 2022-07-13 RX ADMIN — HALOPERIDOL LACTATE 0.5 MG: 5 INJECTION, SOLUTION INTRAMUSCULAR at 08:07

## 2022-07-13 RX ADMIN — Medication 25 MG: at 08:07

## 2022-07-13 RX ADMIN — CARBOXYMETHYLCELLULOSE SODIUM 2 DROP: 2.5 SOLUTION/ DROPS OPHTHALMIC at 08:07

## 2022-07-13 RX ADMIN — EPHEDRINE SULFATE 10 MG: 50 INJECTION INTRAVENOUS at 08:07

## 2022-07-13 RX ADMIN — ONDANSETRON HYDROCHLORIDE 4 MG: 2 INJECTION INTRAMUSCULAR; INTRAVENOUS at 09:07

## 2022-07-13 RX ADMIN — KETOROLAC TROMETHAMINE 30 MG: 30 INJECTION, SOLUTION INTRAMUSCULAR; INTRAVENOUS at 09:07

## 2022-07-13 RX ADMIN — SCOPOLAMINE 1 PATCH: 1.5 PATCH, EXTENDED RELEASE TRANSDERMAL at 07:07

## 2022-07-13 RX ADMIN — GLYCOPYRROLATE 0.2 MG: 0.2 INJECTION, SOLUTION INTRAMUSCULAR; INTRAVITREAL at 08:07

## 2022-07-13 RX ADMIN — HYDROMORPHONE HYDROCHLORIDE 0.4 MG: 2 INJECTION, SOLUTION INTRAMUSCULAR; INTRAVENOUS; SUBCUTANEOUS at 10:07

## 2022-07-13 RX ADMIN — PREGABALIN 50 MG: 50 CAPSULE ORAL at 07:07

## 2022-07-13 RX ADMIN — FENTANYL CITRATE 50 MCG: 50 INJECTION, SOLUTION INTRAMUSCULAR; INTRAVENOUS at 08:07

## 2022-07-13 RX ADMIN — PROPOFOL 100 MCG/KG/MIN: 10 INJECTION, EMULSION INTRAVENOUS at 08:07

## 2022-07-13 RX ADMIN — SODIUM CHLORIDE, SODIUM LACTATE, POTASSIUM CHLORIDE, AND CALCIUM CHLORIDE: 600; 310; 30; 20 INJECTION, SOLUTION INTRAVENOUS at 07:07

## 2022-07-13 RX ADMIN — FENTANYL CITRATE 50 MCG: 50 INJECTION, SOLUTION INTRAMUSCULAR; INTRAVENOUS at 09:07

## 2022-07-13 RX ADMIN — HYDROMORPHONE HYDROCHLORIDE 0.4 MG: 2 INJECTION, SOLUTION INTRAMUSCULAR; INTRAVENOUS; SUBCUTANEOUS at 09:07

## 2022-07-13 RX ADMIN — DEXAMETHASONE SODIUM PHOSPHATE 8 MG: 4 INJECTION, SOLUTION INTRAMUSCULAR; INTRAVENOUS at 08:07

## 2022-07-13 RX ADMIN — DIPHENHYDRAMINE HYDROCHLORIDE 12.5 MG: 50 INJECTION, SOLUTION INTRAMUSCULAR; INTRAVENOUS at 08:07

## 2022-07-13 RX ADMIN — ROCURONIUM BROMIDE 40 MG: 10 INJECTION, SOLUTION INTRAVENOUS at 08:07

## 2022-07-13 RX ADMIN — PROPOFOL 150 MG: 10 INJECTION, EMULSION INTRAVENOUS at 08:07

## 2022-07-13 RX ADMIN — LIDOCAINE HYDROCHLORIDE 60 MG: 20 INJECTION, SOLUTION INTRAVENOUS at 08:07

## 2022-07-13 RX ADMIN — ACETAMINOPHEN 1000 MG: 500 TABLET, FILM COATED ORAL at 07:07

## 2022-07-13 RX ADMIN — SODIUM CHLORIDE, SODIUM LACTATE, POTASSIUM CHLORIDE, AND CALCIUM CHLORIDE: 600; 310; 30; 20 INJECTION, SOLUTION INTRAVENOUS at 09:07

## 2022-07-13 RX ADMIN — SUGAMMADEX 200 MG: 100 INJECTION, SOLUTION INTRAVENOUS at 09:07

## 2022-07-13 NOTE — OR NURSING
Patient denies any n/v .  Patient states she sometimes feels as if she is seeing black floaters .  Eyes are perlla   No distress noted .  Instructed patient if this persist to notify dr. Hernandez

## 2022-07-13 NOTE — PLAN OF CARE
Esme Louise has met all discharge criteria from Phase II. Vital Signs are stable, ambulating  without difficulty. Discharge instructions given, patient verbalized understanding. Discharged from facility via wheelchair in stable condition.

## 2022-07-13 NOTE — OP NOTE
[facility] - Surgery ([place])  Operative Note    SUMMARY     Surgery Date: 7/13/2022     Surgeon(s) and Role:     * Lui Hernandez Jr., MD - Primary    Assisting Surgeon: None    Pre-op Diagnosis:  Calculus of gallbladder without cholecystitis without obstruction [K80.20]    Post-op Diagnosis:  Post-Op Diagnosis Codes:     * Calculus of gallbladder without cholecystitis without obstruction [K80.20]    Procedure(s) (LRB):  CHOLECYSTECTOMY, LAPAROSCOPIC (N/A)    Anesthesia: General    Description of Procedure:  The patient was brought to the operating room and placed in the supine position.  The abdomen prepped and draped in a sterile fashion.  Small incision made at the umbilicus, a Veress needle inserted, a pneumoperitoneum achieved.  A 10 mm Optiview trocar inserted at the umbilicus.  Under direct observation three 5 mm trocars were inserted:  The 1st in the upper midline, the 2nd in the midclavicular line of the right upper quadrant, the 3rd in the anterior axillary line of the right upper quadrant.  The gallbladder was visualized and seen to be chronically inflamed.  The gallbladder was grasped with ratcheted retractors placed through the lateral 5 mm ports.  Using blunt dissection adhesions to the gallbladder were taken down exposing the cystic artery and duct.  These were doubly clamped proximally and then transected.  The gallbladder was removed from liver bed with the aid of the electrocautery.  Under direct observation with the aid of an Endo-Catch the gallbladder was removed through the periumbilical port.  The peritoneal cavity irrigated and inspected.  The pneumoperitoneum released.  The fascia of the umbilicus closed with 0 Vicryl and the skin with 4-0 Monocryl.  The wounds sterilely dressed.  Patient tolerated procedure well left the operating room in good condition.  At the end of procedure all sponge lap and instrument counts were correct.  Estimated blood loss-minimal    Estimated Blood Loss:   Minimal         Specimens:   Specimen (24h ago, onward)             Start     Ordered    07/13/22 0848  Specimen to Pathology, Surgery General Surgery  Once        Comments: Pre-op Diagnosis: Calculus of gallbladder without cholecystitis without obstruction [K80.20]Procedure(s):CHOLECYSTECTOMY, LAPAROSCOPIC Number of specimens: 1Name of specimens: 1. GALLBLADDER WITH STONE     References:    Click here for ordering Quick Tip   Question Answer Comment   Procedure Type: General Surgery    Specimen Class: Routine/Screening    Which provider would you like to cc? JANA MONTES JR    Release to patient Immediate        07/13/22 0909                    SUMMARY     Admit Date:     Discharge Date and Time:  07/13/2022 9:41 AM    Hospital Course (synopsis of major diagnoses, care, treatment, and services provided during the course of the hospital stay):  Benign     Final Diagnosis: Post-Op Diagnosis Codes:     * Calculus of gallbladder without cholecystitis without obstruction [K80.20]    Disposition: Home or Self Care    Follow Up/Patient Instructions:  Office 10-14 days    Medications:  Reconciled Home Medications:      Medication List      START taking these medications    HYDROcodone-acetaminophen 7.5-325 mg per tablet  Commonly known as: NORCO  Take 1 tablet by mouth every 6 (six) hours as needed for Pain.        CONTINUE taking these medications    BOTOX INJ  Inject as directed.     butalbital-acetaminophen-caff -40 mg Cap  TK ONE C PO Q 4 HOURS     clonazePAM 1 MG tablet  Commonly known as: KlonoPIN     diclofenac 50 MG EC tablet  Commonly known as: VOLTAREN  TK 1 T PO BID     dicyclomine 20 mg tablet  Commonly known as: BENTYL  Take 20 mg by mouth daily as needed.     estradioL 1 MG tablet  Commonly known as: ESTRACE  Take 1 tablet (1 mg total) by mouth once daily.     fenofibrate 160 MG Tab  Take 1 tablet (160 mg total) by mouth once daily.     ferrous sulfate 325 (65 FE) MG EC tablet  Take 1 tablet (325 mg  total) by mouth 3 (three) times daily with meals.     fish oil-omega-3 fatty acids 300-1,000 mg capsule  Take by mouth once daily.     levothyroxine 100 MCG tablet  Commonly known as: SYNTHROID  Take 1 tablet (100 mcg total) by mouth once daily.     losartan 50 MG tablet  Commonly known as: COZAAR  Take 1 tablet (50 mg total) by mouth once daily.     methotrexate 2.5 MG Tab  Take by mouth every 7 days.     NuvigiL 250 mg tablet  Generic drug: armodafiniL  TK 1 T PO QD     ORENCIA (WITH MALTOSE) IV  Inject into the vein.     pantoprazole 20 MG tablet  Commonly known as: PROTONIX  Take 1 tablet (20 mg total) by mouth once daily.     pravastatin 40 MG tablet  Commonly known as: PRAVACHOL  Take 1 tablet (40 mg total) by mouth once daily.     progesterone 100 MG capsule  Commonly known as: PROMETRIUM  Take 1 capsule (100 mg total) by mouth once daily.     propranoloL 10 MG tablet  Commonly known as: INDERAL     RHEUMATE ORAL  Take 1 tablet by mouth Daily. stopped     sodium chloride 0.9% SolP 100 mL with abatacept (with maltose) 250 mg SolR  abatacept   750MG 1X MONTH     tiZANidine 2 mg Cap  Take 2 mg by mouth as needed.     VIIBRYD 10 mg Tab tablet  Generic drug: vilazodone  Take 10 mg by mouth once daily.     ZOLMitriptan 5 MG tablet  Commonly known as: ZOMIG  TK 1 T PO  ONCE PRN HA        ASK your doctor about these medications    cyclobenzaprine 15 MG 24 hr capsule  Commonly known as: AMRIX  TAKE 1 CAPSULE BY MOUTH DAILY AT 6 IN THE EVENING compounded especially for you by the pharmacist          Discharge Procedure Orders   Diet general     Call MD for:  temperature >100.4     Call MD for:  persistent nausea and vomiting     Call MD for:  severe uncontrolled pain     Call MD for:  redness, tenderness, or signs of infection (pain, swelling, redness, odor or green/yellow discharge around incision site)     Lifting restrictions   Order Comments: 10 lb     Wound care routine (specify)   Order Comments: Wound care  routine:  Leave Steri-Strips intact  May shower

## 2022-07-13 NOTE — ANESTHESIA PROCEDURE NOTES
Intubation    Date/Time: 7/13/2022 8:24 AM  Performed by: Diana Ortega CRNA  Authorized by: Vani Mayfield MD     Intubation:     Induction:  Intravenous    Intubated:  Postinduction    Mask Ventilation:  Easy with oral airway    Attempts:  1    Attempted By:  CRNA    Method of Intubation:  Video laryngoscopy    Blade:  Ann 3    Laryngeal View Grade: Grade I - full view of cords      Difficult Airway Encountered?: No      Complications:  None    Airway Device:  Oral endotracheal tube    Airway Device Size:  7.0    Style/Cuff Inflation:  Cuffed    Tube secured:  22    Secured at:  The lips    Placement Verified By:  Capnometry    Complicating Factors:  Anterior larynx and small mouth    Findings Post-Intubation:  BS equal bilateral and atraumatic/condition of teeth unchanged

## 2022-07-13 NOTE — ANESTHESIA POSTPROCEDURE EVALUATION
Anesthesia Post Evaluation    Patient: Esme Louise    Procedure(s) Performed: Procedure(s) (LRB):  CHOLECYSTECTOMY, LAPAROSCOPIC (N/A)    Final Anesthesia Type: general      Patient location during evaluation: PACU  Patient participation: Yes- Able to Participate  Level of consciousness: awake and alert  Post-procedure vital signs: reviewed and stable  Pain management: adequate  Airway patency: patent    PONV status at discharge: No PONV  Anesthetic complications: no      Cardiovascular status: blood pressure returned to baseline and stable  Respiratory status: unassisted, spontaneous ventilation and room air  Hydration status: euvolemic  Follow-up not needed.          Vitals Value Taken Time   /62 07/13/22 1132   Temp 37.2 °C (98.9 °F) 07/13/22 1132   Pulse 73 07/13/22 1137   Resp 16 07/13/22 1132   SpO2 100 % 07/13/22 1137   Vitals shown include unvalidated device data.      No case tracking events are documented in the log.      Pain/Izaiah Score: Pain Rating Prior to Med Admin: 7 (7/13/2022 10:58 AM)  Pain Rating Post Med Admin: 5 (7/13/2022 11:27 AM)  Izaiah Score: 9 (7/13/2022 11:33 AM)

## 2022-07-13 NOTE — H&P
History & Physical     SUBJECTIVE:      History of Present Illness:  Patient is a 52 y.o. female presents with history of epigastric and right upper quadrant pain radiating to the back.  No fever, chills, jaundice.  The patient worked up by GI Medicine and found to have gallstones on ultrasound.      Chief Complaint   Patient presents with    Gall Bladder Problem              Review of patient's allergies indicates:   Allergen Reactions    Pcn [penicillins] Swelling    Zoloft [sertraline] Hives and Swelling         Current Medications          Current Outpatient Medications   Medication Sig Dispense Refill    abatacept/maltose (ORENCIA, WITH MALTOSE, IV) Inject into the vein.        butalbital-acetaminophen-caff -40 mg Cap TK ONE C PO Q 4 HOURS   0    clonazePAM (KLONOPIN) 1 MG tablet          cyclobenzaprine (AMRIX) 15 MG 24 hr capsule TAKE 1 CAPSULE BY MOUTH DAILY AT 6 IN THE EVENING compounded especially for you by the pharmacist   3    diclofenac (VOLTAREN) 50 MG EC tablet TK 1 T PO BID   0    estradioL (ESTRACE) 1 MG tablet Take 1 tablet (1 mg total) by mouth once daily. 90 tablet 1    fenofibrate 160 MG Tab Take 1 tablet (160 mg total) by mouth once daily. 90 tablet 3    ferrous sulfate 325 (65 FE) MG EC tablet Take 1 tablet (325 mg total) by mouth 3 (three) times daily with meals. 180 tablet 1    levothyroxine (SYNTHROID) 100 MCG tablet Take 1 tablet (100 mcg total) by mouth once daily. 90 tablet 3    losartan (COZAAR) 50 MG tablet Take 1 tablet (50 mg total) by mouth once daily. 90 tablet 3    methotrexate 2.5 MG Tab Take by mouth every 7 days.        NUVIGIL 250 mg tablet TK 1 T PO QD   5    onabotulinumtoxinA (BOTOX INJ) Inject as directed.        pantoprazole (PROTONIX) 20 MG tablet Take 1 tablet (20 mg total) by mouth once daily. 90 tablet 3    pravastatin (PRAVACHOL) 40 MG tablet Take 1 tablet (40 mg total) by mouth once daily. 90 tablet 0    progesterone (PROMETRIUM) 100 MG  capsule Take 1 capsule (100 mg total) by mouth once daily. 90 capsule 1    propranolol (INDERAL) 10 MG tablet          sodium chloride 0.9% SolP 100 mL with abatacept (with maltose) 250 mg SolR abatacept   750MG 1X MONTH        tizanidine 2 mg Cap Take 2 mg by mouth as needed.        VIIBRYD 10 mg Tab tablet Take 10 mg by mouth once daily.        zolmitriptan (ZOMIG) 5 MG tablet TK 1 T PO  ONCE PRN HA   3      No current facility-administered medications for this visit.                 Past Medical History:   Diagnosis Date    Anemia      Anxiety      Arthritis      Arthritis      Breast disorder      Depression      Endometriosis      Headaches, cluster      Hypertension      Mental disorder      Occipital neuralgia      Premature ovarian failure      Thyroid disease              Past Surgical History:   Procedure Laterality Date    BREAST LUMPECTOMY Right       benign     ENDOMETRIAL ABLATION         endometriosis     OCCIPITAL NERVE STIMULATOR INSERTION   2015    OOPHORECTOMY Right       d/t endometriosis             Family History   Problem Relation Age of Onset    Autoimmune disease Maternal Uncle      Diabetes Maternal Grandmother      Miscarriages / Stillbirths Maternal Grandmother      Colon cancer Maternal Grandfather 70    Schizophrenia Father      Suicide Father      Breast cancer Neg Hx      Eclampsia Neg Hx      Hypertension Neg Hx      Ovarian cancer Neg Hx        Social History            Tobacco Use    Smoking status: Former Smoker       Packs/day: 0.75       Years: 5.00       Pack years: 3.75       Types: Cigarettes       Quit date: 2006       Years since quittin.4    Smokeless tobacco: Never Used    Tobacco comment: smoke cloves in the past   Substance Use Topics    Alcohol use: Yes       Comment: worsen migraines     Drug use: No         Review of Systems:  Review of Systems   Constitutional: Negative for chills, diaphoresis, fever and unexpected  "weight change.   HENT: Negative.    Respiratory: Negative.    Cardiovascular: Negative.    Gastrointestinal: Positive for abdominal pain. Negative for blood in stool, nausea and vomiting.   Genitourinary: Negative.    Musculoskeletal: Negative.    Skin: Negative.    Neurological: Negative.    Hematological: Negative.    Psychiatric/Behavioral: Negative.          OBJECTIVE:      Vital Signs (Most Recent)  BP: 136/85 (06/27/22 0903)  SpO2: 99 % (06/27/22 0903)  5' 6" (1.676 m)  73.5 kg (162 lb)      Physical Exam:  Physical Exam  Constitutional:       General: She is not in acute distress.     Appearance: Normal appearance. She is not toxic-appearing.   HENT:      Head: Normocephalic and atraumatic.   Eyes:      General: No scleral icterus.        Right eye: No discharge.         Left eye: No discharge.      Extraocular Movements: Extraocular movements intact.      Conjunctiva/sclera: Conjunctivae normal.   Cardiovascular:      Rate and Rhythm: Normal rate and regular rhythm.      Heart sounds: No murmur heard.    No friction rub. No gallop.   Pulmonary:      Effort: Pulmonary effort is normal. No respiratory distress.      Breath sounds: Normal breath sounds. No wheezing or rales.   Abdominal:      General: There is no distension.      Palpations: Abdomen is soft. There is no mass.      Tenderness: There is no abdominal tenderness. There is no guarding or rebound.   Musculoskeletal:         General: No swelling, tenderness, deformity or signs of injury. Normal range of motion.      Cervical back: Neck supple. No rigidity or tenderness.   Skin:     General: Skin is warm and dry.      Coloration: Skin is not jaundiced.      Findings: No bruising, lesion or rash.   Neurological:      General: No focal deficit present.      Mental Status: She is alert and oriented to person, place, and time.      Motor: No weakness.      Coordination: Coordination normal.      Gait: Gait normal.   Psychiatric:         Mood and Affect: " Mood normal.         Behavior: Behavior normal.         Thought Content: Thought content normal.         Judgment: Judgment normal.            Laboratory  Liver profile within normal limits     Diagnostic Results:  Ultrasound shows cholelithiasis     ASSESSMENT/PLAN:      Symptomatic gallbladder disease/cholecystectomy

## 2022-07-13 NOTE — DISCHARGE INSTRUCTIONS
LAPROSCOPIC CHOLECYSTECTOMY    Remove bandaids in 24 hours, if present. There may be white tape directly on your incisions (steri-strips)  which will fall off on their own in about 10 days.  You may remove them after 14 days if they have not yet fallen off.  If incisions are closed with glue, this will peel off on its own; do not peel.    Continue to deep breathe after you go home. This will help to prevent lung infection. Press a pillow across your abdomen when coughing or deep breathing to help minimize discomfort.    May shower starting tomorrow.  No tub bath, soaking in hot tub or swimming pool until cleared by physician.    No heavy lifting, strenuous exercise or strenuous activity until cleared by your surgeon.    Try to walk several times a day.  You may increase the pace and distance as tolerated.      Rest when you are tired.    Stay hydrated:  drink 8-10 glasses of fluid a day.    Avoid spicy or greasy foods.    Notify your surgeon for any of the following:      Signs of infection:  fever 100.4 or higher, excessive redness at incision sites, yellow green drainage or foul odor from incisions.  Persistent nausea and vomiting, dark urine, yellowing of skin or eyes  No bowel movement by 3 days after surgery or bowel movements that are white or gray in color    Seek emergency attention for sudden onset of chest pain, rapid heartbeat shortness of breath, pain or tenderness in calf.   Anesthesia: After Your Surgery  Youve just had surgery. During surgery, you received medication called anesthesia to keep you comfortable and pain-free. After surgery, you may experience some pain or nausea. This is common. Here are some tips for feeling better and recovering after surgery.    Going home  Your doctor or nurse will show you how to take care of yourself when you go home. He or she will also answer your questions. Have an adult family member or friend drive you home. For the first 24 hours after your surgery:  Do not  drive or use heavy equipment.  Do not make important decisions or sign legal documents.  Avoid alcohol.  Have someone stay with you, if needed. He or she can watch for problems and help keep you safe.  Take your time getting up from a seated or lying position. You may experience dizziness for 24 hours  Be sure to keep all follow-up appointments with your doctor. And rest after your procedure for as long as your doctor tells you to.    Coping with pain  If you have pain after surgery, pain medication will help you feel better. Take it as directed, before pain becomes severe. Also, ask your doctor or pharmacist about other ways to control pain, such as with heat, ice, and relaxation. And follow any other instructions your surgeon or nurse gives you.    URINARY RETENTION  Should you experience a decrease in your urine output or are unable to urinate following surgery, this can be due to the medications given during surgery.  We recommend you going to the nearest Emergency Department.    Tips for taking pain medication  To get the best relief possible, remember these points:  Pain medications can upset your stomach. Taking them with a little food may help.  Most pain relievers taken by mouth need at least 20 to 30 minutes to take effect.  Taking medication on a schedule can help you remember to take it. Try to time your medication so that you can take it before beginning an activity, such as dressing, walking, or sitting down for dinner.  Constipation is a common side effect of pain medications. Contact your doctor before taking any medications like laxatives or stool softeners to help relieve constipation. Also ask about any dietary restrictions, because drinking lots of fluids and eating foods like fruits and vegetables that are high in fiber can also help. Remember, dont take laxatives unless your surgeon has prescribed them.  Mixing alcohol and pain medication can cause dizziness and slow your breathing. It can  even be fatal. Dont drink alcohol while taking pain medication.  Pain medication can slow your reflexes. Dont drive or operate machinery while taking pain medication.  If your health care provider tells you to take acetaminophen to help relieve your pain, ask him or her how much you are supposed to take each day. (Acetaminophen is the generic name for Tylenol and other brand-name pain relievers.) Acetaminophen or other pain relievers may interact with your prescription medicines or other over-the-counter (OTC) drugs. Some prescription medications contain acetaminophen along with other active ingredients. Using both prescription and OTC acetaminophen for pain can cause you to overdose. The FDA recommends that you read the labels on your OTC medications carefully. This will help you to clearly understand the list of active ingredients, dosing instructions, and any warnings. It may also help you avoid taking too much acetaminophen. If you have questions or don't understand the information, ask your pharmacist or health care provider to explain it to you before you take the OTC medication.    Managing nausea  Some people have an upset stomach after surgery. This is often due to anesthesia, pain, pain medications, or the stress of surgery. The following tips will help you manage nausea and get good nutrition as you recover. If you were on a special diet before surgery, ask your doctor if you should follow it during recovery. These tips may help:  Dont push yourself to eat. Your body will tell you when to eat and how much.  Start off with clear liquids and soup. They are easier to digest.  Progress to semi-solid foods (mashed potatoes, applesauce, and gelatin) as you feel ready.  Slowly move to solid foods. Dont eat fatty, rich, or spicy foods at first.  Dont force yourself to have three large meals a day. Instead, eat smaller amounts more often.  Take pain medications with a small amount of solid food, such as  crackers or toast to avoid nausea.      Call your surgeon if    You feel too sleepy, dizzy, or groggy (medication may be too strong).  You have side effects like nausea, vomiting, or skin changes (rash, itching, or hives).   © 1159-4818 [email or web address]. [address] [place] [address], [place] [address]. All rights reserved. This information is not intended as a substitute for professional medical care. Always follow your healthcare professional's instructions.

## 2022-07-13 NOTE — TRANSFER OF CARE
"Anesthesia Transfer of Care Note    Patient: Esme Louise    Procedure(s) Performed: Procedure(s) (LRB):  CHOLECYSTECTOMY, LAPAROSCOPIC (N/A)    Patient location: PACU    Anesthesia Type: general    Transport from OR: Transported from OR on 2-3 L/min O2 by NC with adequate spontaneous ventilation    Post pain: adequate analgesia    Post assessment: no apparent anesthetic complications    Post vital signs: stable    Level of consciousness: awake and alert    Nausea/Vomiting: no nausea/vomiting    Complications: none    Transfer of care protocol was followed      Last vitals:   Visit Vitals  /66 (BP Location: Left arm, Patient Position: Lying)   Pulse 65   Temp 36.6 °C (97.9 °F) (Oral)   Resp 18   Ht 5' 6" (1.676 m)   Wt 73.5 kg (162 lb)   LMP 03/20/2018 (Approximate)   SpO2 98%   Breastfeeding No   BMI 26.15 kg/m²     "

## 2022-07-18 ENCOUNTER — PATIENT MESSAGE (OUTPATIENT)
Dept: PAIN MEDICINE | Facility: CLINIC | Age: 52
End: 2022-07-18
Payer: COMMERCIAL

## 2022-07-18 LAB
FINAL PATHOLOGIC DIAGNOSIS: NORMAL
GROSS: NORMAL
Lab: NORMAL

## 2022-07-22 ENCOUNTER — OFFICE VISIT (OUTPATIENT)
Dept: INTERNAL MEDICINE | Facility: CLINIC | Age: 52
End: 2022-07-22
Payer: COMMERCIAL

## 2022-07-22 VITALS
HEART RATE: 80 BPM | DIASTOLIC BLOOD PRESSURE: 70 MMHG | BODY MASS INDEX: 25.4 KG/M2 | HEIGHT: 66 IN | OXYGEN SATURATION: 97 % | SYSTOLIC BLOOD PRESSURE: 132 MMHG | WEIGHT: 158.06 LBS

## 2022-07-22 DIAGNOSIS — G89.29 CHRONIC RIGHT SHOULDER PAIN: ICD-10-CM

## 2022-07-22 DIAGNOSIS — R53.83 FATIGUE, UNSPECIFIED TYPE: ICD-10-CM

## 2022-07-22 DIAGNOSIS — Z00.00 ANNUAL PHYSICAL EXAM: ICD-10-CM

## 2022-07-22 DIAGNOSIS — I10 ESSENTIAL HYPERTENSION: Primary | ICD-10-CM

## 2022-07-22 DIAGNOSIS — M25.511 CHRONIC RIGHT SHOULDER PAIN: ICD-10-CM

## 2022-07-22 DIAGNOSIS — D75.839 THROMBOCYTOSIS: ICD-10-CM

## 2022-07-22 DIAGNOSIS — M85.80 OSTEOPENIA, UNSPECIFIED LOCATION: ICD-10-CM

## 2022-07-22 DIAGNOSIS — E66.3 OVERWEIGHT WITH BODY MASS INDEX (BMI) 25.0-29.9: ICD-10-CM

## 2022-07-22 DIAGNOSIS — F43.10 PTSD (POST-TRAUMATIC STRESS DISORDER): ICD-10-CM

## 2022-07-22 DIAGNOSIS — D50.9 IRON DEFICIENCY ANEMIA, UNSPECIFIED IRON DEFICIENCY ANEMIA TYPE: ICD-10-CM

## 2022-07-22 DIAGNOSIS — E03.9 HYPOTHYROIDISM, UNSPECIFIED TYPE: ICD-10-CM

## 2022-07-22 DIAGNOSIS — K76.0 HEPATIC STEATOSIS: ICD-10-CM

## 2022-07-22 PROCEDURE — 3044F PR MOST RECENT HEMOGLOBIN A1C LEVEL <7.0%: ICD-10-PCS | Mod: CPTII,S$GLB,, | Performed by: INTERNAL MEDICINE

## 2022-07-22 PROCEDURE — 99999 PR PBB SHADOW E&M-EST. PATIENT-LVL V: ICD-10-PCS | Mod: PBBFAC,,, | Performed by: INTERNAL MEDICINE

## 2022-07-22 PROCEDURE — 3008F BODY MASS INDEX DOCD: CPT | Mod: CPTII,S$GLB,, | Performed by: INTERNAL MEDICINE

## 2022-07-22 PROCEDURE — 3075F PR MOST RECENT SYSTOLIC BLOOD PRESS GE 130-139MM HG: ICD-10-PCS | Mod: CPTII,S$GLB,, | Performed by: INTERNAL MEDICINE

## 2022-07-22 PROCEDURE — 3078F PR MOST RECENT DIASTOLIC BLOOD PRESSURE < 80 MM HG: ICD-10-PCS | Mod: CPTII,S$GLB,, | Performed by: INTERNAL MEDICINE

## 2022-07-22 PROCEDURE — 99396 PR PREVENTIVE VISIT,EST,40-64: ICD-10-PCS | Mod: S$GLB,,, | Performed by: INTERNAL MEDICINE

## 2022-07-22 PROCEDURE — 99999 PR PBB SHADOW E&M-EST. PATIENT-LVL V: CPT | Mod: PBBFAC,,, | Performed by: INTERNAL MEDICINE

## 2022-07-22 PROCEDURE — 1159F PR MEDICATION LIST DOCUMENTED IN MEDICAL RECORD: ICD-10-PCS | Mod: CPTII,S$GLB,, | Performed by: INTERNAL MEDICINE

## 2022-07-22 PROCEDURE — 99396 PREV VISIT EST AGE 40-64: CPT | Mod: S$GLB,,, | Performed by: INTERNAL MEDICINE

## 2022-07-22 PROCEDURE — 3078F DIAST BP <80 MM HG: CPT | Mod: CPTII,S$GLB,, | Performed by: INTERNAL MEDICINE

## 2022-07-22 PROCEDURE — 3008F PR BODY MASS INDEX (BMI) DOCUMENTED: ICD-10-PCS | Mod: CPTII,S$GLB,, | Performed by: INTERNAL MEDICINE

## 2022-07-22 PROCEDURE — 1159F MED LIST DOCD IN RCRD: CPT | Mod: CPTII,S$GLB,, | Performed by: INTERNAL MEDICINE

## 2022-07-22 PROCEDURE — 3075F SYST BP GE 130 - 139MM HG: CPT | Mod: CPTII,S$GLB,, | Performed by: INTERNAL MEDICINE

## 2022-07-22 PROCEDURE — 3044F HG A1C LEVEL LT 7.0%: CPT | Mod: CPTII,S$GLB,, | Performed by: INTERNAL MEDICINE

## 2022-07-22 PROCEDURE — 1160F PR REVIEW ALL MEDS BY PRESCRIBER/CLIN PHARMACIST DOCUMENTED: ICD-10-PCS | Mod: CPTII,S$GLB,, | Performed by: INTERNAL MEDICINE

## 2022-07-22 PROCEDURE — 4010F PR ACE/ARB THEARPY RXD/TAKEN: ICD-10-PCS | Mod: CPTII,S$GLB,, | Performed by: INTERNAL MEDICINE

## 2022-07-22 PROCEDURE — 1160F RVW MEDS BY RX/DR IN RCRD: CPT | Mod: CPTII,S$GLB,, | Performed by: INTERNAL MEDICINE

## 2022-07-22 PROCEDURE — 4010F ACE/ARB THERAPY RXD/TAKEN: CPT | Mod: CPTII,S$GLB,, | Performed by: INTERNAL MEDICINE

## 2022-07-22 RX ORDER — VILAZODONE HYDROCHLORIDE 10 MG/1
10 TABLET ORAL DAILY
Qty: 7 TABLET | Refills: 0 | Status: SHIPPED | OUTPATIENT
Start: 2022-07-22 | End: 2023-08-17

## 2022-07-22 RX ORDER — ERGOCALCIFEROL 1.25 MG/1
50000 CAPSULE ORAL WEEKLY
Qty: 12 CAPSULE | Refills: 3 | Status: SHIPPED | OUTPATIENT
Start: 2022-07-22 | End: 2022-11-22 | Stop reason: SDUPTHER

## 2022-07-22 RX ORDER — FERROUS SULFATE 325(65) MG
325 TABLET, DELAYED RELEASE (ENTERIC COATED) ORAL DAILY
Qty: 90 TABLET | Refills: 3 | Status: SHIPPED | OUTPATIENT
Start: 2022-07-22 | End: 2022-11-22 | Stop reason: SDUPTHER
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
## 2022-07-27 ENCOUNTER — OFFICE VISIT (OUTPATIENT)
Dept: SURGERY | Facility: CLINIC | Age: 52
End: 2022-07-27
Attending: SPECIALIST
Payer: COMMERCIAL

## 2022-07-27 VITALS — SYSTOLIC BLOOD PRESSURE: 130 MMHG | DIASTOLIC BLOOD PRESSURE: 73 MMHG | OXYGEN SATURATION: 97 %

## 2022-07-27 DIAGNOSIS — K82.9 GALLBLADDER ATTACK: Primary | ICD-10-CM

## 2022-07-27 PROCEDURE — 1159F MED LIST DOCD IN RCRD: CPT | Mod: CPTII,S$GLB,, | Performed by: SPECIALIST

## 2022-07-27 PROCEDURE — 1159F PR MEDICATION LIST DOCUMENTED IN MEDICAL RECORD: ICD-10-PCS | Mod: CPTII,S$GLB,, | Performed by: SPECIALIST

## 2022-07-27 PROCEDURE — 3044F HG A1C LEVEL LT 7.0%: CPT | Mod: CPTII,S$GLB,, | Performed by: SPECIALIST

## 2022-07-27 PROCEDURE — 99999 PR PBB SHADOW E&M-EST. PATIENT-LVL IV: CPT | Mod: PBBFAC,,, | Performed by: SPECIALIST

## 2022-07-27 PROCEDURE — 3078F DIAST BP <80 MM HG: CPT | Mod: CPTII,S$GLB,, | Performed by: SPECIALIST

## 2022-07-27 PROCEDURE — 4010F ACE/ARB THERAPY RXD/TAKEN: CPT | Mod: CPTII,S$GLB,, | Performed by: SPECIALIST

## 2022-07-27 PROCEDURE — 99024 POSTOP FOLLOW-UP VISIT: CPT | Mod: S$GLB,,, | Performed by: SPECIALIST

## 2022-07-27 PROCEDURE — 99999 PR PBB SHADOW E&M-EST. PATIENT-LVL IV: ICD-10-PCS | Mod: PBBFAC,,, | Performed by: SPECIALIST

## 2022-07-27 PROCEDURE — 3044F PR MOST RECENT HEMOGLOBIN A1C LEVEL <7.0%: ICD-10-PCS | Mod: CPTII,S$GLB,, | Performed by: SPECIALIST

## 2022-07-27 PROCEDURE — 1160F RVW MEDS BY RX/DR IN RCRD: CPT | Mod: CPTII,S$GLB,, | Performed by: SPECIALIST

## 2022-07-27 PROCEDURE — 3078F PR MOST RECENT DIASTOLIC BLOOD PRESSURE < 80 MM HG: ICD-10-PCS | Mod: CPTII,S$GLB,, | Performed by: SPECIALIST

## 2022-07-27 PROCEDURE — 1160F PR REVIEW ALL MEDS BY PRESCRIBER/CLIN PHARMACIST DOCUMENTED: ICD-10-PCS | Mod: CPTII,S$GLB,, | Performed by: SPECIALIST

## 2022-07-27 PROCEDURE — 4010F PR ACE/ARB THEARPY RXD/TAKEN: ICD-10-PCS | Mod: CPTII,S$GLB,, | Performed by: SPECIALIST

## 2022-07-27 PROCEDURE — 3075F PR MOST RECENT SYSTOLIC BLOOD PRESS GE 130-139MM HG: ICD-10-PCS | Mod: CPTII,S$GLB,, | Performed by: SPECIALIST

## 2022-07-27 PROCEDURE — 99024 PR POST-OP FOLLOW-UP VISIT: ICD-10-PCS | Mod: S$GLB,,, | Performed by: SPECIALIST

## 2022-07-27 PROCEDURE — 3075F SYST BP GE 130 - 139MM HG: CPT | Mod: CPTII,S$GLB,, | Performed by: SPECIALIST

## 2022-07-27 NOTE — PROGRESS NOTES
Status post laparoscopic cholecystectomy for symptomatic gallbladder disease 07/13/2022  No complaints    PE  Anicteric  Abdomen-soft, nontender, incisions healing without evidence of infection    Impression/plan  Surgically stable  RTC p.r.n.

## 2022-07-31 ENCOUNTER — PATIENT MESSAGE (OUTPATIENT)
Dept: SURGERY | Facility: CLINIC | Age: 52
End: 2022-07-31
Payer: COMMERCIAL

## 2022-08-04 ENCOUNTER — PATIENT MESSAGE (OUTPATIENT)
Dept: PAIN MEDICINE | Facility: CLINIC | Age: 52
End: 2022-08-04
Payer: COMMERCIAL

## 2022-08-04 ENCOUNTER — TELEPHONE (OUTPATIENT)
Dept: PAIN MEDICINE | Facility: CLINIC | Age: 52
End: 2022-08-04
Payer: COMMERCIAL

## 2022-08-04 NOTE — TELEPHONE ENCOUNTER
Staff called pt to confirm appt is on 08/11 @2:30 p for Botox. Staff also stated to pt that we will update pt. Pt verbalized understanding and confirmed. SG

## 2022-08-09 ENCOUNTER — TELEPHONE (OUTPATIENT)
Dept: PAIN MEDICINE | Facility: CLINIC | Age: 52
End: 2022-08-09
Payer: COMMERCIAL

## 2022-08-09 DIAGNOSIS — G24.3 ISOLATED CERVICAL DYSTONIA: Primary | ICD-10-CM

## 2022-08-09 DIAGNOSIS — M62.838 MUSCLE SPASM: ICD-10-CM

## 2022-08-09 NOTE — TELEPHONE ENCOUNTER
Patient was contacted staff left a message on VM informing patient of the denial for her botox appt 08/11. Patient was informed to contact the office regarding this matter

## 2022-08-09 NOTE — TELEPHONE ENCOUNTER
----- Message from Mari Barrios sent at 8/9/2022  4:00 PM CDT -----  Regarding: RT A MISSED CALL   Type:  Patient Returning Call    Who Called: URBEN MERA [[id number]]    Who Left Message for Patient:UNKNOWN    Does the patient know what this is regarding? yes    Best Call Back Number:[phone]    Additional Information:

## 2022-08-09 NOTE — TELEPHONE ENCOUNTER
Patient was contacted and informed that her insurance carrier denied her botox due to them recommending that she try Emgality or Aimovig. Staff reached out to the Pre Service and asked if the diagnosis was changed from migraines to cervical dystonia would the patient have a better chance for approval.     Staff resubmitted the bcn with new diagnosis to the provider

## 2022-08-16 ENCOUNTER — TELEPHONE (OUTPATIENT)
Dept: PAIN MEDICINE | Facility: CLINIC | Age: 52
End: 2022-08-16
Payer: COMMERCIAL

## 2022-08-16 NOTE — TELEPHONE ENCOUNTER
Staff reached out to pt regarding Botox appointment on 08/17/22 with , pt appointment was not approved staff had to reschedule the appointment to 09/08/22 for 2:45pm .

## 2022-08-19 ENCOUNTER — OFFICE VISIT (OUTPATIENT)
Dept: SLEEP MEDICINE | Facility: CLINIC | Age: 52
End: 2022-08-19
Payer: COMMERCIAL

## 2022-08-19 VITALS
BODY MASS INDEX: 25.62 KG/M2 | DIASTOLIC BLOOD PRESSURE: 81 MMHG | SYSTOLIC BLOOD PRESSURE: 117 MMHG | WEIGHT: 158.75 LBS

## 2022-08-19 DIAGNOSIS — R06.83 SNORING: ICD-10-CM

## 2022-08-19 DIAGNOSIS — I10 ESSENTIAL HYPERTENSION: ICD-10-CM

## 2022-08-19 DIAGNOSIS — G47.30 SLEEP APNEA, UNSPECIFIED TYPE: Primary | ICD-10-CM

## 2022-08-19 DIAGNOSIS — R53.83 FATIGUE, UNSPECIFIED TYPE: ICD-10-CM

## 2022-08-19 DIAGNOSIS — G47.10 HYPERSOMNOLENCE: ICD-10-CM

## 2022-08-19 PROCEDURE — 3074F PR MOST RECENT SYSTOLIC BLOOD PRESSURE < 130 MM HG: ICD-10-PCS | Mod: CPTII,S$GLB,, | Performed by: INTERNAL MEDICINE

## 2022-08-19 PROCEDURE — 4010F PR ACE/ARB THEARPY RXD/TAKEN: ICD-10-PCS | Mod: CPTII,S$GLB,, | Performed by: INTERNAL MEDICINE

## 2022-08-19 PROCEDURE — 99204 PR OFFICE/OUTPT VISIT, NEW, LEVL IV, 45-59 MIN: ICD-10-PCS | Mod: S$GLB,,, | Performed by: INTERNAL MEDICINE

## 2022-08-19 PROCEDURE — 1159F MED LIST DOCD IN RCRD: CPT | Mod: CPTII,S$GLB,, | Performed by: INTERNAL MEDICINE

## 2022-08-19 PROCEDURE — 99999 PR PBB SHADOW E&M-EST. PATIENT-LVL IV: ICD-10-PCS | Mod: PBBFAC,,, | Performed by: INTERNAL MEDICINE

## 2022-08-19 PROCEDURE — 3079F DIAST BP 80-89 MM HG: CPT | Mod: CPTII,S$GLB,, | Performed by: INTERNAL MEDICINE

## 2022-08-19 PROCEDURE — 3079F PR MOST RECENT DIASTOLIC BLOOD PRESSURE 80-89 MM HG: ICD-10-PCS | Mod: CPTII,S$GLB,, | Performed by: INTERNAL MEDICINE

## 2022-08-19 PROCEDURE — 3008F BODY MASS INDEX DOCD: CPT | Mod: CPTII,S$GLB,, | Performed by: INTERNAL MEDICINE

## 2022-08-19 PROCEDURE — 99999 PR PBB SHADOW E&M-EST. PATIENT-LVL IV: CPT | Mod: PBBFAC,,, | Performed by: INTERNAL MEDICINE

## 2022-08-19 PROCEDURE — 99204 OFFICE O/P NEW MOD 45 MIN: CPT | Mod: S$GLB,,, | Performed by: INTERNAL MEDICINE

## 2022-08-19 PROCEDURE — 3008F PR BODY MASS INDEX (BMI) DOCUMENTED: ICD-10-PCS | Mod: CPTII,S$GLB,, | Performed by: INTERNAL MEDICINE

## 2022-08-19 PROCEDURE — 3044F PR MOST RECENT HEMOGLOBIN A1C LEVEL <7.0%: ICD-10-PCS | Mod: CPTII,S$GLB,, | Performed by: INTERNAL MEDICINE

## 2022-08-19 PROCEDURE — 3044F HG A1C LEVEL LT 7.0%: CPT | Mod: CPTII,S$GLB,, | Performed by: INTERNAL MEDICINE

## 2022-08-19 PROCEDURE — 3074F SYST BP LT 130 MM HG: CPT | Mod: CPTII,S$GLB,, | Performed by: INTERNAL MEDICINE

## 2022-08-19 PROCEDURE — 4010F ACE/ARB THERAPY RXD/TAKEN: CPT | Mod: CPTII,S$GLB,, | Performed by: INTERNAL MEDICINE

## 2022-08-19 PROCEDURE — 1159F PR MEDICATION LIST DOCUMENTED IN MEDICAL RECORD: ICD-10-PCS | Mod: CPTII,S$GLB,, | Performed by: INTERNAL MEDICINE

## 2022-08-19 NOTE — PROGRESS NOTES
Referred by Esme Costello MD     NEW PATIENT VISIT    Esme Louise  is a pleasant 52 y.o. female  with PMH significant for PTSD, HTN, RA, MIRYAM, vitD def, GERD,  who presents for evaluation of witnessed apneas after recent cholecystectomy, snoring, and fatigue.    SLEEP SCHEDULE   Environment    Bed Time 10:30p-11P   Sleep Latency Reads x 1-2 hours (not long)   Arousals 0-1   Nocturia once   Back to sleep    Wake time 8A-8:30A (10-11A)   Naps 1-2 naps per day   Work        Past Medical History:   Diagnosis Date    Anemia     Anxiety     Arthritis     Arthritis     Breast disorder     Depression     Endometriosis     General anesthetics causing adverse effect in therapeutic use     Headaches, cluster     Hypertension     Mental disorder     Occipital neuralgia     PONV (postoperative nausea and vomiting)     Premature ovarian failure     Thyroid disease      Patient Active Problem List   Diagnosis    Pain of cervical facet joint    Facet arthropathy, cervical    Occipital neuralgia    Endometriosis    Premature ovarian failure    Essential hypertension    Hypothyroidism    Migraine without status migrainosus, not intractable    Mixed hyperlipidemia    Rheumatoid arthritis    Osteopenia    GERD (gastroesophageal reflux disease)    PTSD (post-traumatic stress disorder)    Depression    Fatigue    Gastritis without bleeding    Colon polyp    Overweight with body mass index (BMI) 25.0-29.9    Vitamin D deficiency    Mixed anxiety and depressive disorder    Decreased range of motion of neck    Neck muscle weakness    Poor posture    Weakness of both upper extremities    Calculus of gallbladder without cholecystitis without obstruction    Hepatic steatosis    MIRYAM (iron deficiency anemia)       Current Outpatient Medications:     abatacept/maltose (ORENCIA, WITH MALTOSE, IV), Inject into the vein., Disp: , Rfl:     butalbital-acetaminophen-caff -40 mg Cap, TK ONE C  PO Q 4 HOURS, Disp: , Rfl: 0    clonazePAM (KLONOPIN) 1 MG tablet, , Disp: , Rfl:     cyclobenzaprine (AMRIX) 15 MG 24 hr capsule, TAKE 1 CAPSULE BY MOUTH DAILY AT 6 IN THE EVENING compounded especially for you by the pharmacist, Disp: , Rfl: 3    diclofenac (VOLTAREN) 50 MG EC tablet, TK 1 T PO BID, Disp: , Rfl: 0    dicyclomine (BENTYL) 20 mg tablet, Take 20 mg by mouth daily as needed., Disp: , Rfl:     ergocalciferol (ERGOCALCIFEROL) 50,000 unit Cap, Take 1 capsule (50,000 Units total) by mouth once a week., Disp: 12 capsule, Rfl: 3    estradioL (ESTRACE) 1 MG tablet, Take 1 tablet (1 mg total) by mouth once daily., Disp: 90 tablet, Rfl: 1    fenofibrate 160 MG Tab, Take 1 tablet (160 mg total) by mouth once daily., Disp: 90 tablet, Rfl: 3    ferrous sulfate 325 (65 FE) MG EC tablet, Take 1 tablet (325 mg total) by mouth once daily., Disp: 90 tablet, Rfl: 3    levomefolate/B12/herb 236 (RHEUMATE ORAL), Take 1 tablet by mouth Daily. stopped, Disp: , Rfl:     levothyroxine (SYNTHROID) 100 MCG tablet, Take 1 tablet (100 mcg total) by mouth once daily., Disp: 90 tablet, Rfl: 3    losartan (COZAAR) 50 MG tablet, Take 1 tablet (50 mg total) by mouth once daily., Disp: 90 tablet, Rfl: 3    methotrexate 2.5 MG Tab, Take by mouth every 7 days., Disp: , Rfl:     NUVIGIL 250 mg tablet, TK 1 T PO QD, Disp: , Rfl: 5    omega-3 fatty acids/fish oil (FISH OIL-OMEGA-3 FATTY ACIDS) 300-1,000 mg capsule, Take by mouth once daily., Disp: , Rfl:     onabotulinumtoxinA (BOTOX INJ), Inject as directed., Disp: , Rfl:     pantoprazole (PROTONIX) 20 MG tablet, Take 1 tablet (20 mg total) by mouth once daily., Disp: 90 tablet, Rfl: 3    pravastatin (PRAVACHOL) 40 MG tablet, Take 1 tablet (40 mg total) by mouth once daily., Disp: 90 tablet, Rfl: 0    progesterone (PROMETRIUM) 100 MG capsule, Take 1 capsule (100 mg total) by mouth once daily., Disp: 90 capsule, Rfl: 1    propranolol (INDERAL) 10 MG tablet, , Disp: , Rfl:      sodium chloride 0.9% SolP 100 mL with abatacept (with maltose) 250 mg SolR, abatacept  750MG 1X MONTH, Disp: , Rfl:     tizanidine 2 mg Cap, Take 2 mg by mouth as needed., Disp: , Rfl:     VIIBRYD 10 mg Tab tablet, Take 1 tablet (10 mg total) by mouth once daily. (Patient taking differently: Take 15 mg by mouth once daily.), Disp: 7 tablet, Rfl: 0    zolmitriptan (ZOMIG) 5 MG tablet, TK 1 T PO  ONCE PRN COLLAZO, Disp: , Rfl: 3     Vitals:    08/19/22 1606   BP: 117/81   BP Location: Right arm   Patient Position: Sitting   BP Method: Large (Automatic)   Weight: 72 kg (158 lb 11.7 oz)     Physical Exam:    GEN:   Well-appearing  Psych:  Appropriate affect, demonstrates insight  SKIN:  No rash on the face or bridge of the nose  HEENT: MP3,      LABS:   Lab Results   Component Value Date    HGB 11.3 (L) 05/20/2022    CO2 24 05/20/2022       RECORDS REVIEWED PREVIOUSLY:    No prior sleep testing.    ASSESSMENT      PROBLEM DESCRIPTION/ Sx on Presentation  STATUS   suspected LYSSA   + snoring, + witnessed apneas after recent surgery  Her sister snores, grandmother snores    New   Daytime Sx   + sleepiness when inactive (attributes to RA, depression, chronic fatigue)  denies sleepiness when driving   nuvigil 250 started 2016  ESS 12/24 on intake when not taking nuvigil (but not sleepy when taking nuvigil)  New   Nocturia   x once per sleep period  New   AM headache   Chronic migraines and cervical dystonia  Cyclobenzaprine ER at night,   New   Other issues:     PLAN     -recommend sleep testing   -discussed trial therapy if LYSSA present and the patient is  open to a trial of CPAP therapy  -driving precautions were discussed with the patient    RTC          The patient was given open opportunity to ask questions and/or express concerns about treatment plan.   All questions/concerns were discussed.     Two patient identifiers used prior to evaluation.

## 2022-08-20 ENCOUNTER — PATIENT MESSAGE (OUTPATIENT)
Dept: OBSTETRICS AND GYNECOLOGY | Facility: CLINIC | Age: 52
End: 2022-08-20
Payer: COMMERCIAL

## 2022-08-20 DIAGNOSIS — D21.9 FIBROIDS: Primary | ICD-10-CM

## 2022-08-31 ENCOUNTER — TELEPHONE (OUTPATIENT)
Dept: SLEEP MEDICINE | Facility: OTHER | Age: 52
End: 2022-08-31
Payer: COMMERCIAL

## 2022-09-13 ENCOUNTER — TELEPHONE (OUTPATIENT)
Dept: SLEEP MEDICINE | Facility: OTHER | Age: 52
End: 2022-09-13
Payer: COMMERCIAL

## 2022-09-20 ENCOUNTER — TELEPHONE (OUTPATIENT)
Dept: SLEEP MEDICINE | Facility: OTHER | Age: 52
End: 2022-09-20
Payer: COMMERCIAL

## 2022-09-28 ENCOUNTER — PATIENT MESSAGE (OUTPATIENT)
Dept: ADMINISTRATIVE | Facility: OTHER | Age: 52
End: 2022-09-28
Payer: COMMERCIAL

## 2022-09-29 ENCOUNTER — TELEPHONE (OUTPATIENT)
Dept: SLEEP MEDICINE | Facility: OTHER | Age: 52
End: 2022-09-29
Payer: COMMERCIAL

## 2022-09-30 ENCOUNTER — HOSPITAL ENCOUNTER (OUTPATIENT)
Dept: SLEEP MEDICINE | Facility: OTHER | Age: 52
Discharge: HOME | End: 2022-09-30
Attending: INTERNAL MEDICINE
Payer: COMMERCIAL

## 2022-09-30 DIAGNOSIS — I10 ESSENTIAL HYPERTENSION: ICD-10-CM

## 2022-09-30 DIAGNOSIS — R06.83 SNORING: ICD-10-CM

## 2022-09-30 DIAGNOSIS — G47.30 SLEEP APNEA, UNSPECIFIED TYPE: ICD-10-CM

## 2022-09-30 DIAGNOSIS — R53.83 FATIGUE, UNSPECIFIED TYPE: ICD-10-CM

## 2022-09-30 DIAGNOSIS — G47.10 HYPERSOMNOLENCE: ICD-10-CM

## 2022-09-30 PROCEDURE — 95800 SLP STDY UNATTENDED: CPT

## 2022-09-30 PROCEDURE — 95806 PR SLEEP STUDY, UNATTENDED, SIMUL RECORD HR/O2 SAT/RESP FLOW/RESP EFFT: ICD-10-PCS | Mod: 26,,, | Performed by: INTERNAL MEDICINE

## 2022-09-30 PROCEDURE — 95806 SLEEP STUDY UNATT&RESP EFFT: CPT | Mod: 26,,, | Performed by: INTERNAL MEDICINE

## 2022-10-04 ENCOUNTER — PATIENT MESSAGE (OUTPATIENT)
Dept: INTERNAL MEDICINE | Facility: CLINIC | Age: 52
End: 2022-10-04
Payer: COMMERCIAL

## 2022-10-04 DIAGNOSIS — U07.1 COVID-19 VIRUS INFECTION: Primary | ICD-10-CM

## 2022-10-04 NOTE — TELEPHONE ENCOUNTER
Please tell her to hold the Losartan for now and to monitor her blood pressure.  Please schedule virtual visit for COVID, low BP.  Thank you!  (Can you cancel the E Visit request?)    Please discuss ER return prompts:  If you develop worsening or persistent symptoms such as chest pain, difficulty breathing, lightheadedness, dizziness, palpitations, please come to the ER for further evaluation.    If her symptoms aren't bad, we can hold off on Paxlovid since it would require her to hold some of her other medications.    I also ordered the COVID home monitoring system.  It will send the patient a text message daily to check on symptoms and escalate care if needed.

## 2022-10-06 ENCOUNTER — PATIENT MESSAGE (OUTPATIENT)
Dept: SLEEP MEDICINE | Facility: CLINIC | Age: 52
End: 2022-10-06
Payer: COMMERCIAL

## 2022-10-10 ENCOUNTER — NURSE TRIAGE (OUTPATIENT)
Dept: ADMINISTRATIVE | Facility: CLINIC | Age: 52
End: 2022-10-10
Payer: COMMERCIAL

## 2022-10-10 ENCOUNTER — HOSPITAL ENCOUNTER (EMERGENCY)
Facility: OTHER | Age: 52
Discharge: HOME | End: 2022-10-10
Attending: EMERGENCY MEDICINE
Payer: COMMERCIAL

## 2022-10-10 VITALS
SYSTOLIC BLOOD PRESSURE: 147 MMHG | HEIGHT: 66 IN | WEIGHT: 165 LBS | HEART RATE: 68 BPM | TEMPERATURE: 98 F | BODY MASS INDEX: 26.52 KG/M2 | DIASTOLIC BLOOD PRESSURE: 83 MMHG | RESPIRATION RATE: 17 BRPM | OXYGEN SATURATION: 98 %

## 2022-10-10 DIAGNOSIS — G47.33 OSA (OBSTRUCTIVE SLEEP APNEA): Primary | ICD-10-CM

## 2022-10-10 DIAGNOSIS — R06.02 SOB (SHORTNESS OF BREATH): ICD-10-CM

## 2022-10-10 LAB
ANION GAP SERPL CALC-SCNC: 9 MMOL/L (ref 8–16)
BASOPHILS # BLD AUTO: 0.03 K/UL (ref 0–0.2)
BASOPHILS NFR BLD: 0.5 % (ref 0–1.9)
BUN SERPL-MCNC: 10 MG/DL (ref 6–20)
CALCIUM SERPL-MCNC: 9.3 MG/DL (ref 8.7–10.5)
CHLORIDE SERPL-SCNC: 108 MMOL/L (ref 95–110)
CO2 SERPL-SCNC: 24 MMOL/L (ref 23–29)
CREAT SERPL-MCNC: 0.8 MG/DL (ref 0.5–1.4)
CTP QC/QA: YES
DIFFERENTIAL METHOD: ABNORMAL
EOSINOPHIL # BLD AUTO: 0 K/UL (ref 0–0.5)
EOSINOPHIL NFR BLD: 0.7 % (ref 0–8)
ERYTHROCYTE [DISTWIDTH] IN BLOOD BY AUTOMATED COUNT: 18.1 % (ref 11.5–14.5)
EST. GFR  (NO RACE VARIABLE): >60 ML/MIN/1.73 M^2
GLUCOSE SERPL-MCNC: 99 MG/DL (ref 70–110)
HCT VFR BLD AUTO: 34.9 % (ref 37–48.5)
HGB BLD-MCNC: 11.5 G/DL (ref 12–16)
IMM GRANULOCYTES # BLD AUTO: 0.02 K/UL (ref 0–0.04)
IMM GRANULOCYTES NFR BLD AUTO: 0.4 % (ref 0–0.5)
LYMPHOCYTES # BLD AUTO: 1.7 K/UL (ref 1–4.8)
LYMPHOCYTES NFR BLD: 29.9 % (ref 18–48)
MCH RBC QN AUTO: 28.3 PG (ref 27–31)
MCHC RBC AUTO-ENTMCNC: 33 G/DL (ref 32–36)
MCV RBC AUTO: 86 FL (ref 82–98)
MONOCYTES # BLD AUTO: 0.5 K/UL (ref 0.3–1)
MONOCYTES NFR BLD: 9.2 % (ref 4–15)
NEUTROPHILS # BLD AUTO: 3.3 K/UL (ref 1.8–7.7)
NEUTROPHILS NFR BLD: 59.3 % (ref 38–73)
NRBC BLD-RTO: 0 /100 WBC
PLATELET # BLD AUTO: 380 K/UL (ref 150–450)
PMV BLD AUTO: 9.5 FL (ref 9.2–12.9)
POTASSIUM SERPL-SCNC: 4.5 MMOL/L (ref 3.5–5.1)
RBC # BLD AUTO: 4.06 M/UL (ref 4–5.4)
SARS-COV-2 RDRP RESP QL NAA+PROBE: POSITIVE
SODIUM SERPL-SCNC: 141 MMOL/L (ref 136–145)
WBC # BLD AUTO: 5.55 K/UL (ref 3.9–12.7)

## 2022-10-10 PROCEDURE — 25000003 PHARM REV CODE 250

## 2022-10-10 PROCEDURE — 96360 HYDRATION IV INFUSION INIT: CPT

## 2022-10-10 PROCEDURE — 85025 COMPLETE CBC W/AUTO DIFF WBC: CPT

## 2022-10-10 PROCEDURE — 80048 BASIC METABOLIC PNL TOTAL CA: CPT

## 2022-10-10 PROCEDURE — 87635 SARS-COV-2 COVID-19 AMP PRB: CPT | Performed by: EMERGENCY MEDICINE

## 2022-10-10 PROCEDURE — 99284 EMERGENCY DEPT VISIT MOD MDM: CPT | Mod: 25

## 2022-10-10 RX ORDER — PROMETHAZINE HYDROCHLORIDE AND DEXTROMETHORPHAN HYDROBROMIDE 6.25; 15 MG/5ML; MG/5ML
5 SYRUP ORAL EVERY 4 HOURS PRN
Qty: 118 ML | Refills: 0 | Status: SHIPPED | OUTPATIENT
Start: 2022-10-10 | End: 2022-10-20

## 2022-10-10 RX ORDER — ALBUTEROL SULFATE 90 UG/1
1-2 AEROSOL, METERED RESPIRATORY (INHALATION) EVERY 6 HOURS PRN
Qty: 6.7 G | Refills: 0 | Status: SHIPPED | OUTPATIENT
Start: 2022-10-10 | End: 2022-11-22

## 2022-10-10 RX ADMIN — SODIUM CHLORIDE 1000 ML: 0.9 INJECTION, SOLUTION INTRAVENOUS at 03:10

## 2022-10-10 NOTE — ED PROVIDER NOTES
"CHIEF COMPLAINT:   Chief Complaint   Patient presents with    Fatigue     Pt reports she was dx with Covid-19 and has increased body aches and "hearing a rattle in her lungs" Pt reports increased SOB. Denies CP. Dayquil and nyquil taken for symptoms. Denies fevers.        HISTORY OF PRESENT ILLNESS: Esme Louise is a 52 y.o. female with rheumatoid arthritis who presents to the emergency department today with complaint of increased shortness of breath with COVID. Patient states she had a positive home COVID test 8 days ago and felt like the virus was running its course, but reports an increase in shortness of breath and fatigue in the last couple of days. She has been monitoring her blood oxygen saturation at home and states that she has been between 92% and 94%, which is lower than what it had been earlier in the illness.  Patient reports that moving around and walking around worsens her symptoms.  Additionally, she complaints of increased fatigue and weakness.  She states that sometimes when she stands up from seated position she needs to lay back down and feels like her blood pressure is dropping. Of note, patient reports last being on methotrexate a few weeks ago.  She has been using over-the-counter cold medication and acetaminophen with mild alleviation of symptoms.  Denies current fever, chills, N/V/D, chest pain.      REVIEW OF SYSTEMS:  Constitutional:  No fever, no chills.  Eyes: No discharge. No pain.  HENT: no nasal congestion, no sore throat.   Cardiovascular: No chest pain, no palpitations.  Respiratory:  + cough, + shortness of breath.  Gastrointestinal: no nausea, no diarrhea, No abdominal pain, no vomiting.   Genitourinary: No hematuria, dysuria, urgency.  Musculoskeletal:  No back pain, no body aches.  Skin: No rashes, no lesions.  Neurological:  No headache, no focal weakness.    Otherwise remaining ROS negative     ALLERGIES REVIEWED  MEDICATIONS REVIEWED  PMH/PSH/SOC/FH REVIEWED     The " history is provided by the patient.    Nursing/Ancillary staff note reviewed.      PHYSICAL EXAM:  VS reviewed  Vitals:    10/10/22 1616   BP: (!) 147/83   Pulse: 68   Resp: 17   Temp: 98.3 °F (36.8 °C)       General Appearance: The patient is alert, has no immediate or signs of toxicity. No acute distress.    HEENT: Eyes:  With no injection, No drainage.  Dry mucous membranes  Neck: Neck is without stridor.   Respiratory:  Lungs clear to auscultation bilaterally.  No wheezing, rales, or rhonchi. There are no retractions.  No respiratory distress.  Cardiovascular: Regular rate and rhythm.  Gastrointestinal:  Abdomen is without distention.   Neurological: Alert and oriented x 4. No focal weakness.  Skin: Warm and dry, no rashes.  Musculoskeletal: Extremities are non-swollen and have full range of motion.      Past Medical History:   Diagnosis Date    Anemia     Anxiety     Arthritis     Arthritis     Breast disorder     Depression     Endometriosis     General anesthetics causing adverse effect in therapeutic use     Headaches, cluster     Hypertension     Mental disorder     Occipital neuralgia     PONV (postoperative nausea and vomiting)     Premature ovarian failure     Thyroid disease          Past Surgical History:   Procedure Laterality Date    BREAST LUMPECTOMY Right     benign     CHOLECYSTECTOMY  July 13, 2022    COLON SURGERY      surgical CLIP- POST-POLYP    ENDOMETRIAL ABLATION      endometriosis     LAPAROSCOPIC CHOLECYSTECTOMY N/A 07/13/2022    Procedure: CHOLECYSTECTOMY, LAPAROSCOPIC;  Surgeon: Lui Hernandez Jr., MD;  Location: [facility];  Service: General;  Laterality: N/A;    OCCIPITAL NERVE STIMULATOR INSERTION  2015    OOPHORECTOMY Right     d/t endometriosis        Results for orders placed or performed during the hospital encounter of 10/10/22   CBC auto differential   Result Value Ref Range    WBC 5.55 3.90 - 12.70 K/uL    RBC 4.06 4.00 - 5.40 M/uL    Hemoglobin 11.5 (L) 12.0 - 16.0 g/dL     Hematocrit 34.9 (L) 37.0 - 48.5 %    MCV 86 82 - 98 fL    MCH 28.3 27.0 - 31.0 pg    MCHC 33.0 32.0 - 36.0 g/dL    RDW 18.1 (H) 11.5 - 14.5 %    Platelets 380 150 - 450 K/uL    MPV 9.5 9.2 - 12.9 fL    Immature Granulocytes 0.4 0.0 - 0.5 %    Gran # (ANC) 3.3 1.8 - 7.7 K/uL    Immature Grans (Abs) 0.02 0.00 - 0.04 K/uL    Lymph # 1.7 1.0 - 4.8 K/uL    Mono # 0.5 0.3 - 1.0 K/uL    Eos # 0.0 0.0 - 0.5 K/uL    Baso # 0.03 0.00 - 0.20 K/uL    nRBC 0 0 /100 WBC    Gran % 59.3 38.0 - 73.0 %    Lymph % 29.9 18.0 - 48.0 %    Mono % 9.2 4.0 - 15.0 %    Eosinophil % 0.7 0.0 - 8.0 %    Basophil % 0.5 0.0 - 1.9 %    Differential Method Automated    Basic metabolic panel   Result Value Ref Range    Sodium 141 136 - 145 mmol/L    Potassium 4.5 3.5 - 5.1 mmol/L    Chloride 108 95 - 110 mmol/L    CO2 24 23 - 29 mmol/L    Glucose 99 70 - 110 mg/dL    BUN 10 6 - 20 mg/dL    Creatinine 0.8 0.5 - 1.4 mg/dL    Calcium 9.3 8.7 - 10.5 mg/dL    Anion Gap 9 8 - 16 mmol/L    eGFR >60 >60 mL/min/1.73 m^2   POCT COVID-19 Rapid Screening   Result Value Ref Range    POC Rapid COVID Positive (A) Negative     Acceptable Yes          Imaging Results              X-Ray Chest PA And Lateral (Final result)  Result time 10/10/22 14:36:20      Final result by Vincent Llamas MD (10/10/22 14:36:20)                   Impression:      No acute abnormality.      Electronically signed by: Vincent Llamas MD  Date:    10/10/2022  Time:    14:36               Narrative:    EXAMINATION:  XR CHEST PA AND LATERAL    CLINICAL HISTORY:  Shortness of breath    TECHNIQUE:  PA and lateral views of the chest were performed.    COMPARISON:  None    FINDINGS:  The lungs are clear, with normal appearance of pulmonary vasculature and no pleural effusion or pneumothorax.    The cardiac silhouette is normal in size. The hilar and mediastinal contours are unremarkable.    Bones are intact.                                       MDM  Initial  impression  Urgent evaluation of a 52 y.o. female with shortness of breath secondary to COVID infection. Patient presenting with general illness symptoms; appears well and nontoxic. Exam grossly unremarkable at this time.  Patient with positive home COVID test 8 days ago.  Given patient's autoimmune disorder, will obtain chest x-ray to rule out COVID associated pneumonia.  Will also obtain labs and administer appropriate supportive measures.    Differential diagnosis includes but is not limited to:  Bacterial/viral pharyngitis, bacterial/viral pneumonia, COVID-19, influenza, viral syndrome, sepsis, meningitis, otitis media/external, nasal polyp, bacterial sinusitis, allergic rhinitis.    ED management  Repeat COVID swab here still positive.  ED Course as of 10/10/22 1744   Mon Oct 10, 2022   1506 Chest x-ray without any evidence of consolidation or pleural effusion.  No acute cardiopulmonary process. [BERTA]   1506 Nursing ambulated patient and she maintained blood oxygen of 97% to 96% on room air [BERTA]   1556 Physician Attestation Statement: I have reviewed this case with my non-physician provider. The patient's condition warranted physician involvement. The treatment regimen was reviewed by me.     Management decisions for this encounter made during COVID-19 public health emergency. Available resources, standards for appropriate emergency department evaluation, and admission vs. discharge standards have necessarily shifted and remain dynamic.   Pt is a 52 y.o. F with rheumatoid arthritis, off immunosuppresants for several weeks who presents with worsening shortness of breath 8 days after onset of acute COVID19 infection symptoms.   Ddx included PNA, COVID19 penumonitis. I doubt acute PE.   I independently reviewed and interpreted CXR which shows no pneumothorax, no focal consolidation, no cardiomegaly, no acute process.   [RC]   1600 Patient remains afebrile and nontoxic. She is satting appropriately on room air and  reports moderate alleviation of symptoms with fluid rehydration.  Plan to discharge home in stable condition with cough medicine to take at night to help her sleep and albuterol inhaler to use as needed.  After taking into careful account the patient's history, physical exam findings, as well as empirical and objective data obtained throughout ED workup, I feel no emergent medical condition has been identified. No further evaluation or admission was felt to be required, and the patient is stable for discharge from the ED. The patient was updated with test results, overall clinical impression, and recommended further plan of care, including discharge instructions as provided and outpatient follow-up for continued evaluation and management as needed. All questions were answered. The patient expressed understanding and agreed with current plan for discharge and follow-up plan of care. Strict ED return precautions were provided, including return/worsening of current symptoms, new symptoms, or any other concerns.   [BERTA]      ED Course User Index  [BERTA] Keyonna Mi PA-C  [RC] Duy Rivera MD       Medications   sodium chloride 0.9% bolus 1,000 mL (0 mLs Intravenous Stopped 10/10/22 9289)         Impression  Final diagnoses:  [R06.02] SOB (shortness of breath)      Follow-up Information       Esme Costello MD.    Specialty: Internal Medicine  Why: For re-evaluation  Contact information:  [address]  [address]  [facility] [id number] [phone]               [facility] - Emergency Dept.    Specialty: Emergency Medicine  Why: If symptoms worsen  Contact information:  [address]  [facility] [address] [phone]                            This note was created using [employer].  This program may occasionally misinterpret certain words and phrases.     Keyonna Mi PA-C  10/10/22 4474

## 2022-10-10 NOTE — Clinical Note
"Esme"Anam Louise was seen and treated in our emergency department on 10/10/2022.     COVID-19 is present in our communities across the state. There is limited testing for COVID at this time, so not all patients can be tested. In this situation, your employee meets the following criteria:    Esme Louise has met the criteria for COVID-19 testing and has a POSITIVE result. She can return to work once they are asymptomatic for 24 hours without the use of fever reducing medications AND at least five days from the first positive result. A mask is recommended for 5 days post quarantine.     If you have any questions or concerns, or if I can be of further assistance, please do not hesitate to contact me.    Sincerely,             Keyonna Mi PA-C"

## 2022-10-10 NOTE — TELEPHONE ENCOUNTER
Pt responded to [facility] text message. Pt reports testing positive 10/4. Sates symptoms were manageable last week but are now changing states. Reports rattling in lungs. And states pulse ox reading noted to be 92-94% at times. States currently 95%, also reports weakness. Pt advised to be seen in ED now, Pt verbalized understanding.  Reason for Disposition   Patient sounds very sick or weak to the triager    Additional Information   Negative: SEVERE difficulty breathing (e.g., struggling for each breath, speaks in single words)   Negative: Difficult to awaken or acting confused (e.g., disoriented, slurred speech)   Negative: Bluish (or gray) lips or face now   Negative: Shock suspected (e.g., cold/pale/clammy skin, too weak to stand, low BP, rapid pulse)   Negative: Sounds like a life-threatening emergency to the triager   Negative: SEVERE or constant chest pain or pressure  (Exception: Mild central chest pain, present only when coughing.)   Negative: MODERATE difficulty breathing (e.g., speaks in phrases, SOB even at rest, pulse 100-120)   Negative: Headache and stiff neck (can't touch chin to chest)   Negative: Oxygen level (e.g., pulse oximetry) 90 percent or lower   Negative: Chest pain or pressure  (Exception: MILD central chest pain, present only when coughing)    Protocols used: Coronavirus (COVID-19) Diagnosed or [place]

## 2022-10-17 ENCOUNTER — LAB VISIT (OUTPATIENT)
Dept: LAB | Facility: OTHER | Age: 52
End: 2022-10-17
Attending: INTERNAL MEDICINE
Payer: COMMERCIAL

## 2022-10-17 DIAGNOSIS — M85.80 OSTEOPENIA, UNSPECIFIED LOCATION: ICD-10-CM

## 2022-10-17 DIAGNOSIS — D75.839 THROMBOCYTOSIS: ICD-10-CM

## 2022-10-17 DIAGNOSIS — Z00.00 ANNUAL PHYSICAL EXAM: ICD-10-CM

## 2022-10-17 DIAGNOSIS — D50.9 IRON DEFICIENCY ANEMIA, UNSPECIFIED IRON DEFICIENCY ANEMIA TYPE: ICD-10-CM

## 2022-10-17 LAB
25(OH)D3+25(OH)D2 SERPL-MCNC: 25 NG/ML (ref 30–96)
BASOPHILS # BLD AUTO: 0.05 K/UL (ref 0–0.2)
BASOPHILS NFR BLD: 0.7 % (ref 0–1.9)
DIFFERENTIAL METHOD: ABNORMAL
EOSINOPHIL # BLD AUTO: 0.2 K/UL (ref 0–0.5)
EOSINOPHIL NFR BLD: 3 % (ref 0–8)
ERYTHROCYTE [DISTWIDTH] IN BLOOD BY AUTOMATED COUNT: 17.2 % (ref 11.5–14.5)
FERRITIN SERPL-MCNC: 33 NG/ML (ref 20–300)
HCT VFR BLD AUTO: 35.7 % (ref 37–48.5)
HGB BLD-MCNC: 11.8 G/DL (ref 12–16)
IMM GRANULOCYTES # BLD AUTO: 0.14 K/UL (ref 0–0.04)
IMM GRANULOCYTES NFR BLD AUTO: 2 % (ref 0–0.5)
IRON SERPL-MCNC: 64 UG/DL (ref 30–160)
LYMPHOCYTES # BLD AUTO: 2.4 K/UL (ref 1–4.8)
LYMPHOCYTES NFR BLD: 35 % (ref 18–48)
MCH RBC QN AUTO: 28.5 PG (ref 27–31)
MCHC RBC AUTO-ENTMCNC: 33.1 G/DL (ref 32–36)
MCV RBC AUTO: 86 FL (ref 82–98)
MONOCYTES # BLD AUTO: 0.7 K/UL (ref 0.3–1)
MONOCYTES NFR BLD: 10.6 % (ref 4–15)
NEUTROPHILS # BLD AUTO: 3.4 K/UL (ref 1.8–7.7)
NEUTROPHILS NFR BLD: 48.7 % (ref 38–73)
NRBC BLD-RTO: 0 /100 WBC
PLATELET # BLD AUTO: 551 K/UL (ref 150–450)
PMV BLD AUTO: 9.7 FL (ref 9.2–12.9)
RBC # BLD AUTO: 4.14 M/UL (ref 4–5.4)
SATURATED IRON: 12 % (ref 20–50)
TOTAL IRON BINDING CAPACITY: 521 UG/DL (ref 250–450)
TRANSFERRIN SERPL-MCNC: 352 MG/DL (ref 200–375)
WBC # BLD AUTO: 6.98 K/UL (ref 3.9–12.7)

## 2022-10-17 PROCEDURE — 86364 TISS TRNSGLTMNASE EA IG CLAS: CPT | Mod: 59 | Performed by: INTERNAL MEDICINE

## 2022-10-17 PROCEDURE — 85025 COMPLETE CBC W/AUTO DIFF WBC: CPT | Performed by: INTERNAL MEDICINE

## 2022-10-17 PROCEDURE — 84466 ASSAY OF TRANSFERRIN: CPT | Performed by: INTERNAL MEDICINE

## 2022-10-17 PROCEDURE — 82306 VITAMIN D 25 HYDROXY: CPT | Performed by: INTERNAL MEDICINE

## 2022-10-17 PROCEDURE — 82728 ASSAY OF FERRITIN: CPT | Performed by: INTERNAL MEDICINE

## 2022-10-17 PROCEDURE — 36415 COLL VENOUS BLD VENIPUNCTURE: CPT | Performed by: INTERNAL MEDICINE

## 2022-10-17 PROCEDURE — 83516 IMMUNOASSAY NONANTIBODY: CPT | Performed by: INTERNAL MEDICINE

## 2022-10-20 LAB
TTG IGA SER-ACNC: 6 UNITS
TTG IGG SER-ACNC: 4 UNITS

## 2022-10-31 ENCOUNTER — TELEPHONE (OUTPATIENT)
Dept: OBSTETRICS AND GYNECOLOGY | Facility: CLINIC | Age: 52
End: 2022-10-31
Payer: COMMERCIAL

## 2022-10-31 NOTE — TELEPHONE ENCOUNTER
I called pt. Discussed 9 cm fibroid. Some urinary urgency. Otherwise not pain symptoms. For now she will just leave it and not do surgery. All questions answered.

## 2022-11-11 ENCOUNTER — PATIENT MESSAGE (OUTPATIENT)
Dept: RESEARCH | Facility: HOSPITAL | Age: 52
End: 2022-11-11
Payer: COMMERCIAL

## 2022-11-22 ENCOUNTER — OFFICE VISIT (OUTPATIENT)
Dept: INTERNAL MEDICINE | Facility: CLINIC | Age: 52
End: 2022-11-22
Payer: COMMERCIAL

## 2022-11-22 VITALS
BODY MASS INDEX: 25.22 KG/M2 | HEART RATE: 80 BPM | WEIGHT: 156.94 LBS | OXYGEN SATURATION: 98 % | SYSTOLIC BLOOD PRESSURE: 135 MMHG | DIASTOLIC BLOOD PRESSURE: 72 MMHG | HEIGHT: 66 IN

## 2022-11-22 DIAGNOSIS — E03.9 HYPOTHYROIDISM, UNSPECIFIED TYPE: ICD-10-CM

## 2022-11-22 DIAGNOSIS — D75.839 THROMBOCYTOSIS: ICD-10-CM

## 2022-11-22 DIAGNOSIS — Z00.00 ANNUAL PHYSICAL EXAM: ICD-10-CM

## 2022-11-22 DIAGNOSIS — I10 ESSENTIAL HYPERTENSION: Primary | ICD-10-CM

## 2022-11-22 DIAGNOSIS — Z23 HIGH PRIORITY FOR COVID-19 VACCINATION: ICD-10-CM

## 2022-11-22 DIAGNOSIS — M85.80 OSTEOPENIA, UNSPECIFIED LOCATION: ICD-10-CM

## 2022-11-22 DIAGNOSIS — D50.9 IRON DEFICIENCY ANEMIA, UNSPECIFIED IRON DEFICIENCY ANEMIA TYPE: ICD-10-CM

## 2022-11-22 DIAGNOSIS — Z23 INFLUENZA VACCINE NEEDED: ICD-10-CM

## 2022-11-22 DIAGNOSIS — E78.2 MIXED HYPERLIPIDEMIA: ICD-10-CM

## 2022-11-22 DIAGNOSIS — E66.3 OVERWEIGHT WITH BODY MASS INDEX (BMI) 25.0-29.9: ICD-10-CM

## 2022-11-22 PROBLEM — G47.33 OSA (OBSTRUCTIVE SLEEP APNEA): Status: ACTIVE | Noted: 2022-11-22

## 2022-11-22 PROBLEM — K80.20 CALCULUS OF GALLBLADDER WITHOUT CHOLECYSTITIS WITHOUT OBSTRUCTION: Status: RESOLVED | Noted: 2022-07-13 | Resolved: 2022-11-22

## 2022-11-22 PROCEDURE — 1160F RVW MEDS BY RX/DR IN RCRD: CPT | Mod: CPTII,S$GLB,, | Performed by: INTERNAL MEDICINE

## 2022-11-22 PROCEDURE — 3008F BODY MASS INDEX DOCD: CPT | Mod: CPTII,S$GLB,, | Performed by: INTERNAL MEDICINE

## 2022-11-22 PROCEDURE — 3078F PR MOST RECENT DIASTOLIC BLOOD PRESSURE < 80 MM HG: ICD-10-PCS | Mod: CPTII,S$GLB,, | Performed by: INTERNAL MEDICINE

## 2022-11-22 PROCEDURE — 1160F PR REVIEW ALL MEDS BY PRESCRIBER/CLIN PHARMACIST DOCUMENTED: ICD-10-PCS | Mod: CPTII,S$GLB,, | Performed by: INTERNAL MEDICINE

## 2022-11-22 PROCEDURE — 99215 OFFICE O/P EST HI 40 MIN: CPT | Mod: S$GLB,,, | Performed by: INTERNAL MEDICINE

## 2022-11-22 PROCEDURE — 4010F PR ACE/ARB THEARPY RXD/TAKEN: ICD-10-PCS | Mod: CPTII,S$GLB,, | Performed by: INTERNAL MEDICINE

## 2022-11-22 PROCEDURE — 1159F MED LIST DOCD IN RCRD: CPT | Mod: CPTII,S$GLB,, | Performed by: INTERNAL MEDICINE

## 2022-11-22 PROCEDURE — 3008F PR BODY MASS INDEX (BMI) DOCUMENTED: ICD-10-PCS | Mod: CPTII,S$GLB,, | Performed by: INTERNAL MEDICINE

## 2022-11-22 PROCEDURE — 99999 PR PBB SHADOW E&M-EST. PATIENT-LVL V: ICD-10-PCS | Mod: PBBFAC,,, | Performed by: INTERNAL MEDICINE

## 2022-11-22 PROCEDURE — 3075F PR MOST RECENT SYSTOLIC BLOOD PRESS GE 130-139MM HG: ICD-10-PCS | Mod: CPTII,S$GLB,, | Performed by: INTERNAL MEDICINE

## 2022-11-22 PROCEDURE — 4010F ACE/ARB THERAPY RXD/TAKEN: CPT | Mod: CPTII,S$GLB,, | Performed by: INTERNAL MEDICINE

## 2022-11-22 PROCEDURE — 3044F PR MOST RECENT HEMOGLOBIN A1C LEVEL <7.0%: ICD-10-PCS | Mod: CPTII,S$GLB,, | Performed by: INTERNAL MEDICINE

## 2022-11-22 PROCEDURE — 99999 PR PBB SHADOW E&M-EST. PATIENT-LVL V: CPT | Mod: PBBFAC,,, | Performed by: INTERNAL MEDICINE

## 2022-11-22 PROCEDURE — 3078F DIAST BP <80 MM HG: CPT | Mod: CPTII,S$GLB,, | Performed by: INTERNAL MEDICINE

## 2022-11-22 PROCEDURE — 3075F SYST BP GE 130 - 139MM HG: CPT | Mod: CPTII,S$GLB,, | Performed by: INTERNAL MEDICINE

## 2022-11-22 PROCEDURE — 3044F HG A1C LEVEL LT 7.0%: CPT | Mod: CPTII,S$GLB,, | Performed by: INTERNAL MEDICINE

## 2022-11-22 PROCEDURE — 1159F PR MEDICATION LIST DOCUMENTED IN MEDICAL RECORD: ICD-10-PCS | Mod: CPTII,S$GLB,, | Performed by: INTERNAL MEDICINE

## 2022-11-22 PROCEDURE — 99215 PR OFFICE/OUTPT VISIT, EST, LEVL V, 40-54 MIN: ICD-10-PCS | Mod: S$GLB,,, | Performed by: INTERNAL MEDICINE

## 2022-11-22 RX ORDER — ERGOCALCIFEROL 1.25 MG/1
50000 CAPSULE ORAL WEEKLY
Qty: 12 CAPSULE | Refills: 3 | Status: SHIPPED | OUTPATIENT
Start: 2022-11-22 | End: 2023-10-17 | Stop reason: SDUPTHER

## 2022-11-22 RX ORDER — FERROUS SULFATE 325(65) MG
325 TABLET, DELAYED RELEASE (ENTERIC COATED) ORAL EVERY OTHER DAY
Qty: 45 TABLET | Refills: 3 | Status: SHIPPED | OUTPATIENT
Start: 2022-11-22 | End: 2023-10-17 | Stop reason: SDUPTHER
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
## 2022-11-25 ENCOUNTER — IMMUNIZATION (OUTPATIENT)
Dept: PHARMACY | Facility: CLINIC | Age: 52
End: 2022-11-25
Payer: COMMERCIAL

## 2023-06-19 DIAGNOSIS — E78.2 MIXED HYPERLIPIDEMIA: ICD-10-CM

## 2023-06-19 NOTE — TELEPHONE ENCOUNTER
No care due was identified.  [facility] Embedded Care Due Messages. Reference number: [id number].   6/19/2023 8:52:31 AM CDT

## 2023-06-19 NOTE — TELEPHONE ENCOUNTER
Refill Routing Note   Medication(s) are not appropriate for processing by [facility] for the following reason(s):      Required labs outdated    [facility] action(s):  Defer Care Due:  None identified          Appointments  past 12m or future 3m with PCP    Date Provider   Last Visit   11/22/2022 Esme Costello MD   Next Visit   Visit date not found Esme Costello MD   ED visits in past 90 days: 0        Note composed:4:19 PM 06/19/2023

## 2023-06-21 RX ORDER — PRAVASTATIN SODIUM 40 MG/1
40 TABLET ORAL DAILY
Qty: 90 TABLET | Refills: 0 | Status: SHIPPED | OUTPATIENT
Start: 2023-06-21 | End: 2023-09-18

## 2023-06-21 RX ORDER — FENOFIBRATE 160 MG/1
160 TABLET ORAL DAILY
Qty: 90 TABLET | Refills: 0 | Status: SHIPPED | OUTPATIENT
Start: 2023-06-21 | End: 2023-09-18

## 2023-06-24 DIAGNOSIS — E28.319 PREMATURE MENOPAUSE: ICD-10-CM

## 2023-06-26 ENCOUNTER — PATIENT MESSAGE (OUTPATIENT)
Dept: OBSTETRICS AND GYNECOLOGY | Facility: CLINIC | Age: 53
End: 2023-06-26
Payer: COMMERCIAL

## 2023-06-26 DIAGNOSIS — E28.319 PREMATURE MENOPAUSE: ICD-10-CM

## 2023-06-26 RX ORDER — ESTRADIOL 1 MG/1
1 TABLET ORAL DAILY
Qty: 90 TABLET | Refills: 0 | OUTPATIENT
Start: 2023-06-26 | End: 2024-06-25

## 2023-06-26 RX ORDER — PROGESTERONE 100 MG/1
100 CAPSULE ORAL DAILY
Qty: 90 CAPSULE | Refills: 2 | OUTPATIENT
Start: 2023-06-26 | End: 2024-06-25

## 2023-06-27 RX ORDER — ESTRADIOL 1 MG/1
1 TABLET ORAL DAILY
Qty: 90 TABLET | Refills: 1 | Status: SHIPPED | OUTPATIENT
Start: 2023-06-27 | End: 2023-12-31 | Stop reason: SDUPTHER

## 2023-06-27 RX ORDER — PROGESTERONE 100 MG/1
100 CAPSULE ORAL DAILY
Qty: 90 CAPSULE | Refills: 1 | Status: SHIPPED | OUTPATIENT
Start: 2023-06-27 | End: 2023-12-31 | Stop reason: SDUPTHER

## 2023-06-30 ENCOUNTER — PATIENT MESSAGE (OUTPATIENT)
Dept: RESEARCH | Facility: HOSPITAL | Age: 53
End: 2023-06-30
Payer: COMMERCIAL

## 2023-07-19 ENCOUNTER — OFFICE VISIT (OUTPATIENT)
Dept: PODIATRY | Facility: CLINIC | Age: 53
End: 2023-07-19
Payer: COMMERCIAL

## 2023-07-19 VITALS
HEIGHT: 66 IN | BODY MASS INDEX: 25.07 KG/M2 | HEART RATE: 86 BPM | SYSTOLIC BLOOD PRESSURE: 147 MMHG | WEIGHT: 156 LBS | DIASTOLIC BLOOD PRESSURE: 91 MMHG

## 2023-07-19 DIAGNOSIS — S99.929A INJURY OF GREAT TOENAIL: Primary | ICD-10-CM

## 2023-07-19 DIAGNOSIS — M79.674 TOE PAIN, RIGHT: ICD-10-CM

## 2023-07-19 PROCEDURE — 99203 PR OFFICE/OUTPT VISIT, NEW, LEVL III, 30-44 MIN: ICD-10-PCS | Mod: S$GLB,,, | Performed by: PODIATRIST

## 2023-07-19 PROCEDURE — 99999 PR PBB SHADOW E&M-EST. PATIENT-LVL IV: ICD-10-PCS | Mod: PBBFAC,,, | Performed by: PODIATRIST

## 2023-07-19 PROCEDURE — 99203 OFFICE O/P NEW LOW 30 MIN: CPT | Mod: S$GLB,,, | Performed by: PODIATRIST

## 2023-07-19 PROCEDURE — 99999 PR PBB SHADOW E&M-EST. PATIENT-LVL IV: CPT | Mod: PBBFAC,,, | Performed by: PODIATRIST

## 2023-07-19 NOTE — PROGRESS NOTES
PODIATRY NOTE     PATIENT ID:  Esme Louise is a 53 y.o. female.     CHIEF CONCERN:   Nail Problem (Big toenail was broken all the way into the bed of the nail when I stubbed my /toe.)           MEDICAL DECISION MAKING:        ICD-10-CM ICD-9-CM    1. Injury of great toenail  S99.929A 959.7     Right great toe      2. Toe pain, right  M79.674 729.5           I counseled the patient on the patient's conditions, their implications and medical management.    Loose distal edge of nail trimmed,  no acute infection.   Keep protected with bandaid when in shoes.  Continue to monitor.     No activity restrictions.             HISTORY OF PRESENT ILLNESS:  Esme Louise is a 53 y.o. female with concerns regarding: Nail Problem (Big toenail was broken all the way into the bed of the nail when I stubbed my /toe.)  Patient reports symptoms/signs have been present about a week.   Sensitive and tender with direct palpation.       Patient Active Problem List   Diagnosis    Pain of cervical facet joint    Facet arthropathy, cervical    Occipital neuralgia    Endometriosis    Premature ovarian failure    Essential hypertension    Hypothyroidism    Migraine without status migrainosus, not intractable    Mixed hyperlipidemia    Rheumatoid arthritis    Osteopenia    GERD (gastroesophageal reflux disease)    PTSD (post-traumatic stress disorder)    Depression    Fatigue    Gastritis without bleeding    Colon polyp    Overweight with body mass index (BMI) 25.0-29.9    Vitamin D deficiency    Mixed anxiety and depressive disorder    Decreased range of motion of neck    Neck muscle weakness    Poor posture    Weakness of both upper extremities    Hepatic steatosis    MIRYAM (iron deficiency anemia)    LYSSA (obstructive sleep apnea)           Current Outpatient Medications on File Prior to Visit   Medication Sig Dispense Refill    abatacept/maltose (ORENCIA, WITH MALTOSE, IV) Inject into the vein.      butalbital-acetaminophen-caff  -40 mg Cap TK ONE C PO Q 4 HOURS  0    clonazePAM (KLONOPIN) 1 MG tablet       cyclobenzaprine (AMRIX) 15 MG 24 hr capsule TAKE 1 CAPSULE BY MOUTH DAILY AT 6 IN THE EVENING compounded especially for you by the pharmacist  3    diclofenac (VOLTAREN) 50 MG EC tablet TK 1 T PO BID  0    ergocalciferol (ERGOCALCIFEROL) 50,000 unit Cap Take 1 capsule (50,000 Units total) by mouth once a week. Twice a week for 1 month, then resume once a week 12 capsule 3    estradioL (ESTRACE) 1 MG tablet Take 1 tablet (1 mg total) by mouth once daily. 90 tablet 1    fenofibrate 160 MG Tab Take 1 tablet (160 mg total) by mouth once daily. 90 tablet 0    ferrous sulfate 325 (65 FE) MG EC tablet Take 1 tablet (325 mg total) by mouth every other day. 45 tablet 3    levomefolate/B12/herb 236 (RHEUMATE ORAL) Take 1 tablet by mouth Daily. stopped      levothyroxine (SYNTHROID) 100 MCG tablet Take 1 tablet (100 mcg total) by mouth once daily. 90 tablet 3    losartan (COZAAR) 50 MG tablet Take 1 tablet (50 mg total) by mouth once daily. 90 tablet 3    methotrexate 2.5 MG Tab Take by mouth every 7 days.      NUVIGIL 250 mg tablet TK 1 T PO QD  5    omega-3 fatty acids/fish oil (FISH OIL-OMEGA-3 FATTY ACIDS) 300-1,000 mg capsule Take by mouth once daily.      onabotulinumtoxinA (BOTOX INJ) Inject as directed.      pantoprazole (PROTONIX) 20 MG tablet Take 1 tablet (20 mg total) by mouth once daily. 90 tablet 3    pravastatin (PRAVACHOL) 40 MG tablet Take 1 tablet (40 mg total) by mouth once daily. 90 tablet 0    progesterone (PROMETRIUM) 100 MG capsule Take 1 capsule (100 mg total) by mouth once daily. 90 capsule 1    propranolol (INDERAL) 10 MG tablet       sodium chloride 0.9% SolP 100 mL with abatacept (with maltose) 250 mg SolR abatacept   750MG 1X MONTH      tizanidine 2 mg Cap Take 2 mg by mouth as needed.      VIIBRYD 10 mg Tab tablet Take 1 tablet (10 mg total) by mouth once daily. (Patient taking differently: Take 15 mg by mouth  "once daily.) 7 tablet 0    zolmitriptan (ZOMIG) 5 MG tablet TK 1 T PO  ONCE PRN HA  3     No current facility-administered medications on file prior to visit.           Review of patient's allergies indicates:   Allergen Reactions    Pcn [penicillins] Swelling    Zoloft [sertraline] Hives and Swelling               ROS:   General ROS: negative for - chills, fever or night sweats  Respiratory ROS: no cough, shortness of breath, or wheezing  Cardiovascular ROS: no chest pain or dyspnea on exertion      EXAM:     Vitals:    07/19/23 0807   BP: (!) 147/91   Pulse: 86   Weight: 70.8 kg (156 lb)   Height: 5' 6" (1.676 m)        General:  Alert and Oriented x 3;  No acute distress      Right  Lower extremity exam:    Vascular:   Dorsalis Pedis:  present   Posterior Tibial:  present  Capillary refill time:  3 seconds  Temperature of toes warm to touch  Edema:  none       Neurological:     Sharp touch:  normal  Light touch: normal  Tinels Sign:  Absent  Mulders Click:   Absent        Dermatological:   Skin: warm, moist, and appropriate for age  Wounds/Ulcers:  Absent  Bruising:  Absent  Erythema:  Absent  Incomplete horizontal split toenail at the distal aspect at nail attachment.  No drainage.  No cellulitis.  Toenail clear and intact.       Musculoskeletal:   Metatarsophalangeal range of motion:   full range of motion  Subtalar joint range of motion: full range of motion  Ankle joint range of motion:  full range of motion  Bunions:  Absent  Hammertoes: Absent    "

## 2023-07-29 ENCOUNTER — PATIENT MESSAGE (OUTPATIENT)
Dept: OBSTETRICS AND GYNECOLOGY | Facility: CLINIC | Age: 53
End: 2023-07-29
Payer: COMMERCIAL

## 2023-07-29 DIAGNOSIS — E28.319 PREMATURE MENOPAUSE: Primary | ICD-10-CM

## 2023-07-31 DIAGNOSIS — I10 ESSENTIAL HYPERTENSION: ICD-10-CM

## 2023-07-31 RX ORDER — LOSARTAN POTASSIUM 50 MG/1
50 TABLET ORAL
Qty: 90 TABLET | Refills: 0 | Status: SHIPPED | OUTPATIENT
Start: 2023-07-31 | End: 2023-10-17 | Stop reason: SDUPTHER

## 2023-08-02 ENCOUNTER — PATIENT MESSAGE (OUTPATIENT)
Dept: OBSTETRICS AND GYNECOLOGY | Facility: CLINIC | Age: 53
End: 2023-08-02
Payer: COMMERCIAL

## 2023-08-02 NOTE — TELEPHONE ENCOUNTER
Called pt as second attempt to get home BP and schedule her annual with a new provider to est care when she is due Nov 23.   LVM  Routing for third attempt

## 2023-08-02 NOTE — TELEPHONE ENCOUNTER
LVM for patient to return call to office. Please see message below for regards. Thanks.     Routed to Dr. Winston staff for second attempt. Thanks,

## 2023-08-17 ENCOUNTER — ON-DEMAND VIRTUAL (OUTPATIENT)
Dept: URGENT CARE | Facility: CLINIC | Age: 53
End: 2023-08-17
Payer: COMMERCIAL

## 2023-08-17 DIAGNOSIS — T75.3XXA MOTION SICKNESS, INITIAL ENCOUNTER: Primary | ICD-10-CM

## 2023-08-17 DIAGNOSIS — Z76.0 ENCOUNTER FOR MEDICATION REFILL: ICD-10-CM

## 2023-08-17 PROCEDURE — 99213 PR OFFICE/OUTPT VISIT, EST, LEVL III, 20-29 MIN: ICD-10-PCS | Mod: 95,,, | Performed by: NURSE PRACTITIONER

## 2023-08-17 PROCEDURE — 99213 OFFICE O/P EST LOW 20 MIN: CPT | Mod: 95,,, | Performed by: NURSE PRACTITIONER

## 2023-08-17 RX ORDER — SCOLOPAMINE TRANSDERMAL SYSTEM 1 MG/1
1 PATCH, EXTENDED RELEASE TRANSDERMAL
Qty: 4 PATCH | Refills: 0 | Status: SHIPPED | OUTPATIENT
Start: 2023-08-17

## 2023-08-17 RX ORDER — PANTOPRAZOLE SODIUM 20 MG/1
20 TABLET, DELAYED RELEASE ORAL DAILY
Qty: 30 TABLET | Refills: 0 | Status: SHIPPED | OUTPATIENT
Start: 2023-08-17 | End: 2023-09-16

## 2023-08-17 RX ORDER — ONDANSETRON 4 MG/1
4 TABLET, ORALLY DISINTEGRATING ORAL EVERY 6 HOURS PRN
Qty: 12 TABLET | Refills: 0 | Status: SHIPPED | OUTPATIENT
Start: 2023-08-17

## 2023-08-17 NOTE — PROGRESS NOTES
Subjective:      Patient ID: Esme Louise is a 53 y.o. female.    Vitals:  vitals were not taken for this visit.     Chief Complaint: Medication Refill (Needs protonix 20 mg refill)      Visit Type: TELE AUDIOVISUAL    Present with the patient at the time of consultation: TELEMED PRESENT WITH PATIENT: None    Past Medical History:   Diagnosis Date    Anemia     Anxiety     Arthritis     Arthritis     Breast disorder     Depression     Endometriosis     General anesthetics causing adverse effect in therapeutic use     Headaches, cluster     Hypertension     Mental disorder     Occipital neuralgia     PONV (postoperative nausea and vomiting)     Premature ovarian failure     Thyroid disease      Past Surgical History:   Procedure Laterality Date    BREAST LUMPECTOMY Right     benign     CHOLECYSTECTOMY  July 13, 2022    COLON SURGERY      surgical CLIP- POST-POLYP    ENDOMETRIAL ABLATION      endometriosis     LAPAROSCOPIC CHOLECYSTECTOMY N/A 07/13/2022    Procedure: CHOLECYSTECTOMY, LAPAROSCOPIC;  Surgeon: Lui Hernandez Jr., MD;  Location: [facility];  Service: General;  Laterality: N/A;    OCCIPITAL NERVE STIMULATOR INSERTION  2015    OOPHORECTOMY Right     d/t endometriosis      Review of patient's allergies indicates:   Allergen Reactions    Pcn [penicillins] Swelling    Zoloft [sertraline] Hives and Swelling     Current Outpatient Medications on File Prior to Visit   Medication Sig Dispense Refill    abatacept/maltose (ORENCIA, WITH MALTOSE, IV) Inject into the vein.      butalbital-acetaminophen-caff -40 mg Cap TK ONE C PO Q 4 HOURS  0    clonazePAM (KLONOPIN) 1 MG tablet       cyclobenzaprine (AMRIX) 15 MG 24 hr capsule TAKE 1 CAPSULE BY MOUTH DAILY AT 6 IN THE EVENING compounded especially for you by the pharmacist  3    diclofenac (VOLTAREN) 50 MG EC tablet TK 1 T PO BID  0    ergocalciferol (ERGOCALCIFEROL) 50,000 unit Cap Take 1 capsule (50,000 Units total) by mouth once a week. Twice a week for 1  month, then resume once a week 12 capsule 3    estradioL (ESTRACE) 1 MG tablet Take 1 tablet (1 mg total) by mouth once daily. 90 tablet 1    fenofibrate 160 MG Tab Take 1 tablet (160 mg total) by mouth once daily. 90 tablet 0    ferrous sulfate 325 (65 FE) MG EC tablet Take 1 tablet (325 mg total) by mouth every other day. 45 tablet 3    levomefolate/B12/herb 236 (RHEUMATE ORAL) Take 1 tablet by mouth Daily. stopped      levothyroxine (SYNTHROID) 100 MCG tablet Take 1 tablet (100 mcg total) by mouth once daily. 90 tablet 3    losartan (COZAAR) 50 MG tablet TAKE ONE TABLET BY MOUTH ONCE DAILY 90 tablet 0    methotrexate 2.5 MG Tab Take by mouth every 7 days.      NUVIGIL 250 mg tablet TK 1 T PO QD  5    omega-3 fatty acids/fish oil (FISH OIL-OMEGA-3 FATTY ACIDS) 300-1,000 mg capsule Take by mouth once daily.      onabotulinumtoxinA (BOTOX INJ) Inject as directed.      pantoprazole (PROTONIX) 20 MG tablet Take 1 tablet (20 mg total) by mouth once daily. 90 tablet 3    pravastatin (PRAVACHOL) 40 MG tablet Take 1 tablet (40 mg total) by mouth once daily. 90 tablet 0    progesterone (PROMETRIUM) 100 MG capsule Take 1 capsule (100 mg total) by mouth once daily. 90 capsule 1    propranolol (INDERAL) 10 MG tablet       sodium chloride 0.9% SolP 100 mL with abatacept (with maltose) 250 mg SolR abatacept   750MG 1X MONTH      tizanidine 2 mg Cap Take 2 mg by mouth as needed.      VIIBRYD 10 mg Tab tablet Take 1 tablet (10 mg total) by mouth once daily. (Patient taking differently: Take 15 mg by mouth once daily.) 7 tablet 0    zolmitriptan (ZOMIG) 5 MG tablet TK 1 T PO  ONCE PRN HA  3     No current facility-administered medications on file prior to visit.     Family History   Problem Relation Age of Onset    Autoimmune disease Maternal Uncle     Diabetes Maternal Grandmother     Miscarriages / Stillbirths Maternal Grandmother     Colon cancer Maternal Grandfather 70    Cancer Maternal Grandfather     Schizophrenia Father      Suicide Father     Depression Father         Paranoid schizophrenic    Mental illness Father         paranoid schizophrenia    Breast cancer Neg Hx     Eclampsia Neg Hx     Hypertension Neg Hx     Ovarian cancer Neg Hx        Medications Ordered                [address] [address]    Telephone: [phone] x0   Fax: [phone]   Hours: Not open 24 hours                         E-Prescribed (3 of 3)              ondansetron (ZOFRAN-ODT) 4 MG TbDL    Sig: Take 1 tablet (4 mg total) by mouth every 6 (six) hours as needed (nausea).       Start: 8/17/23     Quantity: 12 tablet Refills: 0                         pantoprazole (PROTONIX) 20 MG tablet    Sig: Take 1 tablet (20 mg total) by mouth once daily.       Start: 8/17/23     Quantity: 30 tablet Refills: 0                         scopolamine (TRANSDERM-SCOP) 1.3-1.5 mg (1 mg over 3 days)    Sig: Place 1 patch onto the skin every 72 hours.       Start: 8/17/23     Quantity: 4 patch Refills: 0                           Ohs Peq Odvv Intake    8/17/2023  1:48 PM CDT - Filed by Patient   Describe your reason for todays visit Request for an rx refill and request for motion sickness meds rx   What is your current physical address in the event of a medical emergency? [address] [place] [address]   Are you able to take your vital signs? Yes   Systolic Blood Pressure: 128   Diastolic Blood Pressure: 87   Weight: 160   Height: 66   Pulse: 69   Temperature: 98.2   Respiration rate:    Pulse Oxygen: 99   Please attach any relevant images or files          This is a 53 year old female who calls in today with requests for medication refill of her protonix since she is currently trying to find a new PCP. She is also requesting medication for motion sickness since she will be traveling to [place].         Constitution: Negative.   HENT: Negative.     Cardiovascular: Negative.    Respiratory:  Negative.     Gastrointestinal: Negative.    Psychiatric/Behavioral: Negative.          Objective:   The physical exam was conducted virtually.  Physical Exam   Constitutional: She is oriented to person, place, and time. No distress.   HENT:   Head: Normocephalic and atraumatic.   Mouth/Throat: Oropharynx is clear and moist and mucous membranes are normal.   Eyes: Conjunctivae are normal. No scleral icterus.   Pulmonary/Chest: Effort normal. No respiratory distress.   Musculoskeletal: Normal range of motion.         General: Normal range of motion.   Neurological: She is alert and oriented to person, place, and time.   Skin: Skin is warm, dry and not diaphoretic.         Comments: Per patient   Psychiatric: Her behavior is normal. Judgment and thought content normal.   Vitals reviewed.      Assessment:     1. Motion sickness, initial encounter    2. Encounter for medication refill        Plan:       Motion sickness, initial encounter  -     scopolamine (TRANSDERM-SCOP) 1.3-1.5 mg (1 mg over 3 days); Place 1 patch onto the skin every 72 hours.  Dispense: 4 patch; Refill: 0  -     ondansetron (ZOFRAN-ODT) 4 MG TbDL; Take 1 tablet (4 mg total) by mouth every 6 (six) hours as needed (nausea).  Dispense: 12 tablet; Refill: 0    Encounter for medication refill  -     pantoprazole (PROTONIX) 20 MG tablet; Take 1 tablet (20 mg total) by mouth once daily.  Dispense: 30 tablet; Refill: 0

## 2023-08-23 DIAGNOSIS — Z12.31 OTHER SCREENING MAMMOGRAM: ICD-10-CM

## 2023-09-16 DIAGNOSIS — E78.2 MIXED HYPERLIPIDEMIA: ICD-10-CM

## 2023-09-16 NOTE — TELEPHONE ENCOUNTER
Care Due:                  Date            Visit Type   Department     Provider  --------------------------------------------------------------------------------    Last Visit: None Found      None         None Found                                           [facility]  Next Visit: 10-      [facility]       Judy Bee                                                            Last  Test          Frequency    Reason                     Performed    Due Date  --------------------------------------------------------------------------------    CBC.........  12 months..  fenofibrate..............  10-   10-    CMP.........  12 months..  fenofibrate, losartan,     05-   05-                             pravastatin..............    Lipid Panel.  12 months..  fenofibrate, pravastatin.  05-   05-    [facility] Embedded Care Due Messages. Reference number: [id number].   9/16/2023 12:23:38 PM CDT

## 2023-09-18 RX ORDER — FENOFIBRATE 160 MG/1
160 TABLET ORAL
Qty: 30 TABLET | Refills: 0 | Status: SHIPPED | OUTPATIENT
Start: 2023-09-18 | End: 2023-10-17 | Stop reason: SDUPTHER

## 2023-09-18 RX ORDER — PRAVASTATIN SODIUM 40 MG/1
40 TABLET ORAL
Qty: 30 TABLET | Refills: 0 | Status: SHIPPED | OUTPATIENT
Start: 2023-09-18 | End: 2023-10-17 | Stop reason: SDUPTHER

## 2023-09-18 NOTE — TELEPHONE ENCOUNTER
Refill Routing Note   Medication(s) are not appropriate for processing by [facility] for the following reason(s):      Patient not seen by provider within 15 months  Required labs outdated    [facility] action(s):  Defer Care Due:  Labs due            Appointments  past 12m or future 3m with PCP    Date Provider   Last Visit   Visit date not found Alexander Winston, DO   Next Visit   Visit date not found Alexander Winston, DO   ED visits in past 90 days: 0        Note composed:4:17 AM 09/18/2023

## 2023-09-19 NOTE — TELEPHONE ENCOUNTER
Called, LVM to informed patient medical refill request approved and sent to preferred pharmacy and if able to send us a recent home blood pressur reading for our records that patient is able to send the reading through [employer].

## 2023-10-17 ENCOUNTER — OFFICE VISIT (OUTPATIENT)
Dept: FAMILY MEDICINE | Facility: CLINIC | Age: 53
End: 2023-10-17
Attending: FAMILY MEDICINE
Payer: COMMERCIAL

## 2023-10-17 VITALS
HEART RATE: 75 BPM | SYSTOLIC BLOOD PRESSURE: 145 MMHG | RESPIRATION RATE: 16 BRPM | HEIGHT: 66 IN | WEIGHT: 162.13 LBS | DIASTOLIC BLOOD PRESSURE: 88 MMHG | BODY MASS INDEX: 26.06 KG/M2

## 2023-10-17 DIAGNOSIS — E78.2 MIXED HYPERLIPIDEMIA: ICD-10-CM

## 2023-10-17 DIAGNOSIS — M85.80 OSTEOPENIA, UNSPECIFIED LOCATION: ICD-10-CM

## 2023-10-17 DIAGNOSIS — E03.9 HYPOTHYROIDISM, UNSPECIFIED TYPE: ICD-10-CM

## 2023-10-17 DIAGNOSIS — I10 ESSENTIAL HYPERTENSION: ICD-10-CM

## 2023-10-17 DIAGNOSIS — Z00.00 ANNUAL PHYSICAL EXAM: Primary | ICD-10-CM

## 2023-10-17 DIAGNOSIS — D50.9 IRON DEFICIENCY ANEMIA, UNSPECIFIED IRON DEFICIENCY ANEMIA TYPE: ICD-10-CM

## 2023-10-17 LAB
BILIRUB UR QL STRIP: NEGATIVE
CLARITY UR REFRACT.AUTO: CLEAR
COLOR UR AUTO: YELLOW
GLUCOSE UR QL STRIP: NEGATIVE
HGB UR QL STRIP: NEGATIVE
KETONES UR QL STRIP: NEGATIVE
LEUKOCYTE ESTERASE UR QL STRIP: NEGATIVE
NITRITE UR QL STRIP: NEGATIVE
PH UR STRIP: 7 [PH] (ref 5–8)
PROT UR QL STRIP: NEGATIVE
SP GR UR STRIP: 1.02 (ref 1–1.03)
URN SPEC COLLECT METH UR: NORMAL

## 2023-10-17 PROCEDURE — 99999 PR PBB SHADOW E&M-EST. PATIENT-LVL V: CPT | Mod: PBBFAC,,, | Performed by: FAMILY MEDICINE

## 2023-10-17 PROCEDURE — 90677 PNEUMOCOCCAL CONJUGATE VACCINE 20-VALENT: ICD-10-PCS | Mod: S$GLB,,, | Performed by: FAMILY MEDICINE

## 2023-10-17 PROCEDURE — 90471 PNEUMOCOCCAL CONJUGATE VACCINE 20-VALENT: ICD-10-PCS | Mod: S$GLB,,, | Performed by: FAMILY MEDICINE

## 2023-10-17 PROCEDURE — 81003 URINALYSIS AUTO W/O SCOPE: CPT | Performed by: FAMILY MEDICINE

## 2023-10-17 PROCEDURE — 99396 PREV VISIT EST AGE 40-64: CPT | Mod: 25,S$GLB,, | Performed by: FAMILY MEDICINE

## 2023-10-17 PROCEDURE — 90677 PCV20 VACCINE IM: CPT | Mod: S$GLB,,, | Performed by: FAMILY MEDICINE

## 2023-10-17 PROCEDURE — 99999 PR PBB SHADOW E&M-EST. PATIENT-LVL V: ICD-10-PCS | Mod: PBBFAC,,, | Performed by: FAMILY MEDICINE

## 2023-10-17 PROCEDURE — 90471 IMMUNIZATION ADMIN: CPT | Mod: S$GLB,,, | Performed by: FAMILY MEDICINE

## 2023-10-17 PROCEDURE — 99396 PR PREVENTIVE VISIT,EST,40-64: ICD-10-PCS | Mod: 25,S$GLB,, | Performed by: FAMILY MEDICINE

## 2023-10-17 RX ORDER — FENOFIBRATE 160 MG/1
160 TABLET ORAL DAILY
Qty: 90 TABLET | Refills: 3 | Status: SHIPPED | OUTPATIENT
Start: 2023-10-17

## 2023-10-17 RX ORDER — PRAVASTATIN SODIUM 40 MG/1
40 TABLET ORAL DAILY
Qty: 90 TABLET | Refills: 3 | Status: SHIPPED | OUTPATIENT
Start: 2023-10-17

## 2023-10-17 RX ORDER — FERROUS SULFATE 325(65) MG
325 TABLET, DELAYED RELEASE (ENTERIC COATED) ORAL EVERY OTHER DAY
Qty: 45 TABLET | Refills: 3 | Status: SHIPPED | OUTPATIENT
Start: 2023-10-17 | End: 2024-10-16

## 2023-10-17 RX ORDER — PROPRANOLOL HYDROCHLORIDE 20 MG/1
20 TABLET ORAL 2 TIMES DAILY
Qty: 180 TABLET | Refills: 0 | Status: SHIPPED | OUTPATIENT
Start: 2023-10-17 | End: 2024-02-02

## 2023-10-17 RX ORDER — ERGOCALCIFEROL 1.25 MG/1
50000 CAPSULE ORAL WEEKLY
Qty: 12 CAPSULE | Refills: 3 | Status: SHIPPED | OUTPATIENT
Start: 2023-10-17 | End: 2024-10-16

## 2023-10-17 RX ORDER — LEVOTHYROXINE SODIUM 100 UG/1
100 TABLET ORAL DAILY
Qty: 90 TABLET | Refills: 3 | Status: SHIPPED | OUTPATIENT
Start: 2023-10-17

## 2023-10-17 RX ORDER — LOSARTAN POTASSIUM 50 MG/1
50 TABLET ORAL DAILY
Qty: 90 TABLET | Refills: 3 | Status: SHIPPED | OUTPATIENT
Start: 2023-10-17

## 2023-10-17 NOTE — PATIENT INSTRUCTIONS
Esme,     We are always striving for excellence. Should you receive a patient experience survey via text message, electronically, or by mail, we would appreciate if you would take a few moments to give us your feedback. These surveys let us know our strengths as well as areas of opportunity for improvement to better serve you.    Thank you for your time,  Lacey Williamson LPN      Your test results will be communicated to you via : [facility], Telephone or Letter.   If you have not received your test results in one week, please contact the clinic at [phone].

## 2023-10-18 NOTE — PROGRESS NOTES
Subjective:       Patient ID: Esme Louise is a 53 y.o. female.    Chief Complaint: No chief complaint on file.    HPI  Pt is here for annual exam establish care pt is generally well no sob/cp no cough chest congestion no sore throat uri symptoms  Pt denies n/v/f/c/d/c no change in bowel habits no brbpr pt has iron defcy anemia no light headedness   Pt denies dysuria hematuria no abnl vaginal bleeding  Pt has hypothyroid she is on synthroid but has been out of her meds for a couple of weeks with fatigue  Pt is on vit d told she had thin bones as she has had chronic low vit d pt knows I am unable to check her level  Pt has htn no sob/cp on arb bp elevated on multiple meds she would like to increase the b blocker warned of excessive fatigue   Review of Systems   Constitutional:  Positive for fatigue. Negative for activity change, chills, diaphoresis and fever.   HENT:  Negative for congestion, ear discharge, ear pain, hearing loss, postnasal drip, rhinorrhea, sinus pressure, sneezing, sore throat, trouble swallowing and voice change.    Eyes:  Negative for photophobia, discharge, redness, itching and visual disturbance.   Respiratory:  Negative for cough, chest tightness, shortness of breath and wheezing.    Cardiovascular:  Negative for chest pain, palpitations and leg swelling.   Gastrointestinal:  Negative for abdominal pain, anal bleeding, blood in stool, constipation, diarrhea, nausea, rectal pain and vomiting.   Genitourinary:  Negative for dyspareunia, dysuria, flank pain, frequency, hematuria, menstrual problem, pelvic pain, urgency, vaginal bleeding and vaginal discharge.   Musculoskeletal:  Negative for arthralgias, back pain, joint swelling and neck pain.   Skin:  Negative for color change and rash.   Neurological:  Negative for dizziness, speech difficulty, weakness, light-headedness, numbness and headaches.   Hematological:  Does not bruise/bleed easily.   Psychiatric/Behavioral:  Negative for  "agitation, confusion, decreased concentration, sleep disturbance and suicidal ideas. The patient is not nervous/anxious.        Objective:    BP (!) 145/88   Pulse 75   Resp 16   Ht 5' 6" (1.676 m)   Wt 73.5 kg (162 lb 1.6 oz)   LMP 03/20/2018 (Approximate)   BMI 26.16 kg/m²     Physical Exam  Constitutional:       Appearance: Normal appearance. She is well-developed. She is not ill-appearing.   HENT:      Head: Normocephalic and atraumatic.      Right Ear: Tympanic membrane and external ear normal.      Left Ear: Tympanic membrane and external ear normal.      Nose: Nose normal.   Eyes:      General:         Right eye: No discharge.         Left eye: No discharge.      Extraocular Movements: Extraocular movements intact.      Conjunctiva/sclera: Conjunctivae normal.      Pupils: Pupils are equal, round, and reactive to light.   Neck:      Thyroid: No thyromegaly.   Cardiovascular:      Rate and Rhythm: Normal rate and regular rhythm.      Heart sounds: Normal heart sounds. No murmur heard.     No friction rub. No gallop.   Pulmonary:      Effort: Pulmonary effort is normal.      Breath sounds: Normal breath sounds. No wheezing or rales.   Abdominal:      General: Bowel sounds are normal. There is no distension.      Palpations: Abdomen is soft.      Tenderness: There is no abdominal tenderness. There is no guarding or rebound.   Genitourinary:     Vagina: Normal.      Comments: declined  Musculoskeletal:         General: No tenderness. Normal range of motion.      Cervical back: Normal range of motion and neck supple.   Lymphadenopathy:      Cervical: No cervical adenopathy.   Skin:     General: Skin is warm and dry.      Findings: No erythema or rash.   Neurological:      General: No focal deficit present.      Mental Status: She is alert and oriented to person, place, and time.      Cranial Nerves: No cranial nerve deficit.      Motor: No abnormal muscle tone.      Coordination: Coordination normal. " "  Psychiatric:         Behavior: Behavior normal.         Thought Content: Thought content normal.         Judgment: Judgment normal.         Assessment:       1. Annual physical exam    2. Essential hypertension    3. Mixed hyperlipidemia    4. Iron deficiency anemia, unspecified iron deficiency anemia type    5. Hypothyroidism, unspecified type    6. Osteopenia, unspecified location        Plan:     Orders cmp cbc lipid tsh urine  Cont meds  Increase b blocker  Low fat low salt diet  High iron diet  Weight bearing graded exercise program  Increase water intake  F/u gyn  Rtc 6 months and 2 weeks virtual with bp log      Health maintenance  Discussed with pt   "This note will not be shared with the patient."   "

## 2023-10-19 ENCOUNTER — TELEPHONE (OUTPATIENT)
Dept: OBSTETRICS AND GYNECOLOGY | Facility: HOSPITAL | Age: 53
End: 2023-10-19
Payer: COMMERCIAL

## 2023-10-19 ENCOUNTER — LAB VISIT (OUTPATIENT)
Dept: LAB | Facility: OTHER | Age: 53
End: 2023-10-19
Attending: FAMILY MEDICINE
Payer: COMMERCIAL

## 2023-10-19 DIAGNOSIS — I10 ESSENTIAL HYPERTENSION: ICD-10-CM

## 2023-10-19 DIAGNOSIS — E28.319 PREMATURE MENOPAUSE: ICD-10-CM

## 2023-10-19 DIAGNOSIS — Z00.00 ANNUAL PHYSICAL EXAM: ICD-10-CM

## 2023-10-19 LAB
ALBUMIN SERPL BCP-MCNC: 4.3 G/DL (ref 3.5–5.2)
ALP SERPL-CCNC: 126 U/L (ref 55–135)
ALT SERPL W/O P-5'-P-CCNC: 73 U/L (ref 10–44)
ANION GAP SERPL CALC-SCNC: 13 MMOL/L (ref 8–16)
AST SERPL-CCNC: 38 U/L (ref 10–40)
BASOPHILS # BLD AUTO: 0.08 K/UL (ref 0–0.2)
BASOPHILS NFR BLD: 0.7 % (ref 0–1.9)
BILIRUB SERPL-MCNC: 0.5 MG/DL (ref 0.1–1)
BUN SERPL-MCNC: 14 MG/DL (ref 6–20)
CALCIUM SERPL-MCNC: 10 MG/DL (ref 8.7–10.5)
CHLORIDE SERPL-SCNC: 105 MMOL/L (ref 95–110)
CHOLEST SERPL-MCNC: 284 MG/DL (ref 120–199)
CHOLEST/HDLC SERPL: 5.1 {RATIO} (ref 2–5)
CO2 SERPL-SCNC: 22 MMOL/L (ref 23–29)
CREAT SERPL-MCNC: 0.8 MG/DL (ref 0.5–1.4)
DIFFERENTIAL METHOD: ABNORMAL
EOSINOPHIL # BLD AUTO: 0.2 K/UL (ref 0–0.5)
EOSINOPHIL NFR BLD: 1.6 % (ref 0–8)
ERYTHROCYTE [DISTWIDTH] IN BLOOD BY AUTOMATED COUNT: 13.8 % (ref 11.5–14.5)
EST. GFR  (NO RACE VARIABLE): >60 ML/MIN/1.73 M^2
ESTIMATED AVG GLUCOSE: 103 MG/DL (ref 68–131)
ESTRADIOL SERPL-MCNC: 49 PG/ML
FSH SERPL-ACNC: 64.44 MIU/ML
GLUCOSE SERPL-MCNC: 111 MG/DL (ref 70–110)
HBA1C MFR BLD: 5.2 % (ref 4–5.6)
HCT VFR BLD AUTO: 43 % (ref 37–48.5)
HDLC SERPL-MCNC: 56 MG/DL (ref 40–75)
HDLC SERPL: 19.7 % (ref 20–50)
HGB BLD-MCNC: 14.1 G/DL (ref 12–16)
IMM GRANULOCYTES # BLD AUTO: 0.04 K/UL (ref 0–0.04)
IMM GRANULOCYTES NFR BLD AUTO: 0.3 % (ref 0–0.5)
LDLC SERPL CALC-MCNC: 167 MG/DL (ref 63–159)
LYMPHOCYTES # BLD AUTO: 2.7 K/UL (ref 1–4.8)
LYMPHOCYTES NFR BLD: 23.1 % (ref 18–48)
MCH RBC QN AUTO: 30.2 PG (ref 27–31)
MCHC RBC AUTO-ENTMCNC: 32.8 G/DL (ref 32–36)
MCV RBC AUTO: 92 FL (ref 82–98)
MONOCYTES # BLD AUTO: 0.8 K/UL (ref 0.3–1)
MONOCYTES NFR BLD: 7.1 % (ref 4–15)
NEUTROPHILS # BLD AUTO: 7.8 K/UL (ref 1.8–7.7)
NEUTROPHILS NFR BLD: 67.2 % (ref 38–73)
NONHDLC SERPL-MCNC: 228 MG/DL
NRBC BLD-RTO: 0 /100 WBC
PLATELET # BLD AUTO: 437 K/UL (ref 150–450)
PMV BLD AUTO: 9.5 FL (ref 9.2–12.9)
POTASSIUM SERPL-SCNC: 4.4 MMOL/L (ref 3.5–5.1)
PROT SERPL-MCNC: 8.1 G/DL (ref 6–8.4)
RBC # BLD AUTO: 4.67 M/UL (ref 4–5.4)
SODIUM SERPL-SCNC: 140 MMOL/L (ref 136–145)
T4 FREE SERPL-MCNC: 0.6 NG/DL (ref 0.71–1.51)
TRIGL SERPL-MCNC: 305 MG/DL (ref 30–150)
TSH SERPL DL<=0.005 MIU/L-ACNC: 21.03 UIU/ML (ref 0.4–4)
TSH SERPL DL<=0.005 MIU/L-ACNC: 21.03 UIU/ML (ref 0.4–4)
WBC # BLD AUTO: 11.56 K/UL (ref 3.9–12.7)

## 2023-10-19 PROCEDURE — 82670 ASSAY OF TOTAL ESTRADIOL: CPT | Performed by: OBSTETRICS & GYNECOLOGY

## 2023-10-19 PROCEDURE — 84443 ASSAY THYROID STIM HORMONE: CPT | Performed by: FAMILY MEDICINE

## 2023-10-19 PROCEDURE — 83036 HEMOGLOBIN GLYCOSYLATED A1C: CPT | Performed by: FAMILY MEDICINE

## 2023-10-19 PROCEDURE — 80053 COMPREHEN METABOLIC PANEL: CPT | Performed by: FAMILY MEDICINE

## 2023-10-19 PROCEDURE — 84439 ASSAY OF FREE THYROXINE: CPT | Performed by: FAMILY MEDICINE

## 2023-10-19 PROCEDURE — 83001 ASSAY OF GONADOTROPIN (FSH): CPT | Performed by: OBSTETRICS & GYNECOLOGY

## 2023-10-19 PROCEDURE — 80061 LIPID PANEL: CPT | Performed by: FAMILY MEDICINE

## 2023-10-19 PROCEDURE — 36415 COLL VENOUS BLD VENIPUNCTURE: CPT | Performed by: FAMILY MEDICINE

## 2023-10-19 PROCEDURE — 85025 COMPLETE CBC W/AUTO DIFF WBC: CPT | Performed by: FAMILY MEDICINE

## 2023-10-19 NOTE — TELEPHONE ENCOUNTER
I called pt to talk about her thyroid level. She was off meds for 3 weeks and now back on because she was out of meds. Fatigue. Await the rest of her labs. On HRT

## 2023-11-02 ENCOUNTER — OFFICE VISIT (OUTPATIENT)
Dept: FAMILY MEDICINE | Facility: CLINIC | Age: 53
End: 2023-11-02
Attending: FAMILY MEDICINE
Payer: COMMERCIAL

## 2023-11-02 ENCOUNTER — PATIENT MESSAGE (OUTPATIENT)
Dept: FAMILY MEDICINE | Facility: CLINIC | Age: 53
End: 2023-11-02

## 2023-11-02 VITALS
SYSTOLIC BLOOD PRESSURE: 138 MMHG | WEIGHT: 160 LBS | DIASTOLIC BLOOD PRESSURE: 83 MMHG | BODY MASS INDEX: 25.71 KG/M2 | HEART RATE: 65 BPM | HEIGHT: 66 IN

## 2023-11-02 DIAGNOSIS — I10 ESSENTIAL HYPERTENSION: Primary | ICD-10-CM

## 2023-11-02 DIAGNOSIS — E78.2 MIXED HYPERLIPIDEMIA: ICD-10-CM

## 2023-11-02 DIAGNOSIS — E03.9 ACQUIRED HYPOTHYROIDISM: ICD-10-CM

## 2023-11-02 DIAGNOSIS — F41.8 MIXED ANXIETY AND DEPRESSIVE DISORDER: ICD-10-CM

## 2023-11-02 PROCEDURE — 99214 OFFICE O/P EST MOD 30 MIN: CPT | Mod: 95,,, | Performed by: FAMILY MEDICINE

## 2023-11-02 PROCEDURE — 99214 PR OFFICE/OUTPT VISIT, EST, LEVL IV, 30-39 MIN: ICD-10-PCS | Mod: 95,,, | Performed by: FAMILY MEDICINE

## 2023-11-02 NOTE — PROGRESS NOTES
The patient location is: home  The chief complaint leading to consultation is: htn    Visit type: audiovisual    Face to Face time with patient: 20  30 minutes of total time spent on the encounter, which includes face to face time and non-face to face time preparing to see the patient (eg, review of tests), Obtaining and/or reviewing separately obtained history, Documenting clinical information in the electronic or other health record, Independently interpreting results (not separately reported) and communicating results to the patient/family/caregiver, or Care coordination (not separately reported).         Each patient to whom he or she provides medical services by telemedicine is:  (1) informed of the relationship between the physician and patient and the respective role of any other health care provider with respect to management of the patient; and (2) notified that he or she may decline to receive medical services by telemedicine and may withdraw from such care at any time.    Notes:   Subjective:       Patient ID: Esme Louise is a 53 y.o. female.    Chief Complaint: Hypertension    Hypertension  This is a chronic problem. The current episode started more than 1 year ago. The problem has been waxing and waning since onset. The problem is controlled. Associated symptoms include anxiety, headaches and malaise/fatigue. Pertinent negatives include no chest pain, palpitations or shortness of breath. There are no associated agents to hypertension. Risk factors for coronary artery disease include dyslipidemia, family history, obesity, post-menopausal state and sedentary lifestyle. There are no compliance problems.      Pt is in virtual visit for follow up of htn pt is stable on b blocker helps with her anxiety as well no excessive fatigue no sob/cp bp fine at home  Pt as hypercholesterolemia she has not been on a statin she is not on a low fat low salt diet consistently  Pt has hypothyroid not on synthroid  "for a few weeks she restarted recently no temp intolerance   Review of Systems   Constitutional:  Positive for malaise/fatigue. Negative for chills, fatigue and fever.   Respiratory:  Negative for cough, chest tightness and shortness of breath.    Cardiovascular:  Negative for chest pain and palpitations.   Gastrointestinal:  Negative for abdominal distention, abdominal pain and blood in stool.   Neurological:  Positive for headaches.       Objective:    /83   Pulse 65   Ht 5' 6" (1.676 m)   Wt 72.6 kg (160 lb)   LMP 03/20/2018 (Approximate)   BMI 25.82 kg/m²     Physical Exam  Constitutional:       Appearance: Normal appearance. She is not ill-appearing.   HENT:      Head: Normocephalic and atraumatic.      Nose: Nose normal.   Eyes:      Extraocular Movements: Extraocular movements intact.   Pulmonary:      Effort: Pulmonary effort is normal. No respiratory distress.   Neurological:      General: No focal deficit present.      Mental Status: She is alert and oriented to person, place, and time.      Cranial Nerves: No cranial nerve deficit.      Coordination: Coordination normal.   Psychiatric:         Mood and Affect: Mood normal.         Behavior: Behavior normal.         Thought Content: Thought content normal.         Judgment: Judgment normal.       Tsh 21 in 10/2023  Assessment:       1. Essential hypertension    2. Mixed hyperlipidemia    3. Acquired hypothyroidism        Plan:     Orders tsh lipid  Cont meds  Low fat low salt diet  Graded exercise  Rtc 3 months       "This note will not be shared with the patient."   "

## 2023-11-09 ENCOUNTER — OFFICE VISIT (OUTPATIENT)
Dept: OBSTETRICS AND GYNECOLOGY | Facility: CLINIC | Age: 53
End: 2023-11-09
Attending: OBSTETRICS & GYNECOLOGY
Payer: COMMERCIAL

## 2023-11-09 VITALS
SYSTOLIC BLOOD PRESSURE: 132 MMHG | BODY MASS INDEX: 25.94 KG/M2 | WEIGHT: 160.69 LBS | DIASTOLIC BLOOD PRESSURE: 80 MMHG

## 2023-11-09 DIAGNOSIS — E28.319 PREMATURE MENOPAUSE: ICD-10-CM

## 2023-11-09 DIAGNOSIS — E03.9 ACQUIRED HYPOTHYROIDISM: Primary | ICD-10-CM

## 2023-11-09 DIAGNOSIS — Z01.419 ENCOUNTER FOR GYNECOLOGICAL EXAMINATION (GENERAL) (ROUTINE) WITHOUT ABNORMAL FINDINGS: ICD-10-CM

## 2023-11-09 PROCEDURE — 99999 PR PBB SHADOW E&M-EST. PATIENT-LVL IV: ICD-10-PCS | Mod: PBBFAC,,, | Performed by: OBSTETRICS & GYNECOLOGY

## 2023-11-09 PROCEDURE — 99396 PREV VISIT EST AGE 40-64: CPT | Mod: S$GLB,,, | Performed by: OBSTETRICS & GYNECOLOGY

## 2023-11-09 PROCEDURE — 99999 PR PBB SHADOW E&M-EST. PATIENT-LVL IV: CPT | Mod: PBBFAC,,, | Performed by: OBSTETRICS & GYNECOLOGY

## 2023-11-09 PROCEDURE — 99396 PR PREVENTIVE VISIT,EST,40-64: ICD-10-PCS | Mod: S$GLB,,, | Performed by: OBSTETRICS & GYNECOLOGY

## 2023-11-09 NOTE — PROGRESS NOTES
Subjective:       Patient ID: Esme Louise is a 53 y.o. female.    Chief Complaint:  Annual Exam        History of Present Illness  Esme Louise is a 53 y.o. female  who presents for annual. She has been on HRT, Estradiol 1mg and Prometrium 100 mg for some time and was doing ok with it. She recently feels that she is more tired and wondering if hormonal. Also some more hair loss. Was without Synthroid for about 3 weeks but felt like symptoms were before that. Also she was on Prozac 40 mg for years and then felt like it wasn't working. Tried other things and is now back to Prozac 20 mg. Does feel like it helps but wondering if the fatigue could be some depression. Discussed. Recommend speak with Psyhiatrist and consider increasing to 40 mg. Also she restarted her Synthroid about 2 weeks ago. Asking about increasing HRT dose but I don't want to change too many things at once (increasing Prozac, thyroid readjusting AND increase HRT.) Plan is to monitor symptoms and check TSH in a month. If normal and not feeling better consider increase to Estradiol 2 mg and Prometrium 200 mg. Pt agrees. Fibroid doing ok and not bothering her.     Patient's last menstrual period was 2018 (approximate).   Date of Last Pap: 2020    Review of Systems  Review of Systems   Constitutional:  Negative for chills and fever.        Objective:   Physical Exam:   Constitutional: She is oriented to person, place, and time. Vital signs are normal. She appears well-developed and well-nourished. No distress.        Pulmonary/Chest: She exhibits no mass. Right breast exhibits no mass, no nipple discharge, no skin change, no tenderness, no bleeding and no swelling. Left breast exhibits no mass, no nipple discharge, no skin change, no tenderness, no bleeding and no swelling. Breasts are symmetrical.        Abdominal: Soft. Bowel sounds are normal. She exhibits no distension and no mass. There is no abdominal tenderness.  There is no rebound.     Genitourinary:    Vagina normal.   There is no rash, tenderness, lesion or injury on the right labia. There is no rash, tenderness, lesion or injury on the left labia. Cervix is normal. Right adnexum displays no mass, no tenderness and no fullness. Left adnexum displays no mass, no tenderness and no fullness. No erythema,  no vaginal discharge, tenderness, rectocele, cystocele or unspecified prolapse of vaginal walls in the vagina. Cervix exhibits no motion tenderness, no discharge and no friability. Uterus is enlarged and hosting fibroids. Uterus is not deviated, not fixed (12) and not tender. Uterus size: 12 cm.          Musculoskeletal: Normal range of motion and moves all extremeties.      Lymphadenopathy:        Right: No supraclavicular adenopathy present.        Left: No supraclavicular adenopathy present.    Neurological: She is alert and oriented to person, place, and time.    Skin: Skin is warm and dry.    Psychiatric: She has a normal mood and affect. Her behavior is normal. Judgment normal.        Assessment/ Plan:     1. Acquired hypothyroidism  TSH      2. Encounter for gynecological examination (general) (routine) without abnormal findings        3. Premature menopause            No follow-ups on file.    As of April 1, 2021, the Cures Act has been passed nationally. This new law requires that all doctors progress notes, lab results, pathology reports and radiology reports be released IMMEDIATELY to the patient in the patient portal. That means that the results are released to you at the EXACT same time they are released to me. Therefore, with all of the patients that I have I am not able to reply to each patient exactly when the results come in. So there will be a delay from when you see the results to when I see them and have time to come up with a response to send you. Also I only see these results when I am on the computer at work. So if the results come in over the  weekend or after 5 pm of a work day, I will not see them until the next business day. As you can tell, this is a challenge as a physician to give every patient the quick response they hope for and deserve. So please be patient! Thanks for understanding, Dr. Strar

## 2023-12-31 DIAGNOSIS — E28.319 PREMATURE MENOPAUSE: ICD-10-CM

## 2024-01-03 RX ORDER — PROGESTERONE 100 MG/1
100 CAPSULE ORAL DAILY
Qty: 90 CAPSULE | Refills: 3 | Status: SHIPPED | OUTPATIENT
Start: 2024-01-03 | End: 2025-01-02

## 2024-01-03 RX ORDER — ESTRADIOL 1 MG/1
1 TABLET ORAL DAILY
Qty: 90 TABLET | Refills: 3 | Status: SHIPPED | OUTPATIENT
Start: 2024-01-03 | End: 2025-01-02

## 2024-02-02 RX ORDER — PROPRANOLOL HYDROCHLORIDE 20 MG/1
20 TABLET ORAL 2 TIMES DAILY
Qty: 180 TABLET | Refills: 2 | Status: SHIPPED | OUTPATIENT
Start: 2024-02-02

## 2024-02-02 NOTE — TELEPHONE ENCOUNTER
Refill Decision Note   Esme Aspen  is requesting a refill authorization.  Brief Assessment and Rationale for Refill:  Approve     Medication Therapy Plan:         Comments:     Note composed:12:12 PM 02/02/2024

## 2024-02-02 NOTE — TELEPHONE ENCOUNTER
No care due was identified.  [facility] Embedded Care Due Messages. Reference number: [id number].   2/02/2024 9:56:20 AM CST

## 2024-03-12 ENCOUNTER — TELEPHONE (OUTPATIENT)
Dept: FAMILY MEDICINE | Facility: CLINIC | Age: 54
End: 2024-03-12
Payer: COMMERCIAL

## 2024-03-12 NOTE — TELEPHONE ENCOUNTER
----- Message from Shelia Cowan MA sent at 3/12/2024  1:18 PM CDT -----  Name of Who is Calling: Rema luna [facility] calling on behalf of RUBEN MERA [[id number]]                What is the request in detail: They need clarification on ergocalciferol (ERGOCALCIFEROL) 50,000 unit Cap the directions. Please assist.                 Can the clinic reply by [facility]: No                What [facility] Back [facility]: [phone]

## 2024-07-22 ENCOUNTER — PATIENT OUTREACH (OUTPATIENT)
Dept: ADMINISTRATIVE | Facility: HOSPITAL | Age: 54
End: 2024-07-22
Payer: COMMERCIAL

## 2024-10-14 DIAGNOSIS — E03.9 HYPOTHYROIDISM, UNSPECIFIED TYPE: ICD-10-CM

## 2024-10-14 DIAGNOSIS — E78.2 MIXED HYPERLIPIDEMIA: ICD-10-CM

## 2024-10-15 NOTE — TELEPHONE ENCOUNTER
Care Due:                  Date            Visit Type   Department     Provider  --------------------------------------------------------------------------------                                ESTABLISHED                              PATIENT -    [facility]  Last Visit: 11-      [facility]      MEDICINE       Judy Bee                               -                              PRIMARY      [facility]  Next Visit: 10-      CARE ([facility])   MEDICINE       Judy Bee                                                            Last  Test          Frequency    Reason                     Performed    Due Date  --------------------------------------------------------------------------------    CBC.........  12 months..  fenofibrate..............  10-   10-    CMP.........  12 months..  ergocalciferol,            10-   10-                             fenofibrate, losartan,                             pravastatin..............    Lipid Panel.  12 months..  fenofibrate, pravastatin.  10-   10-    TSH.........  12 months..  levothyroxine............  10-   10-    Vitamin D...  12 months..  ergocalciferol...........  10-   10-    [facility] Embedded Care Due Messages. Reference number: [id number].   10/14/2024 8:02:27 PM CDT

## 2024-10-16 RX ORDER — LEVOTHYROXINE SODIUM 100 UG/1
100 TABLET ORAL DAILY
Qty: 90 TABLET | Refills: 3 | Status: SHIPPED | OUTPATIENT
Start: 2024-10-16

## 2024-10-16 RX ORDER — PRAVASTATIN SODIUM 40 MG/1
40 TABLET ORAL DAILY
Qty: 90 TABLET | Refills: 3 | Status: SHIPPED | OUTPATIENT
Start: 2024-10-16

## 2024-10-16 RX ORDER — FENOFIBRATE 160 MG/1
160 TABLET ORAL DAILY
Qty: 90 TABLET | Refills: 0 | Status: SHIPPED | OUTPATIENT
Start: 2024-10-16

## 2024-10-22 ENCOUNTER — OFFICE VISIT (OUTPATIENT)
Dept: FAMILY MEDICINE | Facility: CLINIC | Age: 54
End: 2024-10-22
Attending: FAMILY MEDICINE
Payer: COMMERCIAL

## 2024-10-22 ENCOUNTER — LAB VISIT (OUTPATIENT)
Dept: LAB | Facility: HOSPITAL | Age: 54
End: 2024-10-22
Attending: FAMILY MEDICINE
Payer: COMMERCIAL

## 2024-10-22 VITALS
HEART RATE: 84 BPM | OXYGEN SATURATION: 95 % | DIASTOLIC BLOOD PRESSURE: 88 MMHG | WEIGHT: 155 LBS | BODY MASS INDEX: 25.02 KG/M2 | SYSTOLIC BLOOD PRESSURE: 142 MMHG

## 2024-10-22 DIAGNOSIS — E78.2 MIXED HYPERLIPIDEMIA: ICD-10-CM

## 2024-10-22 DIAGNOSIS — Z00.00 ANNUAL PHYSICAL EXAM: ICD-10-CM

## 2024-10-22 DIAGNOSIS — Z00.00 ANNUAL PHYSICAL EXAM: Primary | ICD-10-CM

## 2024-10-22 DIAGNOSIS — I10 ESSENTIAL HYPERTENSION: ICD-10-CM

## 2024-10-22 DIAGNOSIS — K21.9 GASTROESOPHAGEAL REFLUX DISEASE, UNSPECIFIED WHETHER ESOPHAGITIS PRESENT: ICD-10-CM

## 2024-10-22 LAB
ALBUMIN SERPL BCP-MCNC: 3.9 G/DL (ref 3.5–5.2)
ALP SERPL-CCNC: 61 U/L (ref 40–150)
ALT SERPL W/O P-5'-P-CCNC: 27 U/L (ref 10–44)
ANION GAP SERPL CALC-SCNC: 10 MMOL/L (ref 8–16)
AST SERPL-CCNC: 28 U/L (ref 10–40)
BASOPHILS # BLD AUTO: 0.07 K/UL (ref 0–0.2)
BASOPHILS NFR BLD: 0.8 % (ref 0–1.9)
BILIRUB SERPL-MCNC: 0.4 MG/DL (ref 0.1–1)
BILIRUB UR QL STRIP: NEGATIVE
BUN SERPL-MCNC: 17 MG/DL (ref 6–20)
CALCIUM SERPL-MCNC: 9.3 MG/DL (ref 8.7–10.5)
CHLORIDE SERPL-SCNC: 108 MMOL/L (ref 95–110)
CHOLEST SERPL-MCNC: 183 MG/DL (ref 120–199)
CHOLEST/HDLC SERPL: 4.7 {RATIO} (ref 2–5)
CLARITY UR REFRACT.AUTO: CLEAR
CO2 SERPL-SCNC: 22 MMOL/L (ref 23–29)
COLOR UR AUTO: YELLOW
CREAT SERPL-MCNC: 0.8 MG/DL (ref 0.5–1.4)
DIFFERENTIAL METHOD BLD: ABNORMAL
EOSINOPHIL # BLD AUTO: 0.2 K/UL (ref 0–0.5)
EOSINOPHIL NFR BLD: 2.4 % (ref 0–8)
ERYTHROCYTE [DISTWIDTH] IN BLOOD BY AUTOMATED COUNT: 13.4 % (ref 11.5–14.5)
EST. GFR  (NO RACE VARIABLE): >60 ML/MIN/1.73 M^2
ESTIMATED AVG GLUCOSE: 103 MG/DL (ref 68–131)
FERRITIN SERPL-MCNC: 87 NG/ML (ref 20–300)
GLUCOSE SERPL-MCNC: 97 MG/DL (ref 70–110)
GLUCOSE UR QL STRIP: NEGATIVE
HBA1C MFR BLD: 5.2 % (ref 4–5.6)
HCT VFR BLD AUTO: 39.9 % (ref 37–48.5)
HDLC SERPL-MCNC: 39 MG/DL (ref 40–75)
HDLC SERPL: 21.3 % (ref 20–50)
HGB BLD-MCNC: 12.8 G/DL (ref 12–16)
HGB UR QL STRIP: NEGATIVE
IMM GRANULOCYTES # BLD AUTO: 0.02 K/UL (ref 0–0.04)
IMM GRANULOCYTES NFR BLD AUTO: 0.2 % (ref 0–0.5)
IRON SERPL-MCNC: 127 UG/DL (ref 30–160)
KETONES UR QL STRIP: NEGATIVE
LDLC SERPL CALC-MCNC: 89 MG/DL (ref 63–159)
LEUKOCYTE ESTERASE UR QL STRIP: NEGATIVE
LYMPHOCYTES # BLD AUTO: 3.3 K/UL (ref 1–4.8)
LYMPHOCYTES NFR BLD: 39 % (ref 18–48)
MCH RBC QN AUTO: 31.3 PG (ref 27–31)
MCHC RBC AUTO-ENTMCNC: 32.1 G/DL (ref 32–36)
MCV RBC AUTO: 98 FL (ref 82–98)
MONOCYTES # BLD AUTO: 0.8 K/UL (ref 0.3–1)
MONOCYTES NFR BLD: 9.7 % (ref 4–15)
NEUTROPHILS # BLD AUTO: 4 K/UL (ref 1.8–7.7)
NEUTROPHILS NFR BLD: 47.9 % (ref 38–73)
NITRITE UR QL STRIP: NEGATIVE
NONHDLC SERPL-MCNC: 144 MG/DL
NRBC BLD-RTO: 0 /100 WBC
PH UR STRIP: 7 [PH] (ref 5–8)
PLATELET # BLD AUTO: 481 K/UL (ref 150–450)
PMV BLD AUTO: 10.5 FL (ref 9.2–12.9)
POTASSIUM SERPL-SCNC: 3.9 MMOL/L (ref 3.5–5.1)
PROT SERPL-MCNC: 7.1 G/DL (ref 6–8.4)
PROT UR QL STRIP: NEGATIVE
RBC # BLD AUTO: 4.09 M/UL (ref 4–5.4)
SATURATED IRON: 25 % (ref 20–50)
SODIUM SERPL-SCNC: 140 MMOL/L (ref 136–145)
SP GR UR STRIP: 1.02 (ref 1–1.03)
T4 FREE SERPL-MCNC: 1.16 NG/DL (ref 0.71–1.51)
TOTAL IRON BINDING CAPACITY: 509 UG/DL (ref 250–450)
TRANSFERRIN SERPL-MCNC: 344 MG/DL (ref 200–375)
TRIGL SERPL-MCNC: 275 MG/DL (ref 30–150)
TSH SERPL DL<=0.005 MIU/L-ACNC: 8.17 UIU/ML (ref 0.4–4)
URN SPEC COLLECT METH UR: NORMAL
WBC # BLD AUTO: 8.35 K/UL (ref 3.9–12.7)

## 2024-10-22 PROCEDURE — 84466 ASSAY OF TRANSFERRIN: CPT | Performed by: FAMILY MEDICINE

## 2024-10-22 PROCEDURE — 36415 COLL VENOUS BLD VENIPUNCTURE: CPT | Mod: PO | Performed by: FAMILY MEDICINE

## 2024-10-22 PROCEDURE — 83036 HEMOGLOBIN GLYCOSYLATED A1C: CPT | Performed by: FAMILY MEDICINE

## 2024-10-22 PROCEDURE — 99396 PREV VISIT EST AGE 40-64: CPT | Mod: S$GLB,,, | Performed by: FAMILY MEDICINE

## 2024-10-22 PROCEDURE — 80061 LIPID PANEL: CPT | Performed by: FAMILY MEDICINE

## 2024-10-22 PROCEDURE — 84439 ASSAY OF FREE THYROXINE: CPT | Performed by: FAMILY MEDICINE

## 2024-10-22 PROCEDURE — 85025 COMPLETE CBC W/AUTO DIFF WBC: CPT | Performed by: FAMILY MEDICINE

## 2024-10-22 PROCEDURE — 82728 ASSAY OF FERRITIN: CPT | Performed by: FAMILY MEDICINE

## 2024-10-22 PROCEDURE — 84443 ASSAY THYROID STIM HORMONE: CPT | Performed by: FAMILY MEDICINE

## 2024-10-22 PROCEDURE — 99999 PR PBB SHADOW E&M-EST. PATIENT-LVL V: CPT | Mod: PBBFAC,,, | Performed by: FAMILY MEDICINE

## 2024-10-22 PROCEDURE — 81003 URINALYSIS AUTO W/O SCOPE: CPT | Performed by: FAMILY MEDICINE

## 2024-10-22 PROCEDURE — 80053 COMPREHEN METABOLIC PANEL: CPT | Performed by: FAMILY MEDICINE

## 2024-10-22 RX ORDER — LOSARTAN POTASSIUM 100 MG/1
100 TABLET ORAL DAILY
Qty: 90 TABLET | Refills: 0 | Status: SHIPPED | OUTPATIENT
Start: 2024-10-22

## 2024-10-22 RX ORDER — ERGOCALCIFEROL 1.25 MG/1
50000 CAPSULE ORAL
COMMUNITY
Start: 2024-09-09

## 2024-10-22 RX ORDER — PANTOPRAZOLE SODIUM 20 MG/1
20 TABLET, DELAYED RELEASE ORAL DAILY
Qty: 90 TABLET | Refills: 3 | Status: SHIPPED | OUTPATIENT
Start: 2024-10-22

## 2024-10-22 RX ORDER — FERROUS SULFATE TAB 325 MG (65 MG ELEMENTAL FE) 325 (65 FE) MG
325 TAB ORAL
COMMUNITY
Start: 2024-05-06

## 2024-10-22 NOTE — PROGRESS NOTES
Subjective:       Patient ID: Esme Louise is a 54 y.o. female.    Chief Complaint: Annual Exam    HPI  Pt is here for annual exam pt is well no sob/cp cough chest congestion sore throat uri symptoms  Pt denies dysuria hematuria no vaginal bleeding  Pt denies n/v/f/c/d/c no change in bowel habits no brbpr  Pt has hypercholesterolemia on statin no muscle aches  Pt has htn bp elevated intermittently on b blocker and on amid dose arb no excessive fatigue  Pt has gerd no break through heart burn when on ppi consistently  Review of Systems   Constitutional:  Negative for activity change, chills, diaphoresis, fatigue and fever.   HENT:  Negative for congestion, ear discharge, ear pain, hearing loss, postnasal drip, rhinorrhea, sinus pressure, sneezing, sore throat, trouble swallowing and voice change.    Eyes:  Negative for photophobia, discharge, redness, itching and visual disturbance.   Respiratory:  Negative for cough, chest tightness, shortness of breath and wheezing.    Cardiovascular:  Negative for chest pain, palpitations and leg swelling.   Gastrointestinal:  Negative for abdominal pain, anal bleeding, blood in stool, constipation, diarrhea, nausea, rectal pain and vomiting.   Genitourinary:  Negative for dyspareunia, dysuria, flank pain, frequency, hematuria, menstrual problem, pelvic pain, urgency, vaginal bleeding and vaginal discharge.   Musculoskeletal:  Negative for arthralgias, back pain, joint swelling and neck pain.   Skin:  Negative for color change and rash.   Neurological:  Negative for dizziness, speech difficulty, weakness, light-headedness, numbness and headaches.   Hematological:  Does not bruise/bleed easily.   Psychiatric/Behavioral:  Negative for agitation, confusion, decreased concentration, sleep disturbance and suicidal ideas. The patient is not nervous/anxious.        Objective:    BP (!) 142/88   Pulse 84   Wt 70.3 kg (155 lb)   LMP 03/20/2018 (Approximate)   SpO2 95%   BMI  25.02 kg/m²     Physical Exam  Constitutional:       Appearance: Normal appearance. She is well-developed. She is not ill-appearing.   HENT:      Head: Normocephalic and atraumatic.      Right Ear: Tympanic membrane and external ear normal.      Left Ear: Tympanic membrane and external ear normal.      Nose: Nose normal.   Eyes:      General:         Right eye: No discharge.         Left eye: No discharge.      Conjunctiva/sclera: Conjunctivae normal.      Pupils: Pupils are equal, round, and reactive to light.   Neck:      Thyroid: No thyromegaly.   Cardiovascular:      Rate and Rhythm: Normal rate and regular rhythm.      Heart sounds: Normal heart sounds. No murmur heard.     No friction rub. No gallop.   Pulmonary:      Effort: Pulmonary effort is normal.      Breath sounds: Normal breath sounds. No wheezing or rales.   Abdominal:      General: Bowel sounds are normal. There is no distension.      Palpations: Abdomen is soft.      Tenderness: There is no abdominal tenderness. There is no guarding or rebound.   Genitourinary:     Vagina: Normal.      Comments: declined  Musculoskeletal:         General: No tenderness. Normal range of motion.      Cervical back: Normal range of motion and neck supple.   Lymphadenopathy:      Cervical: No cervical adenopathy.   Skin:     General: Skin is warm and dry.      Findings: No erythema or rash.   Neurological:      General: No focal deficit present.      Mental Status: She is alert and oriented to person, place, and time.      Cranial Nerves: No cranial nerve deficit.      Motor: No abnormal muscle tone.      Coordination: Coordination normal.   Psychiatric:         Behavior: Behavior normal.         Thought Content: Thought content normal.         Judgment: Judgment normal.         Assessment:       1. Annual physical exam    2. Mixed hyperlipidemia    3. Essential hypertension    4. Gastroesophageal reflux disease        Plan:     Orders cmp cbc lipid tsh urine hgb  "A1c  Cont meds  Low fat diet  Graded exercise  Rtc 2-3 weeks bp log and 6 months        "This note will not be shared with the patient."     "

## 2024-11-08 ENCOUNTER — OFFICE VISIT (OUTPATIENT)
Dept: FAMILY MEDICINE | Facility: CLINIC | Age: 54
End: 2024-11-08
Attending: FAMILY MEDICINE
Payer: COMMERCIAL

## 2024-11-08 DIAGNOSIS — E78.2 MIXED HYPERLIPIDEMIA: ICD-10-CM

## 2024-11-08 DIAGNOSIS — I10 ESSENTIAL HYPERTENSION: Primary | ICD-10-CM

## 2024-11-08 PROCEDURE — 99214 OFFICE O/P EST MOD 30 MIN: CPT | Mod: 95,,, | Performed by: FAMILY MEDICINE

## 2024-11-08 RX ORDER — VALSARTAN 160 MG/1
160 TABLET ORAL DAILY
Qty: 90 TABLET | Refills: 3 | Status: SHIPPED | OUTPATIENT
Start: 2024-11-08 | End: 2024-12-02

## 2024-11-13 ENCOUNTER — PATIENT MESSAGE (OUTPATIENT)
Dept: ADMINISTRATIVE | Facility: HOSPITAL | Age: 54
End: 2024-11-13
Payer: COMMERCIAL

## 2024-11-25 ENCOUNTER — PATIENT MESSAGE (OUTPATIENT)
Dept: FAMILY MEDICINE | Facility: CLINIC | Age: 54
End: 2024-11-25
Payer: COMMERCIAL

## 2024-12-02 VITALS
DIASTOLIC BLOOD PRESSURE: 90 MMHG | HEIGHT: 66 IN | BODY MASS INDEX: 24.8 KG/M2 | HEART RATE: 85 BPM | WEIGHT: 154.31 LBS | SYSTOLIC BLOOD PRESSURE: 140 MMHG

## 2024-12-02 RX ORDER — LOSARTAN POTASSIUM 100 MG/1
100 TABLET ORAL DAILY
Qty: 90 TABLET | Refills: 3 | Status: SHIPPED | OUTPATIENT
Start: 2024-12-02 | End: 2025-12-02

## 2024-12-02 NOTE — PROGRESS NOTES
The patient location is: home  The chief complaint leading to consultation is: htn    Visit type: audiovisual    Face to Face time with patient: 20  30 minutes of total time spent on the encounter, which includes face to face time and non-face to face time preparing to see the patient (eg, review of tests), Obtaining and/or reviewing separately obtained history, Documenting clinical information in the electronic or other health record, Independently interpreting results (not separately reported) and communicating results to the patient/family/caregiver, or Care coordination (not separately reported).         Each patient to whom he or she provides medical services by telemedicine is:  (1) informed of the relationship between the physician and patient and the respective role of any other health care provider with respect to management of the patient; and (2) notified that he or she may decline to receive medical services by telemedicine and may withdraw from such care at any time.    Notes:   Subjective:       Patient ID: Esme Louise is a 54 y.o. female.    Chief Complaint: Hypertension    Hypertension  Associated symptoms include headaches. Pertinent negatives include no chest pain, neck pain, palpitations or shortness of breath.     Pt is here for follow up of htn stable on losartan b blocker no excessive fatigue  Pt would like to change medications as her bp has been elevated recently no sob/cp  Pt has hypercholesterolemia on statin no muscle aches ldl improved    Review of Systems   Constitutional:  Negative for activity change, chills, fatigue, fever and unexpected weight change.   HENT:  Negative for hearing loss, rhinorrhea and trouble swallowing.    Eyes:  Negative for discharge and visual disturbance.   Respiratory:  Negative for cough, chest tightness, shortness of breath and wheezing.    Cardiovascular:  Negative for chest pain and palpitations.   Gastrointestinal:  Negative for blood in stool,  "constipation, diarrhea and vomiting.   Endocrine: Negative for polydipsia and polyuria.   Genitourinary:  Negative for difficulty urinating, dysuria, hematuria and menstrual problem.   Musculoskeletal:  Positive for arthralgias and joint swelling. Negative for neck pain.   Neurological:  Positive for headaches. Negative for weakness.   Psychiatric/Behavioral:  Negative for confusion and dysphoric mood.        Objective:      BP (!) 140/90   Pulse 85   Ht 5' 6" (1.676 m)   Wt 70 kg (154 lb 5.2 oz)   LMP 03/20/2018 (Approximate)   BMI 24.91 kg/m²     Physical Exam  Constitutional:       Appearance: Normal appearance. She is not ill-appearing.   Pulmonary:      Effort: Pulmonary effort is normal. No respiratory distress.   Neurological:      General: No focal deficit present.      Mental Status: She is alert and oriented to person, place, and time.      Cranial Nerves: No cranial nerve deficit.      Coordination: Coordination normal.   Psychiatric:         Mood and Affect: Mood normal.         Behavior: Behavior normal.         Thought Content: Thought content normal.         Judgment: Judgment normal.       Ldl 89 in 10/2024  Assessment:       1. Essential hypertension    2. Mixed hyperlipidemia        Plan:     Orders cmp lipid  Cont meds  D/c losartan  Start diovan  Low salt diet  Graded exercise  Rtc 2 weeks bp log and 6 months        "This note will not be shared with the patient."   "

## 2024-12-10 DIAGNOSIS — M85.80 OTHER SPECIFIED DISORDERS OF BONE DENSITY AND STRUCTURE, UNSPECIFIED SITE: ICD-10-CM

## 2024-12-10 NOTE — TELEPHONE ENCOUNTER
No care due was identified.  [facility] Embedded Care Due Messages. Reference number: [id number].   12/10/2024 8:05:05 AM CST

## 2024-12-10 NOTE — TELEPHONE ENCOUNTER
Refill Encounter    PCP Visits: Recent Visits  Date Type Provider Dept   11/08/24 Office Visit Judy Bee MD [facility]   10/22/24 Office Visit Judy Bee MD [facility]   Showing recent visits within past 360 days and meeting all other requirements  Future Appointments  No visits were found meeting these conditions.  Showing future appointments within next 720 days and meeting all other requirements     Last 3 Blood Pressure:   BP Readings from Last 3 Encounters:   12/02/24 (!) 140/90   10/22/24 (!) 142/88   11/09/23 132/80     Preferred Pharmacy:   [address] [address]  Phone: [phone] Fax: [phone]    Requested RX:  Requested Prescriptions     Pending Prescriptions Disp Refills    ergocalciferol (ERGOCALCIFEROL) 50,000 unit Cap [Pharmacy Med Name: ergocalciferol (vitamin D2) 1,250 mcg (50,000 unit) capsule] 12 capsule 3     Sig: TAKE ONE capsule (50,000 UNITS total) by MOUTH ONCE a WEEK      RX Route: Normal

## 2024-12-11 ENCOUNTER — PATIENT MESSAGE (OUTPATIENT)
Dept: ADMINISTRATIVE | Facility: HOSPITAL | Age: 54
End: 2024-12-11
Payer: COMMERCIAL

## 2024-12-12 RX ORDER — ERGOCALCIFEROL 1.25 MG/1
CAPSULE ORAL
Qty: 12 CAPSULE | Refills: 3 | Status: SHIPPED | OUTPATIENT
Start: 2024-12-12

## 2025-01-09 DIAGNOSIS — E28.319 PREMATURE MENOPAUSE: ICD-10-CM

## 2025-01-09 RX ORDER — PROGESTERONE 100 MG/1
100 CAPSULE ORAL DAILY
Qty: 90 CAPSULE | Refills: 0 | Status: SHIPPED | OUTPATIENT
Start: 2025-01-09

## 2025-01-10 RX ORDER — ESTRADIOL 1 MG/1
1 TABLET ORAL DAILY
Qty: 90 TABLET | Refills: 1 | Status: SHIPPED | OUTPATIENT
Start: 2025-01-10 | End: 2026-01-10

## 2025-01-10 NOTE — TELEPHONE ENCOUNTER
Refill Decision Note   Esme Aspen  is requesting a refill authorization.  Brief Assessment and Rationale for Refill:  Defer     Medication Therapy Plan: Drug-Disease: progesterone and Hepatic steatosis      Pharmacist review requested: Yes   Comments:     Note composed:7:52 PM 01/09/2025

## 2025-01-10 NOTE — TELEPHONE ENCOUNTER
Refill Routing Note   Medication(s) are not appropriate for processing by [facility] for the following reason(s):        Drug-disease interaction    [facility] action(s):  Defer        Medication Therapy Plan: Drug-Disease: progesterone and Hepatic steatosis    Pharmacist review requested: Yes     Appointments  past 12m or future 3m with PCP    Date Provider   Last Visit   11/9/2023 Haylie Starr MD   Next Visit   5/7/2025 Haylie Starr MD   ED visits in past 90 days: 0        Note composed:7:44 PM 01/09/2025

## 2025-01-10 NOTE — TELEPHONE ENCOUNTER
Refill Routing Note   Medication(s) are not appropriate for processing by [facility] for the following reason(s):        Required labs outdated  Required vitals abnormal    [facility] action(s):  Defer               Appointments  past 12m or future 3m with PCP    Date Provider   Last Visit   11/9/2023 Haylie Starr MD   Next Visit   1/9/2025 Haylie Starr MD   ED visits in past 90 days: 0        Note composed:7:07 PM 01/09/2025

## 2025-01-14 DIAGNOSIS — E78.2 MIXED HYPERLIPIDEMIA: ICD-10-CM

## 2025-01-14 RX ORDER — FENOFIBRATE 160 MG/1
160 TABLET ORAL DAILY
Qty: 90 TABLET | Refills: 3 | Status: SHIPPED | OUTPATIENT
Start: 2025-01-14

## 2025-01-14 NOTE — TELEPHONE ENCOUNTER
Care Due:                  Date            Visit Type   Department     Provider  --------------------------------------------------------------------------------                                ESTABLISHED                              PATIENT -    [facility]  Last Visit: 11-      [facility]      MEDICINE       Judy Bee  Next Visit: None Scheduled  None         None Found                                                            Last  Test          Frequency    Reason                     Performed    Due Date  --------------------------------------------------------------------------------    Vitamin D...  12 months..  ergocalciferol...........  Not Found    Overdue    [employer] Embedded Care Due Messages. Reference number: [id number].   1/14/2025 9:16:31 AM CST

## 2025-01-14 NOTE — TELEPHONE ENCOUNTER
Refill Routing Note   Medication(s) are not appropriate for processing by [facility] for the following reason(s):        Drug-disease interaction    [facility] action(s):  Defer      Medication Therapy Plan: Drug-Disease: fenofibrate and Hepatic steatosis    Pharmacist review requested: Yes     Appointments  past 12m or future 3m with PCP    Date Provider   Last Visit   11/8/2024 Judy Bee MD   Next Visit   Visit date not found Judy Bee MD   ED visits in past 90 days: 0        Note composed:10:36 AM 01/14/2025

## 2025-01-14 NOTE — TELEPHONE ENCOUNTER
Esme Louise  is requesting a refill authorization.  Brief Assessment and Rationale for Refill:  Approve     Medication Therapy Plan:         Pharmacist review requested: Yes   Extended chart review required: Yes   Comments:     Note composed:11:35 AM 01/14/2025

## 2025-02-17 ENCOUNTER — OFFICE VISIT (OUTPATIENT)
Dept: FAMILY MEDICINE | Facility: CLINIC | Age: 55
End: 2025-02-17
Payer: COMMERCIAL

## 2025-02-17 ENCOUNTER — LAB VISIT (OUTPATIENT)
Dept: LAB | Facility: HOSPITAL | Age: 55
End: 2025-02-17
Attending: NURSE PRACTITIONER
Payer: COMMERCIAL

## 2025-02-17 VITALS
HEIGHT: 66 IN | WEIGHT: 155.5 LBS | BODY MASS INDEX: 24.99 KG/M2 | SYSTOLIC BLOOD PRESSURE: 128 MMHG | HEART RATE: 77 BPM | OXYGEN SATURATION: 97 % | DIASTOLIC BLOOD PRESSURE: 83 MMHG

## 2025-02-17 DIAGNOSIS — E03.9 HYPOTHYROIDISM, UNSPECIFIED TYPE: ICD-10-CM

## 2025-02-17 DIAGNOSIS — L71.0 PERIORIFICIAL DERMATITIS: ICD-10-CM

## 2025-02-17 DIAGNOSIS — G43.909 MIGRAINE WITHOUT STATUS MIGRAINOSUS, NOT INTRACTABLE, UNSPECIFIED MIGRAINE TYPE: Primary | ICD-10-CM

## 2025-02-17 DIAGNOSIS — G24.3 CERVICAL DYSTONIA: ICD-10-CM

## 2025-02-17 LAB
25(OH)D3+25(OH)D2 SERPL-MCNC: 17 NG/ML (ref 30–96)
TSH SERPL DL<=0.005 MIU/L-ACNC: 3.24 UIU/ML (ref 0.4–4)
VIT B12 SERPL-MCNC: 898 PG/ML (ref 210–950)

## 2025-02-17 PROCEDURE — 82607 VITAMIN B-12: CPT | Performed by: NURSE PRACTITIONER

## 2025-02-17 PROCEDURE — 36415 COLL VENOUS BLD VENIPUNCTURE: CPT | Mod: PO | Performed by: NURSE PRACTITIONER

## 2025-02-17 PROCEDURE — 84443 ASSAY THYROID STIM HORMONE: CPT | Performed by: NURSE PRACTITIONER

## 2025-02-17 PROCEDURE — 82306 VITAMIN D 25 HYDROXY: CPT | Performed by: NURSE PRACTITIONER

## 2025-02-17 RX ORDER — UBROGEPANT 50 MG/1
50 TABLET ORAL
Qty: 30 TABLET | Refills: 1 | Status: SHIPPED | OUTPATIENT
Start: 2025-02-17

## 2025-02-17 RX ORDER — CYCLOBENZAPRINE HYDROCHLORIDE 15 MG/1
15 CAPSULE, EXTENDED RELEASE ORAL DAILY PRN
Qty: 30 CAPSULE | Refills: 3 | Status: SHIPPED | OUTPATIENT
Start: 2025-02-17 | End: 2025-02-20 | Stop reason: SDUPTHER

## 2025-02-17 RX ORDER — METRONIDAZOLE 7.5 MG/G
CREAM TOPICAL 2 TIMES DAILY
Qty: 45 G | Refills: 1 | Status: SHIPPED | OUTPATIENT
Start: 2025-02-17 | End: 2026-02-17

## 2025-02-18 ENCOUNTER — PATIENT MESSAGE (OUTPATIENT)
Dept: FAMILY MEDICINE | Facility: CLINIC | Age: 55
End: 2025-02-18
Payer: COMMERCIAL

## 2025-02-18 ENCOUNTER — RESULTS FOLLOW-UP (OUTPATIENT)
Dept: FAMILY MEDICINE | Facility: CLINIC | Age: 55
End: 2025-02-18

## 2025-02-18 DIAGNOSIS — G43.909 MIGRAINE WITHOUT STATUS MIGRAINOSUS, NOT INTRACTABLE, UNSPECIFIED MIGRAINE TYPE: ICD-10-CM

## 2025-02-18 DIAGNOSIS — G24.3 CERVICAL DYSTONIA: ICD-10-CM

## 2025-02-18 NOTE — PROGRESS NOTES
"Subjective:       Patient ID: Esme Louise is a 54 y.o. female.    Chief Complaint: Migraine    Migraine   This is a chronic problem. The current episode started more than 1 year ago. The problem occurs intermittently. The problem has been waxing and waning. The pain is located in the Right unilateral, temporal and parietal region. The pain does not radiate. The pain quality is similar to prior headaches. The pain is moderate. She has tried triptans for the symptoms. The treatment provided significant relief. Her past medical history is significant for migraine headaches.   Ms. Louise is requesting refills for abortive therapy for her migraines. She sees an external neurologist and would like to transfer to [facility].     Problem List[1]    Current Medications[2]    The following portions of the patient's history were reviewed and updated as appropriate: allergies, past family history, past medical history, past social history and past surgical history.    Review of Systems   HENT:  Negative for sneezing and trouble swallowing.    Respiratory:  Negative for shortness of breath and wheezing.        Objective:      /83 (BP Location: Left arm, Patient Position: Sitting)   Pulse 77   Ht 5' 6" (1.676 m)   Wt 70.5 kg (155 lb 8 oz)   LMP 03/20/2018 (Approximate)   SpO2 97%   BMI 25.10 kg/m²     Physical Exam  Constitutional:       General: She is not in acute distress.     Appearance: Normal appearance.   Cardiovascular:      Rate and Rhythm: Regular rhythm.      Pulses: Normal pulses.      Heart sounds: Normal heart sounds.   Pulmonary:      Effort: Pulmonary effort is normal.      Breath sounds: Normal breath sounds.   Musculoskeletal:         General: Normal range of motion.   Skin:     General: Skin is warm and dry.   Neurological:      Mental Status: She is alert and oriented to person, place, and time.   Psychiatric:         Mood and Affect: Mood normal.         Behavior: Behavior normal. "         Assessment:       1. Migraine without status migrainosus, not intractable, unspecified migraine type    2. Cervical dystonia    3. Periorificial dermatitis    4. Hypothyroidism, unspecified type        Plan:   Esme was seen today for migraine.    Diagnoses and all orders for this visit:    Migraine without status migrainosus, not intractable, unspecified migraine type  -     cyclobenzaprine (AMRIX) 15 MG 24 hr capsule; Take 1 capsule (15 mg total) by mouth daily as needed for Muscle spasms.  -     ubrogepant (UBRELVY) 50 mg tablet; Take 1 tablet (50 mg total) by mouth as needed for Migraine. If symptoms persist or return, may repeat dose after 2 hours. Maximum: 200 mg per 24 hours  -     Ambulatory referral/consult to Neurology; Future    Cervical dystonia  -     cyclobenzaprine (AMRIX) 15 MG 24 hr capsule; Take 1 capsule (15 mg total) by mouth daily as needed for Muscle spasms.  -     ubrogepant (UBRELVY) 50 mg tablet; Take 1 tablet (50 mg total) by mouth as needed for Migraine. If symptoms persist or return, may repeat dose after 2 hours. Maximum: 200 mg per 24 hours  -     Ambulatory referral/consult to Neurology; Future    Periorificial dermatitis  -     metronidazole 0.75% (METROCREAM) 0.75 % Crea; Apply topically 2 (two) times daily.    Hypothyroidism, unspecified type  -     Vitamin D; Future  -     TSH; Future  -     Vitamin B12; Future      Refills provided to hold her over until established with [facility].          [1]   Patient Active Problem List  Diagnosis    Pain of cervical facet joint    Facet arthropathy, cervical    Occipital neuralgia    Endometriosis    Premature ovarian failure    Essential hypertension    Hypothyroidism    Migraine without status migrainosus, not intractable    Mixed hyperlipidemia    Rheumatoid arthritis    Osteopenia    GERD (gastroesophageal reflux disease)    PTSD (post-traumatic stress disorder)    Depression    Fatigue    Gastritis without bleeding     Colon polyp    Overweight with body mass index (BMI) 25.0-29.9    Vitamin D deficiency    Mixed anxiety and depressive disorder    Decreased range of motion of neck    Neck muscle weakness    Poor posture    Weakness of both upper extremities    Hepatic steatosis    MIRYAM (iron deficiency anemia)    LYSSA (obstructive sleep apnea)   [2]   Current Outpatient Medications:     butalbital-acetaminophen-caff -40 mg Cap, TK ONE C PO Q 4 HOURS, Disp: , Rfl: 0    clonazePAM (KLONOPIN) 1 MG tablet, , Disp: , Rfl:     diclofenac (VOLTAREN) 50 MG EC tablet, TK 1 T PO BID, Disp: , Rfl: 0    ergocalciferol (ERGOCALCIFEROL) 50,000 unit Cap, TAKE ONE capsule (50,000 UNITS total) by MOUTH ONCE a WEEK, Disp: 12 capsule, Rfl: 3    estradioL (ESTRACE) 1 MG tablet, Take 1 tablet (1 mg total) by mouth once daily., Disp: 90 tablet, Rfl: 1    fenofibrate 160 MG Tab, Take 1 tablet (160 mg total) by mouth once daily., Disp: 90 tablet, Rfl: 3    FEROSUL 325 mg (65 mg iron) Tab tablet, Take 325 mg by mouth., Disp: , Rfl:     levomefolate/B12/herb 236 (RHEUMATE ORAL), Take 1 tablet by mouth Daily. stopped, Disp: , Rfl:     levothyroxine (SYNTHROID) 100 MCG tablet, Take 1 tablet (100 mcg total) by mouth once daily., Disp: 90 tablet, Rfl: 3    losartan (COZAAR) 100 MG tablet, Take 1 tablet (100 mg total) by mouth once daily., Disp: 90 tablet, Rfl: 3    methotrexate 2.5 MG Tab, Take by mouth every 7 days., Disp: , Rfl:     NUVIGIL 250 mg tablet, TK 1 T PO QD, Disp: , Rfl: 5    omega-3 fatty acids/fish oil (FISH OIL-OMEGA-3 FATTY ACIDS) 300-1,000 mg capsule, Take by mouth once daily., Disp: , Rfl:     ondansetron (ZOFRAN-ODT) 4 MG TbDL, Take 1 tablet (4 mg total) by mouth every 6 (six) hours as needed (nausea)., Disp: 12 tablet, Rfl: 0    pantoprazole (PROTONIX) 20 MG tablet, Take 1 tablet (20 mg total) by mouth once daily., Disp: 90 tablet, Rfl: 3    pravastatin (PRAVACHOL) 40 MG tablet, Take 1 tablet (40 mg total) by mouth once daily., Disp:  90 tablet, Rfl: 3    progesterone (PROMETRIUM) 100 MG capsule, Take 1 capsule (100 mg total) by mouth once daily., Disp: 90 capsule, Rfl: 0    propranoloL (INDERAL) 20 MG tablet, Take 1 tablet (20 mg total) by mouth 2 (two) times daily., Disp: 180 tablet, Rfl: 2    scopolamine (TRANSDERM-SCOP) 1.3-1.5 mg (1 mg over 3 days), Place 1 patch onto the skin every 72 hours., Disp: 4 patch, Rfl: 0    sodium chloride 0.9% SolP 100 mL with abatacept (with maltose) 250 mg SolR, abatacept  750MG 1X MONTH, Disp: , Rfl:     cyclobenzaprine (AMRIX) 15 MG 24 hr capsule, Take 1 capsule (15 mg total) by mouth daily as needed for Muscle spasms., Disp: 30 capsule, Rfl: 3    metronidazole 0.75% (METROCREAM) 0.75 % Crea, Apply topically 2 (two) times daily., Disp: 45 g, Rfl: 1    ubrogepant (UBRELVY) 50 mg tablet, Take 1 tablet (50 mg total) by mouth as needed for Migraine. If symptoms persist or return, may repeat dose after 2 hours. Maximum: 200 mg per 24 hours, Disp: 30 tablet, Rfl: 1

## 2025-02-20 RX ORDER — CYCLOBENZAPRINE HYDROCHLORIDE 15 MG/1
15 CAPSULE, EXTENDED RELEASE ORAL DAILY PRN
Qty: 60 CAPSULE | Refills: 3 | Status: SHIPPED | OUTPATIENT
Start: 2025-02-20

## 2025-03-03 ENCOUNTER — OFFICE VISIT (OUTPATIENT)
Dept: NEUROLOGY | Facility: CLINIC | Age: 55
End: 2025-03-03
Payer: COMMERCIAL

## 2025-03-03 DIAGNOSIS — G44.86 CERVICOGENIC HEADACHE: Primary | ICD-10-CM

## 2025-03-03 DIAGNOSIS — G43.019 INTRACTABLE MIGRAINE WITHOUT AURA AND WITHOUT STATUS MIGRAINOSUS: ICD-10-CM

## 2025-03-03 DIAGNOSIS — G24.3 CERVICAL DYSTONIA: ICD-10-CM

## 2025-03-03 PROCEDURE — 1159F MED LIST DOCD IN RCRD: CPT | Mod: CPTII,95,, | Performed by: STUDENT IN AN ORGANIZED HEALTH CARE EDUCATION/TRAINING PROGRAM

## 2025-03-03 PROCEDURE — 4010F ACE/ARB THERAPY RXD/TAKEN: CPT | Mod: CPTII,95,, | Performed by: STUDENT IN AN ORGANIZED HEALTH CARE EDUCATION/TRAINING PROGRAM

## 2025-03-03 PROCEDURE — 1160F RVW MEDS BY RX/DR IN RCRD: CPT | Mod: CPTII,95,, | Performed by: STUDENT IN AN ORGANIZED HEALTH CARE EDUCATION/TRAINING PROGRAM

## 2025-03-03 PROCEDURE — 98002 SYNCH AUDIO-VIDEO NEW MOD 45: CPT | Mod: 95,,, | Performed by: STUDENT IN AN ORGANIZED HEALTH CARE EDUCATION/TRAINING PROGRAM

## 2025-03-03 RX ORDER — UBROGEPANT 100 MG/1
100 TABLET ORAL
Qty: 10 TABLET | Refills: 11 | Status: SHIPPED | OUTPATIENT
Start: 2025-03-03

## 2025-03-03 RX ORDER — BUTALBITAL, ACETAMINOPHEN AND CAFFEINE 50; 325; 40 MG/1; MG/1; MG/1
1 TABLET ORAL EVERY 6 HOURS PRN
Qty: 15 TABLET | Refills: 3 | Status: SHIPPED | OUTPATIENT
Start: 2025-03-03

## 2025-03-03 NOTE — PROGRESS NOTES
Patient ID: [id number]  The patient location is: [facility]   The chief complaint leading to consultation is: Headaches    Visit type: audiovisual    Face to Face time with patient: 35      Each patient to whom he or she provides medical services by telemedicine is:  (1) informed of the relationship between the physician and patient and the respective role of any other health care provider with respect to management of the patient; and (2) notified that he or she may decline to receive medical services by telemedicine and may withdraw from such care at any time.    Notes:     Subjective:     HPI:  Esme Louise is a 54 y.o. female with RA, HTN, hyperlipidemia, hypothyroidism, LYSSA, cervical spondylosis, and migraines. she is presenting today as a new patient for evaluation of headaches.     Headache history  Age at onset: childhood  Course over time: became constant in 2012  Location: unilateral temple and retro-orbital vs occipital and neck pain for moderate headaches  Character:  sharp/electric/pressure/squeezing behind the eye and sinuses  Intensity: 5-7 on a scale from 1 to 10. Used to have 8-10 intensity.  Frequency:  x1-2 MMGDs + x4-8 MHDs .   Duration:  12-48  hours  Timing: do not seem to be related to any time of the day   Mild/moderate/severe. Work attendance or other daily activities are affected by the headaches.  Aura:  prodrome of enhanced sensation of smell and hypersensitivity to odors and light  Associated symptoms: N/V, Photosensitivity, and Phonophobia   Associated neurologic symptoms: dizziness and speech difficulties (slowed), slowed thought process   Precipitating factors: Weather changes, Menstrual periods, Stress, and Alcohol, more recently heavy lifting   Home treatment: PRN tizanidine and ice pack for occipital/neck pain. Ubrelvy with some improvement. Fioricet helps the more moderate ones.  ER visits: No  Positive Hx of: occipital nerve decompression surgery for bilateral  occipital nerves by a plastic surgeon (2014) which improved her constant headaches  Is medication overuse contributing to the patient's current migraine burden: No    She used to follow with Dr. Orozco (neurology) in [place] for migraines and cervical dystonia.  She is on daily scheduled extended-release cyclobenzaprine (generic). She has been having difficulty with Ubrelvy co pays, also the low dose is not effective and has to take more than one dose every time.     Headache Medication history:  AED Neuromodulators  MAOIs  Ergot Alkaloids    Acetazolamide (Diamox) [] Phenelzine (Nardil) [] Dihydroergotamine (Migranal) []   Carbamazepine (Tegretol) [] Tranylcypromine (Parnate) [] Ergotamine (Ergomar) []   Gabapentin (Neurontin) [] Antihistamine/Serotonergic  Triptans    Lacosamide (Vimpat) [] Cyproheptadine (Periactin) [] Almotriptan (Axert) []   Lamotrigine (Lamictal) [] Antihypertensives  Eletriptan (Relpax) []   Levatiracetam (Keppra) [] Atenolol (Tenormin) [] Frovatriptan (Frova) []   Oxcarbazepine (Trileptal) [] Bisoprostol (Zebeta) [] Naratriptan (Amerge) []   Levetiracetam (Keppra) [] Candesartan (Atacand) [] Rizatriptan (Maxalt) []   Pregabalin (Lyrica) [] Carvedilol (Coreg)  Sumatriptan (Imitrex) [x]   Topiramate (Topamax)  (Trokendi) [] Diltiazem (Cardizem) [] Zolmitriptan (Zomig) [x]   Valproic Acid (Depakote) (Divalproex Sodium) [] Lisinopril (Prinivil, Zestril) [] Combo Abortives    Zonisamide (Zonegran) [] Metoprolol (Toprol) [] BC Powder    Muscle relaxants  Nadolol (Corgard) [] Butalbital and Acetaminophen (Bupap) []   Methocarbamol (Robaxin) [] Nebivolol (Bystolic) [] Butalbital, Acetaminophen, and caffeine (Fioricet) [x]   Baclofen (Lioresal) [] Nicardipine (Cardene) []     Cyclobenzaprine (Amrix) [x] Nimodipine (Nimotop) [] Butalbital, Aspirin, and caffeine (Fiorinal) []   Tizanidine (Zanaflex) [] Propranolol (Inderal) [x]     Benzodiazepines  Telmisartan (Micardis) [] Butalbital, Caffeine,  Acetaminophen, and Codeine (Fioricet with Codeine) []   Clonazepam (Klonopin) [] Timolol (Blocadren) []     Alprazolam (Xanax) [] Verapamil (Calan, Verelan) [] Butalbital, Caffeine, Aspirin, and Codeine  (Fiorinal with Codeine) []   Diazepam (Valium) [] NSAIDs      Lorazepam (Ativan) [] Acetaminophen (Tylenol) [x]     Antidepressants   Acetylsalicylic Acid (Aspirin) [] Aspirin, Caffeine, and Acetaminophen (Excedrin) (Goodys) []   Amitriptyline (Elavil) [] Celecoxib (Celebrex) []     Bupropion (Wellbutrin) [] Diclofenac (Cambia) []     Citalopram (Celexa) [] Ibuprofen (Motrin, Advil) [] Acetaminophen, Caffeine, Pyrilamine maleate (Midol) []   Desipramine (Norpramin) [] Indomethacin (Indocin) []     Desvenlafazine (Pristiq) [] Ketoprofen (Orudis) [] Acetaminophen, Dichloralphenazone, and Isometheptene (Midrin) []   Doxepin (Sinequan) [] Ketorolac (Toradol) []     Duloxetine (Cymbalta) [] Naproxen (Anaprox, Aleve) []     Escitalopram (Lexapro) [] Meclofenamic Acid (Meclomen) [] Procedures    Fluoxetine (Prozac) [] Meloxicam (Mobic) [] Greater occipital nerve block []   Imipramine (Tofranil) [] Monoclonals  Cervical radiofrequency ablation []   Nortriptyline (Pamelor) [] Erenumab-aooe (Aimovig) [] Spenopalatine ganglion block []   Protriptyline (Vivactil) [] Galcanezumab (Emgality) [] Occipital neuro stimulation []   Trazodone (Desyrel, Oleptro) [] Fremanazumab-vfrm (Ajovy) [] Cervical Epidural steroid injection []   Venlafaxine (Effexor) [] Eptinezumab (Vyepti) [] Facet joint injections []   Oral CGRP inhibitors  Neuromodulation devices   Transforaminal epidural steroid injection []   Atogepant (Qulipta) [] Cefaly [] Cervical medial branch blocks []   Rimegepant (Nurtec) [] Gamma Core [] Botox (insurance issues, has been off x2 years) [x]   Ubrogepant (Ubrelvy) [x] Nerivio [] iovera []   Zavegepant (Zavzpret) [] Transcranial Magnetic stimulation [] Antiemetics    Other    Prochlorperazine (Compazine) []   Memantine  (Namenda) []   Metoclopramide (Reglan)  []       Promethazine (Phenergan)  []       Ondansetron (Zofran) []       Meclizine (Antivert, Dramamine) []     Review of Systems:  Review of Systems   HENT:  Negative for congestion, sinus pain and tinnitus.    Eyes:  Positive for photophobia. Negative for blurred vision and double vision.   Gastrointestinal:  Positive for nausea. Negative for vomiting.   Musculoskeletal:  Negative for falls and neck pain.   Neurological:  Positive for headaches. Negative for dizziness, sensory change and focal weakness.   Psychiatric/Behavioral:  The patient does not have insomnia.    All other systems reviewed and are negative.       Past Medical History:  -------------------------------------    Anemia    Anxiety    Arthritis    Arthritis    Breast disorder    Depression    Endometriosis    General anesthetics causing adverse effect in therapeutic use    Headaches, cluster    Hypertension    Mental disorder    Occipital neuralgia    PONV (postoperative nausea and vomiting)    Premature ovarian failure    Thyroid disease       Allergies:  Review of patient's allergies indicates:   Allergen Reactions    Pcn [penicillins] Swelling    Zoloft [sertraline] Hives and Swelling       Pertinent Family History:  Family History   Problem Relation Name Age of Onset    Autoimmune disease Maternal Uncle      Diabetes Maternal Grandmother none of your business     Miscarriages / Stillbirths Maternal Grandmother none of your business     Colon cancer Maternal Grandfather none of your business 70    Cancer Maternal Grandfather none of your business     Schizophrenia Father estranged     Suicide Father estranged     Depression Father estranged         Paranoid schizophrenic    Mental illness Father estranged         paranoid schizophrenia    Breast cancer Neg Hx      Eclampsia Neg Hx      Hypertension Neg Hx      Ovarian cancer Neg Hx         Pertinent Social History:  Social  History[1]    Medications:  Current Outpatient Medications   Medication Instructions    butalbital-acetaminophen-caff -40 mg Cap TK ONE C PO Q 4 HOURS    clonazePAM (KLONOPIN) 1 MG tablet No dose, route, or frequency recorded.    cyclobenzaprine (AMRIX) 15 mg, Oral, Daily PRN    diclofenac (VOLTAREN) 50 MG EC tablet TK 1 T PO BID    ergocalciferol (ERGOCALCIFEROL) 50,000 unit Cap TAKE ONE capsule (50,000 UNITS total) by MOUTH ONCE a WEEK    estradioL (ESTRACE) 1 mg, Oral, Daily    fenofibrate 160 mg, Oral, Daily    FeroSuL 325 mg    levomefolate/B12/herb 236 (RHEUMATE ORAL) 1 tablet, Daily    levothyroxine (SYNTHROID) 100 mcg, Oral, Daily    losartan (COZAAR) 100 mg, Oral, Daily    methotrexate 2.5 MG Tab Every 7 days    metronidazole 0.75% (METROCREAM) 0.75 % Crea Topical (Top), 2 times daily    NUVIGIL 250 mg tablet TK 1 T PO QD    omega-3 fatty acids/fish oil (FISH OIL-OMEGA-3 FATTY ACIDS) 300-1,000 mg capsule Daily    ondansetron (ZOFRAN-ODT) 4 mg, Oral, Every 6 hours PRN    pantoprazole (PROTONIX) 20 mg, Oral, Daily    pravastatin (PRAVACHOL) 40 mg, Oral, Daily    progesterone (PROMETRIUM) 100 mg, Oral, Daily    propranoloL (INDERAL) 20 mg, Oral, 2 times daily    scopolamine (TRANSDERM-SCOP) 1.3-1.5 mg (1 mg over 3 days) 1 patch, Transdermal, Every 72 hours    sodium chloride 0.9% SolP 100 mL with abatacept (with maltose) 250 mg SolR abatacept   750MG 1X MONTH    UBRELVY 50 mg, Oral, As needed (PRN), If symptoms persist or return, may repeat dose after 2 hours. Maximum: 200 mg per 24 hours        Objective:     *exam is limited due to the virtual nature of this visit    General:  Well-appearing, well-nourished, NAD, cooperative    Neurologic Exam:   Awake, alert and oriented x3  Speech spontaneous and fluent, intact comprehension.   Adequate fund of knowledge, vocabulary.  EOM intact. No ophthalmoplegia.   Facial expression is full and symmetric.   Hearing is intact.  Antigravity in BUE. No  tremor.      Pertinent lab results  Lab Results   Component Value Date    OVBFLUVV17 898 02/17/2025    WECRSVHJ86QT 17 (L) 02/17/2025     Lab Results   Component Value Date    TTGIGA 6 10/17/2022    TTGIGG 4 10/17/2022     Lab Results   Component Value Date    VGY20MFPP Negative 10/06/2020     Lab Results   Component Value Date    TSH 3.236 02/17/2025    FREET4 1.16 10/22/2024    WBC 8.35 10/22/2024    LYMPH 3.3 10/22/2024    LYMPH 39.0 10/22/2024    RBC 4.09 10/22/2024    HGB 12.8 10/22/2024    HCT 39.9 10/22/2024    MCV 98 10/22/2024     (H) 10/22/2024     10/22/2024    K 3.9 10/22/2024    CO2 22 (L) 10/22/2024    BUN 17 10/22/2024    CREATININE 0.8 10/22/2024    CALCIUM 9.3 10/22/2024    AST 28 10/22/2024    ALT 27 10/22/2024       Pertinent imaging results  *No relevant imaging available to review     Other pertinent studies  None    Assessment:   Esme Louise is a 54 y.o. female with RA, HTN, hyperlipidemia, hypothyroidism, LYSSA, cervical spondylosis, and migraines who presents for establishing care for headaches. Characteristics are most consistent with overlapping migraines without aura and cervicogenic headaches. She would benefit from a course of neck PT, should continue scheduled muscle relaxants. May consider anti-inflammatory in the future. For migraines rescue, she will need a higher dose of Ubrelvy. Lastly, previous imaging of the brain and neck was reportedly normal and headaches haven't changed inf frequency or nature, therefore repeating the images is not warranted at this time.    1. Cervicogenic headache    2. Cervical dystonia    3. Intractable migraine without aura and without status migrainosus      Plan:     - ubrogepant (UBRELVY) 100 mg tablet; Take 1 tablet (100 mg total) by mouth as needed for Migraine. If symptoms persist or return, may repeat dose after 2 hours. Maximum: 200 mg per 24 hours  Dispense: 10 tablet; Refill: 11  - Ambulatory referral/consult to  Physical/Occupational Therapy; Future  - butalbital-acetaminophen-caffeine -40 mg (FIORICET, ESGIC) -40 mg per tablet; Take 1 tablet by mouth every 6 (six) hours as needed for Headaches. No more than 2 days out of the week  Dispense: 15 tablet; Refill: 3  - Follow up: VV in 6-12 months       Disclaimer: This note was partly generated using dictation software which may occasionally result in transcription errors that are missed on review.      Based on our encounter today, my overall Medical Decision Making is a Level 4     Complexity of Problem: Moderate (2 or more stable chronic illnesses)  Complexity of Data: Moderate (Review of >3 unique test results)  Risk of Complications and/or morbidity/mortality of Management: Moderate risk (Prescription drug management)        Lidia Roper MD    [facility]  2025          [1]   Social History  Tobacco Use    Smoking status: Former     Current packs/day: 0.00     Average packs/day: 0.8 packs/day for 5.0 years (3.8 ttl pk-yrs)     Types: Cigarettes     Start date: 2001     Quit date: 2006     Years since quittin.1     Passive exposure: Past    Smokeless tobacco: Never    Tobacco comments:     smoke cloves in the past   Substance Use Topics    Alcohol use: Yes     Comment: worsen migraines  -rare    Drug use: No

## 2025-04-09 ENCOUNTER — PATIENT OUTREACH (OUTPATIENT)
Dept: ADMINISTRATIVE | Facility: HOSPITAL | Age: 55
End: 2025-04-09
Payer: COMMERCIAL

## 2025-04-14 RX ORDER — PROGESTERONE 100 MG/1
100 CAPSULE ORAL DAILY
Qty: 90 CAPSULE | Refills: 0 | Status: SHIPPED | OUTPATIENT
Start: 2025-04-14

## 2025-04-14 NOTE — TELEPHONE ENCOUNTER
Refill Routing Note   Medication(s) are not appropriate for processing by [facility] for the following reason(s):        Patient not seen by provider within 15 months    [facility] action(s):  Defer        Medication Therapy Plan: FOV in 3 weeks      Appointments  past 12m or future 3m with PCP    Date Provider   Last Visit   11/9/2023 Haylie Starr MD   Next Visit   5/8/2025 Haylie Starr MD   ED visits in past 90 days: 0        Note composed:2:25 PM 04/14/2025          
no

## 2025-05-08 ENCOUNTER — OFFICE VISIT (OUTPATIENT)
Dept: OBSTETRICS AND GYNECOLOGY | Facility: CLINIC | Age: 55
End: 2025-05-08
Attending: OBSTETRICS & GYNECOLOGY
Payer: COMMERCIAL

## 2025-05-08 VITALS
DIASTOLIC BLOOD PRESSURE: 72 MMHG | SYSTOLIC BLOOD PRESSURE: 118 MMHG | WEIGHT: 157.63 LBS | BODY MASS INDEX: 25.44 KG/M2

## 2025-05-08 DIAGNOSIS — Z11.51 ENCOUNTER FOR SCREENING FOR HUMAN PAPILLOMAVIRUS (HPV): ICD-10-CM

## 2025-05-08 DIAGNOSIS — E28.319 PREMATURE MENOPAUSE: ICD-10-CM

## 2025-05-08 DIAGNOSIS — Z12.31 BREAST CANCER SCREENING BY MAMMOGRAM: ICD-10-CM

## 2025-05-08 DIAGNOSIS — Z01.419 ENCOUNTER FOR GYNECOLOGICAL EXAMINATION (GENERAL) (ROUTINE) WITHOUT ABNORMAL FINDINGS: ICD-10-CM

## 2025-05-08 DIAGNOSIS — Z12.4 ENCOUNTER FOR PAPANICOLAOU SMEAR FOR CERVICAL CANCER SCREENING: Primary | ICD-10-CM

## 2025-05-08 PROCEDURE — 99999 PR PBB SHADOW E&M-EST. PATIENT-LVL IV: CPT | Mod: PBBFAC,,, | Performed by: OBSTETRICS & GYNECOLOGY

## 2025-05-08 RX ORDER — PROGESTERONE 100 MG/1
100 CAPSULE ORAL DAILY
Qty: 90 CAPSULE | Refills: 3 | Status: SHIPPED | OUTPATIENT
Start: 2025-05-08

## 2025-05-08 RX ORDER — ESTRADIOL 1 MG/1
1 TABLET ORAL DAILY
Qty: 90 TABLET | Refills: 3 | Status: SHIPPED | OUTPATIENT
Start: 2025-05-08 | End: 2026-05-08

## 2025-05-08 NOTE — PROGRESS NOTES
Subjective:       Patient ID: Esme Louise is a 55 y.o. female.    Chief Complaint:  Well Woman        History of Present Illness  Esme Louise is a 55 y.o. female  who presents for annual. Overall doing well. Happy on this HRT regimen. All questions answered. Known fibroid uterus, no complaints     Patient's last menstrual period was 2018 (approximate).   Date of Last Pap: 2020    Review of Systems  Review of Systems   Constitutional:  Negative for chills and fever.        Objective:   Physical Exam:   Constitutional: She is oriented to person, place, and time. Vital signs are normal. She appears well-developed and well-nourished. No distress.        Pulmonary/Chest: She exhibits no mass. Right breast exhibits no mass, no nipple discharge, no skin change, no tenderness, no bleeding and no swelling. Left breast exhibits no mass, no nipple discharge, no skin change, no tenderness, no bleeding and no swelling. Breasts are symmetrical.        Abdominal: Soft. Bowel sounds are normal. She exhibits no distension and no mass. There is no abdominal tenderness. There is no rebound.     Genitourinary:    Vagina normal.   There is no rash, tenderness, lesion or injury on the right labia. There is no rash, tenderness, lesion or injury on the left labia. Cervix is normal. Right adnexum displays no mass, no tenderness and no fullness. Left adnexum displays no mass, no tenderness and no fullness. No erythema, vaginal discharge, tenderness, rectocele, cystocele or prolapse of vaginal walls in the vagina. Cervix exhibits no motion tenderness, no discharge and no friability. Uterus is enlarged and hosting fibroids. Uterus is not deviated, not fixed and not tender. Uterus size: 12 cm.          Musculoskeletal: Normal range of motion and moves all extremeties.      Lymphadenopathy:        Right: No supraclavicular adenopathy present.        Left: No supraclavicular adenopathy present.    Neurological: She  is alert and oriented to person, place, and time.    Skin: Skin is warm and dry.    Psychiatric: She has a normal mood and affect. Her behavior is normal. Judgment normal.        Assessment/ Plan:     1. Encounter for Papanicolaou smear for cervical cancer screening  Liquid-Based Pap Smear, Screening      2. Premature menopause  progesterone (PROMETRIUM) 100 MG capsule    estradioL (ESTRACE) 1 MG tablet      3. Encounter for screening for human papillomavirus (HPV)  Liquid-Based Pap Smear, Screening      4. Breast cancer screening by mammogram  Mammo Digital Screening Bilat w/ Rodrick (XPD)      5. Encounter for gynecological examination (general) (routine) without abnormal findings            No follow-ups on file.    As of April 1, 2021, the Cures Act has been passed nationally. This new law requires that all doctors progress notes, lab results, pathology reports and radiology reports be released IMMEDIATELY to the patient in the patient portal. That means that the results are released to you at the EXACT same time they are released to me. Therefore, with all of the patients that I have I am not able to reply to each patient exactly when the results come in. So there will be a delay from when you see the results to when I see them and have time to come up with a response to send you. Also I only see these results when I am on the computer at work. So if the results come in over the weekend or after 5 pm of a work day, I will not see them until the next business day. As you can tell, this is a challenge as a physician to give every patient the quick response they hope for and deserve. So please be patient! Thanks for understanding, Dr. Starr

## 2025-06-02 ENCOUNTER — HOSPITAL ENCOUNTER (OUTPATIENT)
Dept: RADIOLOGY | Facility: HOSPITAL | Age: 55
Discharge: HOME | End: 2025-06-02
Attending: OBSTETRICS & GYNECOLOGY
Payer: COMMERCIAL

## 2025-06-02 DIAGNOSIS — Z12.31 BREAST CANCER SCREENING BY MAMMOGRAM: ICD-10-CM

## 2025-06-02 PROCEDURE — 77067 SCR MAMMO BI INCL CAD: CPT | Mod: 26,,, | Performed by: RADIOLOGY

## 2025-06-02 PROCEDURE — 77063 BREAST TOMOSYNTHESIS BI: CPT | Mod: 26,,, | Performed by: RADIOLOGY

## 2025-06-02 PROCEDURE — 77063 BREAST TOMOSYNTHESIS BI: CPT | Mod: TC

## 2025-06-05 ENCOUNTER — RESULTS FOLLOW-UP (OUTPATIENT)
Dept: OBSTETRICS AND GYNECOLOGY | Facility: CLINIC | Age: 55
End: 2025-06-05

## 2025-07-09 DIAGNOSIS — G43.019 INTRACTABLE MIGRAINE WITHOUT AURA AND WITHOUT STATUS MIGRAINOSUS: ICD-10-CM

## 2025-07-09 DIAGNOSIS — G44.86 CERVICOGENIC HEADACHE: ICD-10-CM

## 2025-07-09 RX ORDER — BUTALBITAL, ACETAMINOPHEN AND CAFFEINE 50; 325; 40 MG/1; MG/1; MG/1
1 TABLET ORAL EVERY 6 HOURS PRN
Qty: 15 TABLET | Refills: 3 | Status: SHIPPED | OUTPATIENT
Start: 2025-07-09

## (undated) DEVICE — GLOVE BIOGEL SKINSENSE PI 6.0

## (undated) DEVICE — TROCAR KII FIOS 11MM X 100MM

## (undated) DEVICE — COLLECTOR SPECIMEN ANAEROBIC

## (undated) DEVICE — KIT WING PAD POSITIONING

## (undated) DEVICE — SUT VICRYL 0 27 CT-2

## (undated) DEVICE — NDL INSUFFLATION VERRES 120MM

## (undated) DEVICE — SYS SEE SHARP SCOPE ANTIFOG

## (undated) DEVICE — TROCAR KII FIOS 5MM X 100MM

## (undated) DEVICE — GLOVE BIOGEL SKINSENSE PI 6.5

## (undated) DEVICE — APPLICATOR CHLORAPREP ORN 26ML

## (undated) DEVICE — IRRIGATOR ENDOSCOPY DISP.

## (undated) DEVICE — SEE L#120831

## (undated) DEVICE — SEE MEDLINE ITEM 156925

## (undated) DEVICE — SWAB CULTURETTE II DUAL

## (undated) DEVICE — SOL PVP-I SCRUB 7.5% 4OZ

## (undated) DEVICE — SYR B-D DISP CONTROL 10CC100/C

## (undated) DEVICE — GLOVE BIOGEL SKINSENSE PI 8.0

## (undated) DEVICE — NDL HYPO REG 25G X 1 1/2

## (undated) DEVICE — SUT MCRYL PLUS 4-0 PS2 27IN

## (undated) DEVICE — SET EXTENSION 30 IN W/LL ROLLE

## (undated) DEVICE — BAG TISSUE RETRIEVAL 225ML

## (undated) DEVICE — SOL NS 1000CC

## (undated) DEVICE — SUT EASE CROSSBOW CLSR SYS

## (undated) DEVICE — PENCIL ELECTROSURG HOLST W/BLD

## (undated) DEVICE — APPLIER CLIP EPIX UNIV 5X34

## (undated) DEVICE — ELECTRODE REM PLYHSV RETURN 9